# Patient Record
Sex: MALE | Race: BLACK OR AFRICAN AMERICAN | NOT HISPANIC OR LATINO | Employment: UNEMPLOYED | URBAN - METROPOLITAN AREA
[De-identification: names, ages, dates, MRNs, and addresses within clinical notes are randomized per-mention and may not be internally consistent; named-entity substitution may affect disease eponyms.]

---

## 2017-05-17 ENCOUNTER — HOSPITAL ENCOUNTER (EMERGENCY)
Facility: HOSPITAL | Age: 67
Discharge: HOME | End: 2017-05-17
Attending: FAMILY MEDICINE
Payer: MEDICAID

## 2017-05-17 VITALS
DIASTOLIC BLOOD PRESSURE: 88 MMHG | HEART RATE: 71 BPM | HEIGHT: 70 IN | RESPIRATION RATE: 16 BRPM | WEIGHT: 230 LBS | TEMPERATURE: 99 F | SYSTOLIC BLOOD PRESSURE: 169 MMHG | OXYGEN SATURATION: 98 % | BODY MASS INDEX: 32.93 KG/M2

## 2017-05-17 DIAGNOSIS — I10 ESSENTIAL HYPERTENSION: Primary | ICD-10-CM

## 2017-05-17 LAB
ALBUMIN SERPL BCP-MCNC: 4 G/DL
ALP SERPL-CCNC: 106 IU/L
ALT SERPL W/O P-5'-P-CCNC: 43 IU/L
ANION GAP SERPL CALC-SCNC: 9 MMOL/L
AST SERPL-CCNC: 36 IU/L
BASOPHILS # BLD AUTO: 0.01 K/UL
BASOPHILS NFR BLD: 0.2 %
BILIRUB SERPL-MCNC: 0.7 MG/DL
BUN SERPL-MCNC: 15 MG/DL
CALCIUM SERPL-MCNC: 9.4 MG/DL
CHLORIDE SERPL-SCNC: 105 MMOL/L
CO2 SERPL-SCNC: 28 MMOL/L
CREAT SERPL-MCNC: 1.11 MG/DL
DIFFERENTIAL METHOD: NORMAL
EOSINOPHIL # BLD AUTO: 0.1 K/UL
EOSINOPHIL NFR BLD: 1.3 %
ERYTHROCYTE [DISTWIDTH] IN BLOOD BY AUTOMATED COUNT: 12.4 %
EST. GFR  (AFRICAN AMERICAN): >60 ML/MIN/1.73 M^2
EST. GFR  (NON AFRICAN AMERICAN): >60 ML/MIN/1.73 M^2
GLUCOSE SERPL-MCNC: 116 MG/DL
HCT VFR BLD AUTO: 48.7 %
HGB BLD-MCNC: 16.2 G/DL
LYMPHOCYTES # BLD AUTO: 1.2 K/UL
LYMPHOCYTES NFR BLD: 21.9 %
MCH RBC QN AUTO: 30 PG
MCHC RBC AUTO-ENTMCNC: 33.3 %
MCV RBC AUTO: 90 FL
MONOCYTES # BLD AUTO: 0.3 K/UL
MONOCYTES NFR BLD: 5.9 %
NEUTROPHILS # BLD AUTO: 3.7 K/UL
NEUTROPHILS NFR BLD: 70.7 %
PLATELET # BLD AUTO: 251 K/UL
PMV BLD AUTO: 11.1 FL
POTASSIUM SERPL-SCNC: 4 MMOL/L
PROT SERPL-MCNC: 7.7 G/DL
RBC # BLD AUTO: 5.4 M/UL
SODIUM SERPL-SCNC: 142 MMOL/L
TROPONIN I SERPL DL<=0.01 NG/ML-MCNC: <0.012 NG/ML
WBC # BLD AUTO: 5.26 K/UL

## 2017-05-17 PROCEDURE — 80053 COMPREHEN METABOLIC PANEL: CPT

## 2017-05-17 PROCEDURE — 93005 ELECTROCARDIOGRAM TRACING: CPT

## 2017-05-17 PROCEDURE — 63600175 PHARM REV CODE 636 W HCPCS: Performed by: FAMILY MEDICINE

## 2017-05-17 PROCEDURE — 96376 TX/PRO/DX INJ SAME DRUG ADON: CPT

## 2017-05-17 PROCEDURE — 84484 ASSAY OF TROPONIN QUANT: CPT

## 2017-05-17 PROCEDURE — 93010 ELECTROCARDIOGRAM REPORT: CPT | Mod: ,,, | Performed by: INTERNAL MEDICINE

## 2017-05-17 PROCEDURE — 96375 TX/PRO/DX INJ NEW DRUG ADDON: CPT

## 2017-05-17 PROCEDURE — 96374 THER/PROPH/DIAG INJ IV PUSH: CPT

## 2017-05-17 PROCEDURE — 85025 COMPLETE CBC W/AUTO DIFF WBC: CPT

## 2017-05-17 PROCEDURE — 99284 EMERGENCY DEPT VISIT MOD MDM: CPT | Mod: 25

## 2017-05-17 PROCEDURE — 25000003 PHARM REV CODE 250: Performed by: FAMILY MEDICINE

## 2017-05-17 RX ORDER — HYDRALAZINE HYDROCHLORIDE 20 MG/ML
10 INJECTION INTRAMUSCULAR; INTRAVENOUS
Status: COMPLETED | OUTPATIENT
Start: 2017-05-17 | End: 2017-05-17

## 2017-05-17 RX ORDER — AMLODIPINE BESYLATE 10 MG/1
10 TABLET ORAL DAILY
Qty: 30 TABLET | Refills: 0 | Status: SHIPPED | OUTPATIENT
Start: 2017-05-17 | End: 2023-09-27

## 2017-05-17 RX ORDER — LISINOPRIL 40 MG/1
40 TABLET ORAL 2 TIMES DAILY
COMMUNITY
End: 2018-11-07 | Stop reason: ALTCHOICE

## 2017-05-17 RX ORDER — MORPHINE SULFATE 2 MG/ML
4 INJECTION, SOLUTION INTRAMUSCULAR; INTRAVENOUS
Status: COMPLETED | OUTPATIENT
Start: 2017-05-17 | End: 2017-05-17

## 2017-05-17 RX ORDER — METOPROLOL SUCCINATE 100 MG/1
100 TABLET, EXTENDED RELEASE ORAL 2 TIMES DAILY
COMMUNITY
End: 2018-11-07 | Stop reason: ALTCHOICE

## 2017-05-17 RX ORDER — CLONIDINE HYDROCHLORIDE 0.1 MG/1
0.1 TABLET ORAL
Status: COMPLETED | OUTPATIENT
Start: 2017-05-17 | End: 2017-05-17

## 2017-05-17 RX ORDER — CLONIDINE HYDROCHLORIDE 0.1 MG/1
0.1 TABLET ORAL DAILY
Qty: 30 TABLET | Refills: 0 | Status: SHIPPED | OUTPATIENT
Start: 2017-05-17 | End: 2018-11-07

## 2017-05-17 RX ORDER — ONDANSETRON 2 MG/ML
4 INJECTION INTRAMUSCULAR; INTRAVENOUS
Status: COMPLETED | OUTPATIENT
Start: 2017-05-17 | End: 2017-05-17

## 2017-05-17 RX ADMIN — HYDRALAZINE HYDROCHLORIDE 10 MG: 20 INJECTION INTRAMUSCULAR; INTRAVENOUS at 03:05

## 2017-05-17 RX ADMIN — HYDRALAZINE HYDROCHLORIDE 10 MG: 20 INJECTION INTRAMUSCULAR; INTRAVENOUS at 02:05

## 2017-05-17 RX ADMIN — ONDANSETRON 4 MG: 2 INJECTION INTRAMUSCULAR; INTRAVENOUS at 03:05

## 2017-05-17 RX ADMIN — CLONIDINE HYDROCHLORIDE 0.1 MG: 0.1 TABLET ORAL at 03:05

## 2017-05-17 RX ADMIN — MORPHINE SULFATE 4 MG: 2 INJECTION, SOLUTION INTRAMUSCULAR; INTRAVENOUS at 03:05

## 2017-05-17 NOTE — ED PROVIDER NOTES
Encounter Date: 5/17/2017       History     Chief Complaint   Patient presents with    Hypertension     I was at Urgent Care for a HA and they told me my pressure was high, 206/122.      Review of patient's allergies indicates:  No Known Allergies  HPI Comments: Patient complains of intermittent headache since last 4 days.  Patient says his been taking Tylenol which makes it better but this morning pain was more severe.  His pain is in the frontal area of the head.  No nausea, no vomiting, no blurring of vision, no neck stiffness.  Patient went to urgent care where his blood pressure was high and is sent to the ER for evaluation.  On arrival to the ER his blood pressure was still high and patient still continues to have pain.  No focal weakness.  Patient says he was supposed to be taking Caduet and that his insurance did not cover so he is not taking.  He did not go back to his doctor to change his medication.    The history is provided by the patient.     Past Medical History:   Diagnosis Date    Hypertension      Past Surgical History:   Procedure Laterality Date    APPENDECTOMY      PROSTATE SURGERY       History reviewed. No pertinent family history.  Social History   Substance Use Topics    Smoking status: Never Smoker    Smokeless tobacco: None    Alcohol use None     Review of Systems   Constitutional: Negative for activity change, appetite change, fatigue and fever.   HENT: Negative for congestion, ear discharge, ear pain, rhinorrhea and sore throat.    Eyes: Negative for pain, discharge, redness and itching.   Respiratory: Negative for cough, chest tightness, shortness of breath and wheezing.    Cardiovascular: Negative for chest pain and palpitations.   Gastrointestinal: Negative for abdominal distention, abdominal pain, diarrhea, nausea and vomiting.   Genitourinary: Negative for dysuria.   Musculoskeletal: Negative for back pain, gait problem and neck pain.   Skin: Negative for wound.    Neurological: Positive for headaches. Negative for dizziness, tremors, seizures, syncope, facial asymmetry, speech difficulty, weakness, light-headedness and numbness.   Psychiatric/Behavioral: Negative for confusion, decreased concentration and hallucinations. The patient is not nervous/anxious.    All other systems reviewed and are negative.      Physical Exam   Initial Vitals   BP Pulse Resp Temp SpO2   05/17/17 1328 05/17/17 1328 05/17/17 1328 05/17/17 1328 05/17/17 1328   210/107 70 15 99.1 °F (37.3 °C) 98 %     Physical Exam    Nursing note and vitals reviewed.  Constitutional: He appears well-developed and well-nourished.   HENT:   Head: Normocephalic and atraumatic.   Right Ear: External ear normal.   Left Ear: External ear normal.   Nose: Nose normal.   Mouth/Throat: Oropharynx is clear and moist.   Eyes: Conjunctivae and EOM are normal. Pupils are equal, round, and reactive to light.   Neck: Normal range of motion. Neck supple.   Cardiovascular: Normal rate, regular rhythm and normal heart sounds.   Pulmonary/Chest: Breath sounds normal. No respiratory distress. He has no wheezes. He has no rhonchi. He has no rales. He exhibits no tenderness.   Abdominal: Soft. Bowel sounds are normal. He exhibits no distension and no mass. There is no tenderness. There is no rebound and no guarding.   Musculoskeletal: Normal range of motion.   Neurological: He is alert and oriented to person, place, and time. He has normal strength and normal reflexes. He displays normal reflexes. No cranial nerve deficit or sensory deficit. GCS eye subscore is 4. GCS verbal subscore is 5. GCS motor subscore is 6.   Skin: Skin is warm. No rash noted. No erythema.         ED Course   Procedures  Labs Reviewed   CBC W/ AUTO DIFFERENTIAL   COMPREHENSIVE METABOLIC PANEL   APTT   TROPONIN I   PROTIME-INR     EKG Readings: (Independently Interpreted)   Rhythm: Normal Sinus Rhythm. Heart Rate: 75. Ectopy: No Ectopy. Conduction: Normal. ST  Segments: Normal ST Segments. T Waves: Normal. Q Waves: III. Clinical Impression: Normal Sinus Rhythm          Medical Decision Making:   ED Management:  Patient blood pressure and headache improved after medications..  Patient is restarted on his amlodipine and advised to follow-up with the primary care physician for reevaluation of his blood pressure.  Discharge in stable condition after his blood pressure and headache improved.  She is advised to follow-up ER if his blood pressure increases and headache worsens.                   ED Course     Clinical Impression:   The encounter diagnosis was Essential hypertension.    Disposition:   Disposition: Discharged  Condition: Fair  Advised to follow up primary care physician in 2 days.       Anthony Escobedo MD  05/17/17 5227

## 2017-05-17 NOTE — DISCHARGE INSTRUCTIONS
Taking Your Blood Pressure  Blood pressure is the force of blood against the artery wall as it moves from the heart through the blood vessels. You can take your own blood pressure reading using a digital monitor. Take readings as often as your healthcare provider instructs. Take each reading at the same time of day.  Step 1. Relax    · Take your blood pressure at the same time every day, such as in the morning or evening, or at the time your healthcare provider recommends.  · Wait at least a half-hour after smoking, eating, drinking caffeinated beverages, or exercising.  · Sit comfortably at a table with both feet on the floor. Do not cross your legs or feet. Place the monitor near you.  · Rest for a few minutes before you begin.  Step 2. Wrap the cuff    · Place your arm on the table, palm up. Your arm should be at the level of your heart. Wrap the cuff around your upper arm, just above your elbow. Its best done on bare skin, not over clothing. Most cuffs will indicate where the brachial artery (the blood vessel in the middle of the arm at the inner side of the elbow) should line up with the cuff. Look in your monitor's instruction booklet for an illustration. You can also bring your cuff to your healthcare provider and have them show you how to correctly place the cuff.  · Make sure your cuff fits. If it doesnt wrap around your upper arm, order a larger cuff.  Step 3. Inflate the cuff    · Pump the cuff until the scale reads 160. If you have a self-inflating cuff, push the button that starts the pump.  · The cuff will tighten, then loosen.  · The numbers will change. When they stop changing, your blood pressure reading will appear.  · Take 2 or 3 readings one minute apart.  Step 4. Write down the results of each reading    · Write down your blood pressure numbers for each reading. Note the date and time. Keep your results in one place, such as a notebook. Even if your monitor has a built-in memory, keep a hard  copy of the readings.  · Remove the cuff from your arm. Turn off the machine.  · Share your blood pressure records with your healthcare providers at each visit.  Date Last Reviewed: 4/27/2016  © 6886-5045 [employer]. [address] [place] [address], [place] [address]. All rights reserved. This information is not intended as a substitute for professional medical care. Always follow your healthcare professional's instructions.

## 2018-04-10 ENCOUNTER — OFFICE VISIT (OUTPATIENT)
Dept: GASTROENTEROLOGY | Facility: CLINIC | Age: 68
End: 2018-04-10
Payer: MEDICARE

## 2018-04-10 VITALS
BODY MASS INDEX: 34.79 KG/M2 | HEIGHT: 70 IN | WEIGHT: 243 LBS | HEART RATE: 67 BPM | SYSTOLIC BLOOD PRESSURE: 181 MMHG | DIASTOLIC BLOOD PRESSURE: 90 MMHG

## 2018-04-10 DIAGNOSIS — Z80.0 FAMILY HISTORY OF COLON CANCER: Primary | ICD-10-CM

## 2018-04-10 DIAGNOSIS — Z86.010 H/O ADENOMATOUS POLYP OF COLON: ICD-10-CM

## 2018-04-10 PROCEDURE — 99203 OFFICE O/P NEW LOW 30 MIN: CPT | Mod: S$GLB,,, | Performed by: INTERNAL MEDICINE

## 2018-04-10 RX ORDER — ATORVASTATIN CALCIUM 40 MG/1
40 TABLET, FILM COATED ORAL DAILY
Status: ON HOLD | COMMUNITY
End: 2023-09-15 | Stop reason: HOSPADM

## 2018-04-10 NOTE — PATIENT INSTRUCTIONS
Colonoscopy     A camera attached to a flexible tube with a viewing lens is used to take video pictures.     Colonoscopy is a test to view the inside of your lower digestive tract (colon and rectum). Sometimes it can show the last part of the small intestine (ileum). During the test, small pieces of tissue may be removed for testing. This is called a biopsy. Small growths, such as polyps, may also be removed.   Why is colonoscopy done?  The test is done to help look for colon cancer. And it can help find the source of abdominal pain, bleeding, and changes in bowel habits. It may be needed once a year, depending on factors such as your:  · Age  · Health history  · Family health history  · Symptoms  · Results from any prior colonoscopy  Risks and possible complications  These include:  · Bleeding               · A puncture or tear in the colon   · Risks of anesthesia  · A cancer lesion not being seen  Getting ready   To prepare for the test:  · Talk with your healthcare provider about the risks of the test (see below). Also ask your healthcare provider about alternatives to the test.  · Tell your healthcare provider about any medicines you take. Also tell him or her about any health conditions you may have.  · Make sure your rectum and colon are empty for the test. Follow the diet and bowel prep instructions exactly. If you dont, the test may need to be rescheduled.  · Plan for a friend or family member to drive you home after the test.     Colonoscopy provides an inside view of the entire colon.     You may discuss the results with your doctor right away or at a future visit.  During the test   The test is usually done in the hospital on an outpatient basis. This means you go home the same day. The procedure takes about 30 minutes. During that time:  · You are given relaxing (sedating) medicine through an IV line. You may be drowsy, or fully asleep.  · The healthcare provider will first give you a physical exam to  check for anal and rectal problems.  · Then the anus is lubricated and the scope inserted.  · If you are awake, you may have a feeling similar to needing to have a bowel movement. You may also feel pressure as air is pumped into the colon. Its OK to pass gas during the procedure.  · Biopsy, polyp removal, or other treatments may be done during the test.  After the test   You may have gas right after the test. It can help to try to pass it to help prevent later bloating. Your healthcare provider may discuss the results with you right away. Or you may need to schedule a follow-up visit to talk about the results. After the test, you can go back to your normal eating and other activities. You may be tired from the sedation and need to rest for a few hours.  Date Last Reviewed: 11/1/2016 © 2000-2017 [employer]. [address] [place] [address], [place] [address]. All rights reserved. This information is not intended as a substitute for professional medical care. Always follow your healthcare professional's instructions.

## 2018-04-10 NOTE — PROGRESS NOTES
History and Physical      Chief complaints: Requesting screening colonscopy    History of Presenting Illness     Patient is a family history of colon cancer in his sister.  Colonoscopy May 31, 2013 documented a small adenoma which was removed.  Patient is due for follow-up colonoscopy.   Patient denies any abdominal pain, weight loss or blood in the stool.    GI systems review is negative.  Past medical history includes hypertension.  Assisting systems review is generally negative.  Patient does have some joint pain.  Prior history of kidney stones and hypertension.  Nonsmoker.  Nondrinker.  Prior appendectomy       Past Medical History:   Diagnosis Date    Hypertension        Past Surgical History:   Procedure Laterality Date    APPENDECTOMY      PROSTATE SURGERY         Family History   Problem Relation Age of Onset    No Known Problems Mother     No Known Problems Father     No Known Problems Sister        Social History     Social History    Marital status:      Spouse name: N/A    Number of children: N/A    Years of education: N/A     Social History Main Topics    Smoking status: Never Smoker    Smokeless tobacco: None    Alcohol use None    Drug use: No    Sexual activity: Not Asked     Other Topics Concern    None     Social History Narrative    None       Current Outpatient Prescriptions   Medication Sig Dispense Refill    amlodipine (NORVASC) 10 MG tablet Take 1 tablet (10 mg total) by mouth once daily. 30 tablet 0    atorvastatin (LIPITOR) 40 MG tablet Take 40 mg by mouth once daily.      cloNIDine (CATAPRES) 0.1 MG tablet Take 1 tablet (0.1 mg total) by mouth once daily. 30 tablet 0    lisinopril (PRINIVIL,ZESTRIL) 40 MG tablet Take 40 mg by mouth 2 (two) times daily.      metoprolol succinate (TOPROL-XL) 100 MG 24 hr tablet Take 100 mg by mouth once daily.       No current facility-administered medications for this visit.        Review of patient's allergies indicates:  No  Known Allergies    Objective:      Vitals:    04/10/18 1005   BP: (!) 181/90   Pulse: 67     Physical Exam   Constitutional: Patient is oriented to person, place, and time. Appears well-nourished.   HENT: sclera non icteric  Mouth/Throat: Oropharynx is clear and moist.   Eyes: Pupils are equal, round, and reactive to light.   Neck: Neck supple.   Cardiovascular: Normal heart sounds.   Pulmonary/Chest: Effort normal and breath sounds normal.   Abdominal: Soft. Exhibits no mass. There is no tenderness. There is no guarding.    Neurological:Alert and oriented to person, place, and time.   Skin: Skin is warm. No rash noted.   Psychiatric: Has a normal mood and affect.     Assessment:  Colon cancer surveillance  Family history colon cancer  Personal history of small adenoma    Plan:  Colonoscopy       I have reviewed the patient's medical history in detail and updated the computerized patient record

## 2018-04-10 NOTE — LETTER
April 10, 2018      Karen Aragon MD  [address]  [address]  [facility]  [place] [address]           [facility]  [address], [address]   [place] [address]  Phone: [phone]  Fax: [phone]          Patient: Luis Mendez   MR Number: [id number]   YOB: 1950   Date of Visit: 4/10/2018       Dear Dr. Karen Aragon:    Thank you for referring Luis Mendez to me for evaluation. Attached you will find relevant portions of my assessment and plan of care.    If you have questions, please do not hesitate to call me. I look forward to following Luis Mendez along with you.    Sincerely,    Edison Amado Jr., MD    Enclosure  CC:  No Recipients    If you would like to receive this communication electronically, please contact [email or web address] or [phone] to request more information on [employer] [facility] access.    For providers and/or their staff who would like to refer a patient to [facility], please contact us through our one-stop-shop provider referral line, [facility], at [phone].    If you feel you have received this communication in error or would no longer like to receive these types of communications, please e-mail [email or web address]

## 2018-04-11 ENCOUNTER — TELEPHONE (OUTPATIENT)
Dept: GASTROENTEROLOGY | Facility: CLINIC | Age: 68
End: 2018-04-11

## 2018-04-11 DIAGNOSIS — Z86.010 HISTORY OF COLONIC POLYPS: Primary | ICD-10-CM

## 2018-04-11 NOTE — TELEPHONE ENCOUNTER
Patient is scheduled for Colonoscopy at [facility] on 5/15/18, prep instructions was explained and mailed out. Patient request for Miralax & Dulcolax instructions.

## 2018-05-15 PROBLEM — Z12.12 SCREENING FOR COLORECTAL CANCER: Status: ACTIVE | Noted: 2018-05-15

## 2018-05-15 PROBLEM — Z12.11 SCREENING FOR COLORECTAL CANCER: Status: ACTIVE | Noted: 2018-05-15

## 2018-05-29 ENCOUNTER — TELEPHONE (OUTPATIENT)
Dept: GASTROENTEROLOGY | Facility: CLINIC | Age: 68
End: 2018-05-29

## 2018-05-29 NOTE — TELEPHONE ENCOUNTER
----- Message from Monika Cutler sent at 5/29/2018  1:37 PM CDT -----  Contact: Self. [phone] ([place] office)  Patient is returning your call.  Please advise.

## 2018-05-29 NOTE — TELEPHONE ENCOUNTER
----- Message from Edison Amado Jr., MD sent at 5/28/2018  4:42 PM CDT -----  Polyp is an adenoma.  Follow-up colonoscopy in 4 years.

## 2018-05-29 NOTE — TELEPHONE ENCOUNTER
A message was left on patient's voice mail to give the office a call back in regards to test results. A recall was made for follow-up in 4 years.

## 2018-08-04 ENCOUNTER — HOSPITAL ENCOUNTER (EMERGENCY)
Facility: HOSPITAL | Age: 68
Discharge: HOME | End: 2018-08-04
Attending: SURGERY
Payer: MEDICARE

## 2018-08-04 VITALS
OXYGEN SATURATION: 99 % | RESPIRATION RATE: 16 BRPM | TEMPERATURE: 99 F | BODY MASS INDEX: 34.44 KG/M2 | SYSTOLIC BLOOD PRESSURE: 141 MMHG | DIASTOLIC BLOOD PRESSURE: 77 MMHG | HEART RATE: 81 BPM | WEIGHT: 240 LBS

## 2018-08-04 DIAGNOSIS — R42 VERTIGO: Primary | ICD-10-CM

## 2018-08-04 DIAGNOSIS — R06.02 SOB (SHORTNESS OF BREATH): ICD-10-CM

## 2018-08-04 DIAGNOSIS — I10 HYPERTENSION: ICD-10-CM

## 2018-08-04 LAB
ALBUMIN SERPL BCP-MCNC: 4.3 G/DL
ALP SERPL-CCNC: 93 U/L
ALT SERPL W/O P-5'-P-CCNC: 37 U/L
ANION GAP SERPL CALC-SCNC: 6 MMOL/L
AST SERPL-CCNC: 28 U/L
BASOPHILS # BLD AUTO: 0.01 K/UL
BASOPHILS NFR BLD: 0.2 %
BILIRUB SERPL-MCNC: 0.7 MG/DL
BUN SERPL-MCNC: 17 MG/DL
CALCIUM SERPL-MCNC: 9.2 MG/DL
CHLORIDE SERPL-SCNC: 104 MMOL/L
CO2 SERPL-SCNC: 31 MMOL/L
CREAT SERPL-MCNC: 1.1 MG/DL
DIFFERENTIAL METHOD: NORMAL
EOSINOPHIL # BLD AUTO: 0.1 K/UL
EOSINOPHIL NFR BLD: 2.4 %
ERYTHROCYTE [DISTWIDTH] IN BLOOD BY AUTOMATED COUNT: 12 %
EST. GFR  (AFRICAN AMERICAN): >60 ML/MIN/1.73 M^2
EST. GFR  (NON AFRICAN AMERICAN): >60 ML/MIN/1.73 M^2
GLUCOSE SERPL-MCNC: 109 MG/DL
HCT VFR BLD AUTO: 46.6 %
HGB BLD-MCNC: 15.5 G/DL
INR PPP: 1.1
LYMPHOCYTES # BLD AUTO: 1.5 K/UL
LYMPHOCYTES NFR BLD: 29.6 %
MCH RBC QN AUTO: 29.8 PG
MCHC RBC AUTO-ENTMCNC: 33.3 G/DL
MCV RBC AUTO: 89 FL
MONOCYTES # BLD AUTO: 0.5 K/UL
MONOCYTES NFR BLD: 10.5 %
NEUTROPHILS # BLD AUTO: 2.8 K/UL
NEUTROPHILS NFR BLD: 57.1 %
NT-PROBNP: 42 PG/ML
PLATELET # BLD AUTO: 258 K/UL
PMV BLD AUTO: 10.6 FL
POTASSIUM SERPL-SCNC: 4.1 MMOL/L
PROT SERPL-MCNC: 8.4 G/DL
PROTHROMBIN TIME: 11.6 SEC
RBC # BLD AUTO: 5.21 M/UL
SODIUM SERPL-SCNC: 141 MMOL/L
TROPONIN I SERPL DL<=0.01 NG/ML-MCNC: <0.012 NG/ML
WBC # BLD AUTO: 4.96 K/UL

## 2018-08-04 PROCEDURE — 93010 ELECTROCARDIOGRAM REPORT: CPT | Mod: ,,, | Performed by: INTERNAL MEDICINE

## 2018-08-04 PROCEDURE — 85610 PROTHROMBIN TIME: CPT

## 2018-08-04 PROCEDURE — 93005 ELECTROCARDIOGRAM TRACING: CPT

## 2018-08-04 PROCEDURE — 99284 EMERGENCY DEPT VISIT MOD MDM: CPT | Mod: 25

## 2018-08-04 PROCEDURE — 63600175 PHARM REV CODE 636 W HCPCS: Performed by: SURGERY

## 2018-08-04 PROCEDURE — 84484 ASSAY OF TROPONIN QUANT: CPT

## 2018-08-04 PROCEDURE — 96361 HYDRATE IV INFUSION ADD-ON: CPT

## 2018-08-04 PROCEDURE — 25000003 PHARM REV CODE 250: Performed by: SURGERY

## 2018-08-04 PROCEDURE — 96374 THER/PROPH/DIAG INJ IV PUSH: CPT

## 2018-08-04 PROCEDURE — 83880 ASSAY OF NATRIURETIC PEPTIDE: CPT

## 2018-08-04 PROCEDURE — 80053 COMPREHEN METABOLIC PANEL: CPT

## 2018-08-04 PROCEDURE — 85025 COMPLETE CBC W/AUTO DIFF WBC: CPT

## 2018-08-04 PROCEDURE — 96375 TX/PRO/DX INJ NEW DRUG ADDON: CPT

## 2018-08-04 RX ORDER — MECLIZINE HYDROCHLORIDE 25 MG/1
25 TABLET ORAL 3 TIMES DAILY PRN
Qty: 20 TABLET | Refills: 0 | Status: SHIPPED | OUTPATIENT
Start: 2018-08-04 | End: 2018-11-07

## 2018-08-04 RX ORDER — MECLIZINE HCL 12.5 MG 12.5 MG/1
25 TABLET ORAL
Status: COMPLETED | OUTPATIENT
Start: 2018-08-04 | End: 2018-08-04

## 2018-08-04 RX ORDER — SODIUM CHLORIDE 9 MG/ML
1000 INJECTION, SOLUTION INTRAVENOUS
Status: COMPLETED | OUTPATIENT
Start: 2018-08-04 | End: 2018-08-04

## 2018-08-04 RX ORDER — ASPIRIN 325 MG
325 TABLET ORAL DAILY
COMMUNITY
End: 2018-11-07 | Stop reason: DRUGHIGH

## 2018-08-04 RX ORDER — HYDRALAZINE HYDROCHLORIDE 20 MG/ML
20 INJECTION INTRAMUSCULAR; INTRAVENOUS
Status: COMPLETED | OUTPATIENT
Start: 2018-08-04 | End: 2018-08-04

## 2018-08-04 RX ADMIN — LORAZEPAM 0.5 MG: 2 INJECTION INTRAMUSCULAR; INTRAVENOUS at 11:08

## 2018-08-04 RX ADMIN — MECLIZINE 25 MG: 12.5 TABLET ORAL at 10:08

## 2018-08-04 RX ADMIN — SODIUM CHLORIDE 1000 ML: 0.9 INJECTION, SOLUTION INTRAVENOUS at 11:08

## 2018-08-04 RX ADMIN — HYDRALAZINE HYDROCHLORIDE 20 MG: 20 INJECTION INTRAMUSCULAR; INTRAVENOUS at 11:08

## 2018-08-04 NOTE — ED PROVIDER NOTES
Encounter Date: 8/4/2018       History     Chief Complaint   Patient presents with    Dizziness     states got dizzy that started today with mild SOB     Patient stated he got dizzy when he got up this morning and change positions and got low short winded.  He does not complain of any chest pain headache  or abdominal pain. His blood pressure on admit was 220/110      The history is provided by the patient.   Dizziness   This is a new problem. The current episode started 3 to 5 hours ago. The problem occurs hourly. The problem has been gradually improving. Associated symptoms include shortness of breath. Pertinent negatives include no chest pain, no abdominal pain and no headaches. The symptoms are aggravated by standing and exertion. Nothing relieves the symptoms. He has tried nothing for the symptoms. The treatment provided mild relief.     Review of patient's allergies indicates:  No Known Allergies  Past Medical History:   Diagnosis Date    Hypertension      Past Surgical History:   Procedure Laterality Date    APPENDECTOMY      COLONOSCOPY N/A 5/15/2018    Procedure: COLONOSCOPY;  Surgeon: Edison Amado Jr., MD;  [facility];  Service: Endoscopy;  Laterality: N/A;    PROSTATE SURGERY       Family History   Problem Relation Age of Onset    No Known Problems Mother     No Known Problems Father     No Known Problems Sister      Social History   Substance Use Topics    Smoking status: Former Smoker    Smokeless tobacco: Never Used    Alcohol use No     Review of Systems   Constitutional: Negative.    HENT: Negative.    Eyes: Negative.    Respiratory: Positive for shortness of breath.    Cardiovascular: Negative for chest pain.   Gastrointestinal: Negative for abdominal pain.   Endocrine: Negative.    Genitourinary: Negative.    Musculoskeletal: Negative.    Skin: Negative.    Allergic/Immunologic: Negative.    Neurological: Positive for dizziness. Negative for headaches.   Hematological:  Negative.    Psychiatric/Behavioral: Negative.        Physical Exam     Initial Vitals [08/04/18 1033]   BP Pulse Resp Temp SpO2   (!) 222/104 73 20 98.7 °F (37.1 °C) 100 %      MAP       --         Physical Exam    Nursing note and vitals reviewed.  Constitutional: He appears well-developed and well-nourished.   HENT:   Head: Normocephalic.   Eyes: Conjunctivae are normal.   Neck: Normal range of motion.   Cardiovascular: Normal rate, regular rhythm, normal heart sounds and intact distal pulses.   Abdominal: Soft.   Musculoskeletal: Normal range of motion.   Neurological: He is alert and oriented to person, place, and time. He has normal strength. No cranial nerve deficit or sensory deficit.   Skin: Skin is warm and dry.   Psychiatric: He has a normal mood and affect.         ED Course   Procedures  Labs Reviewed   COMPREHENSIVE METABOLIC PANEL - Abnormal; Notable for the following:        Result Value    CO2 31 (*)     Anion Gap 6 (*)     All other components within normal limits   CBC W/ AUTO DIFFERENTIAL   PROTIME-INR   TROPONIN I   NT-PRO NATRIURETIC PEPTIDE   URINALYSIS     EKG Readings: (Independently Interpreted)   Rhythm: Normal Sinus Rhythm. Ectopy: No Ectopy. Conduction: Normal. ST Segments: Normal ST Segments. T Waves: Normal. Clinical Impression: Normal Sinus Rhythm       Imaging Results          X-Ray Chest 1 View (Final result)  Result time 08/04/18 11:55:24    Final result by Ishaan Walton MD (08/04/18 11:55:24)                 Impression:      No acute abnormality.      Electronically signed by: Horacio Walton MD  Date:    08/04/2018  Time:    11:55             Narrative:    EXAMINATION:  XR CHEST 1 VIEW    CLINICAL HISTORY:  . Shortness of breath    TECHNIQUE:  Single frontal portable view of the chest was performed.    COMPARISON:  None    FINDINGS:  Support devices: None    The lungs are clear, with normal appearance of pulmonary vasculature and no pleural effusion or pneumothorax.    The  cardiac silhouette is normal in size. The hilar and mediastinal contours are unremarkable.    Bones are intact.                                 Medical Decision Making:   Initial Assessment:   Hypertension and dizziness  ED Management:  Patient had normal neurologic exam.  His blood pressure was lowered in the ED.  His EKG troponins x-rays were all normal He was asymptomatic at time of discharge                      Clinical Impression:   The primary encounter diagnosis was Vertigo. Diagnoses of Hypertension and SOB (shortness of breath) were also pertinent to this visit.      Disposition:   Disposition: Discharged  Condition: Stable                        CDawson Valdez III, MD  08/04/18 8959

## 2018-08-04 NOTE — ED NOTES
Warm blanket provided to pt for comfort measures, informed pt again that the wait is on the urine specimen

## 2018-08-06 ENCOUNTER — LAB VISIT (OUTPATIENT)
Dept: LAB | Facility: HOSPITAL | Age: 68
End: 2018-08-06
Attending: INTERNAL MEDICINE
Payer: MEDICARE

## 2018-08-06 DIAGNOSIS — R07.2 PRECORDIAL PAIN: Primary | ICD-10-CM

## 2018-08-06 DIAGNOSIS — E07.9 DISORDER OF THYROID, UNSPECIFIED: ICD-10-CM

## 2018-08-06 DIAGNOSIS — R07.9 CHEST PAIN, UNSPECIFIED: Primary | ICD-10-CM

## 2018-08-06 LAB
ALBUMIN SERPL BCP-MCNC: 4.4 G/DL
ALP SERPL-CCNC: 97 U/L
ALT SERPL W/O P-5'-P-CCNC: 38 U/L
ANION GAP SERPL CALC-SCNC: 7 MMOL/L
AST SERPL-CCNC: 28 U/L
BASOPHILS # BLD AUTO: 0.02 K/UL
BASOPHILS NFR BLD: 0.4 %
BILIRUB SERPL-MCNC: 0.8 MG/DL
BUN SERPL-MCNC: 20 MG/DL
CALCIUM SERPL-MCNC: 9.4 MG/DL
CHLORIDE SERPL-SCNC: 105 MMOL/L
CHOLEST SERPL-MCNC: 146 MG/DL
CHOLEST/HDLC SERPL: 3.2 {RATIO}
CO2 SERPL-SCNC: 30 MMOL/L
CREAT SERPL-MCNC: 1.46 MG/DL
DIFFERENTIAL METHOD: NORMAL
EOSINOPHIL # BLD AUTO: 0.1 K/UL
EOSINOPHIL NFR BLD: 1.7 %
ERYTHROCYTE [DISTWIDTH] IN BLOOD BY AUTOMATED COUNT: 12.7 %
EST. GFR  (AFRICAN AMERICAN): 57.5 ML/MIN/1.73 M^2
EST. GFR  (NON AFRICAN AMERICAN): 49.8 ML/MIN/1.73 M^2
GLUCOSE SERPL-MCNC: 116 MG/DL
HCT VFR BLD AUTO: 46.4 %
HDLC SERPL-MCNC: 46 MG/DL
HDLC SERPL: 31.5 %
HGB BLD-MCNC: 15.5 G/DL
LDLC SERPL CALC-MCNC: 87.4 MG/DL
LYMPHOCYTES # BLD AUTO: 1.7 K/UL
LYMPHOCYTES NFR BLD: 34.9 %
MCH RBC QN AUTO: 30.3 PG
MCHC RBC AUTO-ENTMCNC: 33.4 G/DL
MCV RBC AUTO: 91 FL
MONOCYTES # BLD AUTO: 0.5 K/UL
MONOCYTES NFR BLD: 10.3 %
NEUTROPHILS # BLD AUTO: 2.5 K/UL
NEUTROPHILS NFR BLD: 52.7 %
NONHDLC SERPL-MCNC: 100 MG/DL
PLATELET # BLD AUTO: 269 K/UL
PMV BLD AUTO: 10.6 FL
POTASSIUM SERPL-SCNC: 4.1 MMOL/L
PROT SERPL-MCNC: 8.3 G/DL
RBC # BLD AUTO: 5.12 M/UL
SODIUM SERPL-SCNC: 142 MMOL/L
TRIGL SERPL-MCNC: 63 MG/DL
TSH SERPL DL<=0.005 MIU/L-ACNC: 1.56 UIU/ML
WBC # BLD AUTO: 4.75 K/UL

## 2018-08-06 PROCEDURE — 85025 COMPLETE CBC W/AUTO DIFF WBC: CPT | Mod: PO

## 2018-08-06 PROCEDURE — 84443 ASSAY THYROID STIM HORMONE: CPT | Mod: PO

## 2018-08-06 PROCEDURE — 86803 HEPATITIS C AB TEST: CPT | Mod: PO

## 2018-08-06 PROCEDURE — 80061 LIPID PANEL: CPT

## 2018-08-06 PROCEDURE — 36415 COLL VENOUS BLD VENIPUNCTURE: CPT | Mod: PO

## 2018-08-06 PROCEDURE — 80053 COMPREHEN METABOLIC PANEL: CPT | Mod: PO

## 2018-08-07 LAB — HCV AB SERPL QL IA: NEGATIVE

## 2018-08-09 DIAGNOSIS — R07.9 CHEST PAIN: ICD-10-CM

## 2018-08-09 DIAGNOSIS — I10 HTN (HYPERTENSION): Primary | ICD-10-CM

## 2018-08-09 DIAGNOSIS — I10 HYPERTENSION: Primary | ICD-10-CM

## 2018-08-10 ENCOUNTER — HOSPITAL ENCOUNTER (OUTPATIENT)
Dept: CARDIOLOGY | Facility: HOSPITAL | Age: 68
Discharge: HOME | End: 2018-08-10
Attending: INTERNAL MEDICINE
Payer: MEDICARE

## 2018-08-10 ENCOUNTER — HOSPITAL ENCOUNTER (OUTPATIENT)
Dept: RADIOLOGY | Facility: HOSPITAL | Age: 68
Discharge: HOME | End: 2018-08-10
Attending: INTERNAL MEDICINE
Payer: MEDICARE

## 2018-08-10 DIAGNOSIS — I10 HYPERTENSION: ICD-10-CM

## 2018-08-10 DIAGNOSIS — R07.9 CHEST PAIN: ICD-10-CM

## 2018-08-10 DIAGNOSIS — I10 HTN (HYPERTENSION): ICD-10-CM

## 2018-08-10 DIAGNOSIS — I10 HYPERTENSION: Primary | ICD-10-CM

## 2018-08-10 DIAGNOSIS — R07.9 CHEST PAIN, UNSPECIFIED: ICD-10-CM

## 2018-08-10 DIAGNOSIS — I10 HTN (HYPERTENSION): Primary | ICD-10-CM

## 2018-08-10 LAB
DIASTOLIC DYSFUNCTION: NO
DIASTOLIC DYSFUNCTION: NO
MITRAL VALVE MOBILITY: NORMAL
RETIRED EF AND QEF - SEE NOTES: 55 (ref 55–65)
TRICUSPID VALVE REGURGITATION: NORMAL

## 2018-08-10 PROCEDURE — 93016 CV STRESS TEST SUPVJ ONLY: CPT | Mod: ,,, | Performed by: INTERNAL MEDICINE

## 2018-08-10 PROCEDURE — 93306 TTE W/DOPPLER COMPLETE: CPT | Mod: 26,,, | Performed by: INTERNAL MEDICINE

## 2018-08-10 PROCEDURE — 93306 TTE W/DOPPLER COMPLETE: CPT | Mod: PO

## 2018-08-10 PROCEDURE — 93018 CV STRESS TEST I&R ONLY: CPT | Mod: ,,, | Performed by: INTERNAL MEDICINE

## 2018-08-10 PROCEDURE — 78452 HT MUSCLE IMAGE SPECT MULT: CPT | Mod: TC,PO

## 2018-08-10 RX ORDER — REGADENOSON 0.08 MG/ML
INJECTION, SOLUTION INTRAVENOUS
Status: DISPENSED
Start: 2018-08-10 | End: 2018-08-10

## 2018-08-12 ENCOUNTER — HOSPITAL ENCOUNTER (EMERGENCY)
Facility: HOSPITAL | Age: 68
Discharge: HOME | End: 2018-08-12
Attending: EMERGENCY MEDICINE
Payer: MEDICARE

## 2018-08-12 VITALS
BODY MASS INDEX: 33.14 KG/M2 | WEIGHT: 231.5 LBS | RESPIRATION RATE: 18 BRPM | TEMPERATURE: 98 F | OXYGEN SATURATION: 98 % | HEIGHT: 70 IN | SYSTOLIC BLOOD PRESSURE: 187 MMHG | DIASTOLIC BLOOD PRESSURE: 84 MMHG | HEART RATE: 79 BPM

## 2018-08-12 DIAGNOSIS — R07.9 CHEST PAIN: ICD-10-CM

## 2018-08-12 DIAGNOSIS — I10 ESSENTIAL HYPERTENSION: Primary | ICD-10-CM

## 2018-08-12 LAB
ALBUMIN SERPL BCP-MCNC: 3.9 G/DL
ALP SERPL-CCNC: 95 U/L
ALT SERPL W/O P-5'-P-CCNC: 32 U/L
ANION GAP SERPL CALC-SCNC: 10 MMOL/L
AST SERPL-CCNC: 20 U/L
BASOPHILS # BLD AUTO: 0.02 K/UL
BASOPHILS NFR BLD: 0.5 %
BILIRUB SERPL-MCNC: 0.9 MG/DL
BNP SERPL-MCNC: <10 PG/ML
BUN SERPL-MCNC: 14 MG/DL
CALCIUM SERPL-MCNC: 9.6 MG/DL
CHLORIDE SERPL-SCNC: 105 MMOL/L
CO2 SERPL-SCNC: 25 MMOL/L
CREAT SERPL-MCNC: 1.3 MG/DL
DIFFERENTIAL METHOD: ABNORMAL
EOSINOPHIL # BLD AUTO: 0.1 K/UL
EOSINOPHIL NFR BLD: 3.5 %
ERYTHROCYTE [DISTWIDTH] IN BLOOD BY AUTOMATED COUNT: 12.3 %
EST. GFR  (AFRICAN AMERICAN): >60 ML/MIN/1.73 M^2
EST. GFR  (NON AFRICAN AMERICAN): 57 ML/MIN/1.73 M^2
GLUCOSE SERPL-MCNC: 109 MG/DL
HCT VFR BLD AUTO: 44.9 %
HGB BLD-MCNC: 15.8 G/DL
LYMPHOCYTES # BLD AUTO: 1.3 K/UL
LYMPHOCYTES NFR BLD: 32.3 %
MCH RBC QN AUTO: 31.3 PG
MCHC RBC AUTO-ENTMCNC: 35.2 G/DL
MCV RBC AUTO: 89 FL
MONOCYTES # BLD AUTO: 0.3 K/UL
MONOCYTES NFR BLD: 7.5 %
NEUTROPHILS # BLD AUTO: 2.3 K/UL
NEUTROPHILS NFR BLD: 56.2 %
PLATELET # BLD AUTO: 264 K/UL
PMV BLD AUTO: 10 FL
POTASSIUM SERPL-SCNC: 3.8 MMOL/L
PROT SERPL-MCNC: 7.8 G/DL
RBC # BLD AUTO: 5.04 M/UL
SODIUM SERPL-SCNC: 140 MMOL/L
TROPONIN I SERPL DL<=0.01 NG/ML-MCNC: <0.006 NG/ML
WBC # BLD AUTO: 4.02 K/UL

## 2018-08-12 PROCEDURE — 99284 EMERGENCY DEPT VISIT MOD MDM: CPT | Mod: 25

## 2018-08-12 PROCEDURE — 83880 ASSAY OF NATRIURETIC PEPTIDE: CPT

## 2018-08-12 PROCEDURE — 93010 ELECTROCARDIOGRAM REPORT: CPT | Mod: ,,, | Performed by: INTERNAL MEDICINE

## 2018-08-12 PROCEDURE — 85025 COMPLETE CBC W/AUTO DIFF WBC: CPT

## 2018-08-12 PROCEDURE — 80053 COMPREHEN METABOLIC PANEL: CPT

## 2018-08-12 PROCEDURE — 93005 ELECTROCARDIOGRAM TRACING: CPT

## 2018-08-12 PROCEDURE — 84484 ASSAY OF TROPONIN QUANT: CPT

## 2018-08-12 RX ORDER — ASPIRIN 325 MG
325 TABLET ORAL
Status: DISCONTINUED | OUTPATIENT
Start: 2018-08-12 | End: 2018-08-12

## 2018-08-12 NOTE — ED PROVIDER NOTES
SCRIBE #1 NOTE: IDelores, am scribing for, and in the presence of, Randolph Khan MD. I have scribed the entire note.      History      Chief Complaint   Patient presents with    Chest Pain     pt c/o chest pain on the Lt side for about a week        Review of patient's allergies indicates:  No Known Allergies     HPI   HPI    8/12/2018, 9:32 AM   History obtained from the patient      History of Present Illness: Luis Mendez is a 67 y.o. male patient who presents to the Emergency Department for non-radiating L sided CP x1 week. Sxs are sudden in onset, intermittent, and moderate in severity. Pt reports pain onset again this morning at 4 AM as he was walking from the restroom. The pain lasted for about 10-15 seconds and would resolve then return. Denies any pain currently, resolved after taking HTN medication. He rates the pain 3/10 in severity and describes it as pressure. No modifying factors. Associated sx include SOB. Patient denies recent travel, long car rides, fever, chills, diaphoresis, cough, leg pain/swelling, abd pain, N/V, extremity weakness/numbness, dizziness, HA, and all other sxs at this time. He took ASA 81 mg this AM which he takes daily. Pt had stress test and ECHO completed on 8/10. Denies hx of smoking. No further complaints or concerns at this time.     Arrival mode: Personal vehicle    PCP: Karen Aragon MD       Past Medical History:  Past Medical History:   Diagnosis Date    Hypertension        Past Surgical History:  Past Surgical History:   Procedure Laterality Date    APPENDECTOMY      PROSTATE SURGERY           Family History:  Family History   Problem Relation Age of Onset    No Known Problems Mother     No Known Problems Father     No Known Problems Sister        Social History:  Social History     Tobacco Use    Smoking status: Former Smoker    Smokeless tobacco: Never Used   Substance and Sexual Activity    Alcohol use: No    Drug use: No    Sexual activity:  Unknown       ROS   Review of Systems   Constitutional: Negative for chills, diaphoresis and fever.        (-) recent long travel, (-) recent car rides   HENT: Negative for sore throat.    Respiratory: Positive for shortness of breath. Negative for cough.    Cardiovascular: Positive for chest pain. Negative for palpitations and leg swelling.   Gastrointestinal: Negative for abdominal pain, nausea and vomiting.   Genitourinary: Negative for dysuria.   Musculoskeletal: Negative for back pain.   Skin: Negative for rash.   Neurological: Negative for dizziness, seizures, syncope, weakness, light-headedness and headaches.   Hematological: Does not bruise/bleed easily.   All other systems reviewed and are negative.      Physical Exam      Initial Vitals [08/12/18 0931]   BP Pulse Resp Temp SpO2   (!) 184/101 79 18 98.3 °F (36.8 °C) 98 %      MAP       --          Physical Exam  Nursing Notes and Vital Signs Reviewed.  Constitutional: Patient is in no acute distress. Awake and alert. Well-developed and well-nourished.  Head: Atraumatic. Normocephalic.  Eyes: PERRL. EOM intact. Conjunctivae are not pale. No scleral icterus.  ENT: Mucous membranes are moist. Oropharynx is clear and symmetric.    Neck: Supple. Full ROM. No lymphadenopathy.  Cardiovascular: Regular rate. Regular rhythm. No murmurs, rubs, or gallops. Distal pulses are 2+ and symmetric.  Pulmonary/Chest: No respiratory distress. Clear to auscultation bilaterally. No wheezing, rales, or rhonchi.  Abdominal: Soft and non-distended.  There is no tenderness.  No rebound, guarding, or rigidity.  Good bowel sounds.    Musculoskeletal: Moves all extremities. No obvious deformities. No edema. No calf tenderness.  Skin: Warm and dry.  Neurological:  Alert, awake, and appropriate.  Normal speech.  No acute focal neurological deficits are appreciated.  Psychiatric: Normal affect. Good eye contact. Appropriate in content.    ED Course    Procedures  ED Vital Signs:  Vitals:  "   08/12/18 0931 08/12/18 1114   BP: (!) 184/101 (!) 187/84   Pulse: 79    Resp: 18    Temp: 98.3 °F (36.8 °C)    TempSrc: Oral    SpO2: 98%    Weight: 105 kg (231 lb 7.7 oz)    Height: 5' 10" (1.778 m)        Abnormal Lab Results:  Labs Reviewed   CBC W/ AUTO DIFFERENTIAL - Abnormal; Notable for the following components:       Result Value    MCH 31.3 (*)     All other components within normal limits   COMPREHENSIVE METABOLIC PANEL - Abnormal; Notable for the following components:    eGFR if non  57 (*)     All other components within normal limits   TROPONIN I   B-TYPE NATRIURETIC PEPTIDE        All Lab Results:  Results for orders placed or performed during the hospital encounter of 08/12/18   CBC auto differential   Result Value Ref Range    WBC 4.02 3.90 - 12.70 K/uL    RBC 5.04 4.60 - 6.20 M/uL    Hemoglobin 15.8 14.0 - 18.0 g/dL    Hematocrit 44.9 40.0 - 54.0 %    MCV 89 82 - 98 fL    MCH 31.3 (H) 27.0 - 31.0 pg    MCHC 35.2 32.0 - 36.0 g/dL    RDW 12.3 11.5 - 14.5 %    Platelets 264 150 - 350 K/uL    MPV 10.0 9.2 - 12.9 fL    Gran # (ANC) 2.3 1.8 - 7.7 K/uL    Lymph # 1.3 1.0 - 4.8 K/uL    Mono # 0.3 0.3 - 1.0 K/uL    Eos # 0.1 0.0 - 0.5 K/uL    Baso # 0.02 0.00 - 0.20 K/uL    Gran% 56.2 38.0 - 73.0 %    Lymph% 32.3 18.0 - 48.0 %    Mono% 7.5 4.0 - 15.0 %    Eosinophil% 3.5 0.0 - 8.0 %    Basophil% 0.5 0.0 - 1.9 %    Differential Method Automated    Comprehensive metabolic panel   Result Value Ref Range    Sodium 140 136 - 145 mmol/L    Potassium 3.8 3.5 - 5.1 mmol/L    Chloride 105 95 - 110 mmol/L    CO2 25 23 - 29 mmol/L    Glucose 109 70 - 110 mg/dL    BUN, Bld 14 8 - 23 mg/dL    Creatinine 1.3 0.5 - 1.4 mg/dL    Calcium 9.6 8.7 - 10.5 mg/dL    Total Protein 7.8 6.0 - 8.4 g/dL    Albumin 3.9 3.5 - 5.2 g/dL    Total Bilirubin 0.9 0.1 - 1.0 mg/dL    Alkaline Phosphatase 95 55 - 135 U/L    AST 20 10 - 40 U/L    ALT 32 10 - 44 U/L    Anion Gap 10 8 - 16 mmol/L    eGFR if African American >60 " >60 mL/min/1.73 m^2    eGFR if non African American 57 (A) >60 mL/min/1.73 m^2   Troponin I #1   Result Value Ref Range    Troponin I <0.006 0.000 - 0.026 ng/mL   B-Type natriuretic peptide (BNP)   Result Value Ref Range    BNP <10 0 - 99 pg/mL     The EKG was ordered, reviewed, and independently interpreted by the ED provider.  Interpretation time: 9:40  Rate: 74 BPM  Rhythm: normal sinus rhythm  Interpretation: No acute ST changes. No STEMI.    The Emergency Provider reviewed the vital signs and test results, which are outlined above.    ED Discussion     9:59 AM: Patient had CXR done on 8/4 showing NAF.    11:00 AM: Reassessed pt at this time. Discussed with pt and family all pertinent ED information and results. Discussed pt dx and plan of tx. Gave pt all f/u and return to the ED instructions. All questions and concerns were addressed at this time. Pt expresses understanding of information and instructions, and is comfortable with plan to discharge. Pt is stable for discharge.    I discussed with patient and/or family/caretaker that evaluation in the ED does not suggest any emergent or life threatening medical conditions requiring immediate intervention beyond what was provided in the ED, and I believe patient is safe for discharge.  Regardless, an unremarkable evaluation in the ED does not preclude the development or presence of a serious of life threatening condition. As such, patient was instructed to return immediately for any worsening or change in current symptoms.    ED Medication(s):  Medications - No data to display    Discharge Medication List as of 8/12/2018 11:09 AM          Follow-up Information     Karen Aragon MD In 2 days.    Specialty:  Internal Medicine  Contact information:  [address]  [address]  [facility]  [place] [address]  [phone]             [facility] - .    Specialty:  Emergency Medicine  Why:  As needed, If symptoms worsen  Contact  information:  [address] [facility] [address] [phone]                  Medical Decision Making    Medical Decision Making:   History:   Old Medical Records: I decided to obtain old medical records.  Old Records Summarized: records from clinic visits.  Clinical Tests:   Lab Tests: Ordered and Reviewed  Medical Tests: Ordered and Reviewed           Scribe Attestation:   Scribe #1: I performed the above scribed service and the documentation accurately describes the services I performed. I attest to the accuracy of the note.    Attending:   Physician Attestation Statement for Scribe #1: I, Randolph Khan MD, personally performed the services described in this documentation, as scribed by Delores Che, in my presence, and it is both accurate and complete.          Clinical Impression       ICD-10-CM ICD-9-CM   1. Essential hypertension I10 401.9   2. Chest pain R07.9 786.50       Disposition:   Disposition: Discharged  Condition: Stable         Randolph Khan MD  08/12/18 7076

## 2018-09-18 ENCOUNTER — HOSPITAL ENCOUNTER (EMERGENCY)
Facility: HOSPITAL | Age: 68
Discharge: HOME | End: 2018-09-18
Attending: FAMILY MEDICINE
Payer: MEDICARE

## 2018-09-18 VITALS
HEIGHT: 70 IN | SYSTOLIC BLOOD PRESSURE: 154 MMHG | TEMPERATURE: 99 F | BODY MASS INDEX: 33.36 KG/M2 | HEART RATE: 62 BPM | WEIGHT: 233 LBS | DIASTOLIC BLOOD PRESSURE: 82 MMHG | RESPIRATION RATE: 14 BRPM | OXYGEN SATURATION: 96 %

## 2018-09-18 DIAGNOSIS — R07.9 CHEST PAIN: ICD-10-CM

## 2018-09-18 DIAGNOSIS — I10 HYPERTENSION, UNSPECIFIED TYPE: Primary | ICD-10-CM

## 2018-09-18 LAB
ALBUMIN SERPL BCP-MCNC: 4.3 G/DL
ALP SERPL-CCNC: 91 U/L
ALT SERPL W/O P-5'-P-CCNC: 34 U/L
ANION GAP SERPL CALC-SCNC: 9 MMOL/L
AST SERPL-CCNC: 28 U/L
BASOPHILS # BLD AUTO: 0.02 K/UL
BASOPHILS NFR BLD: 0.4 %
BILIRUB SERPL-MCNC: 0.6 MG/DL
BUN SERPL-MCNC: 18 MG/DL
CALCIUM SERPL-MCNC: 9.2 MG/DL
CHLORIDE SERPL-SCNC: 103 MMOL/L
CO2 SERPL-SCNC: 27 MMOL/L
CREAT SERPL-MCNC: 1.29 MG/DL
DIFFERENTIAL METHOD: NORMAL
EOSINOPHIL # BLD AUTO: 0.2 K/UL
EOSINOPHIL NFR BLD: 5.1 %
ERYTHROCYTE [DISTWIDTH] IN BLOOD BY AUTOMATED COUNT: 12.3 %
EST. GFR  (AFRICAN AMERICAN): >60 ML/MIN/1.73 M^2
EST. GFR  (NON AFRICAN AMERICAN): 57.4 ML/MIN/1.73 M^2
GLUCOSE SERPL-MCNC: 111 MG/DL
HCT VFR BLD AUTO: 45 %
HGB BLD-MCNC: 15.3 G/DL
LYMPHOCYTES # BLD AUTO: 1.8 K/UL
LYMPHOCYTES NFR BLD: 37.3 %
MCH RBC QN AUTO: 30.5 PG
MCHC RBC AUTO-ENTMCNC: 34 G/DL
MCV RBC AUTO: 90 FL
MONOCYTES # BLD AUTO: 0.6 K/UL
MONOCYTES NFR BLD: 13.5 %
NEUTROPHILS # BLD AUTO: 2.1 K/UL
NEUTROPHILS NFR BLD: 43.7 %
NT-PROBNP: 51 PG/ML
PLATELET # BLD AUTO: 243 K/UL
PMV BLD AUTO: 10.9 FL
POTASSIUM SERPL-SCNC: 3.7 MMOL/L
PROT SERPL-MCNC: 8.3 G/DL
RBC # BLD AUTO: 5.01 M/UL
SODIUM SERPL-SCNC: 139 MMOL/L
TROPONIN I SERPL DL<=0.01 NG/ML-MCNC: <0.012 NG/ML
WBC # BLD AUTO: 4.75 K/UL

## 2018-09-18 PROCEDURE — 99284 EMERGENCY DEPT VISIT MOD MDM: CPT | Mod: 25

## 2018-09-18 PROCEDURE — 93010 ELECTROCARDIOGRAM REPORT: CPT | Mod: ,,, | Performed by: INTERNAL MEDICINE

## 2018-09-18 PROCEDURE — 85025 COMPLETE CBC W/AUTO DIFF WBC: CPT

## 2018-09-18 PROCEDURE — 93005 ELECTROCARDIOGRAM TRACING: CPT

## 2018-09-18 PROCEDURE — 25000003 PHARM REV CODE 250: Performed by: FAMILY MEDICINE

## 2018-09-18 PROCEDURE — 84484 ASSAY OF TROPONIN QUANT: CPT

## 2018-09-18 PROCEDURE — 80053 COMPREHEN METABOLIC PANEL: CPT

## 2018-09-18 PROCEDURE — 83880 ASSAY OF NATRIURETIC PEPTIDE: CPT

## 2018-09-18 RX ORDER — ASPIRIN 325 MG
325 TABLET ORAL
Status: COMPLETED | OUTPATIENT
Start: 2018-09-18 | End: 2018-09-18

## 2018-09-18 RX ADMIN — ASPIRIN 325 MG ORAL TABLET 325 MG: 325 PILL ORAL at 09:09

## 2018-09-19 NOTE — ED PROVIDER NOTES
"Encounter Date: 9/18/2018       History     Chief Complaint   Patient presents with    Hypertension     "My pressure is elevated,"  HTN medication was increased by PCP yesterday, BP at home was in 200's/100's; pt denies any headache/blurry vision/weakness     68-year-old male patient recently had his blood pressure medicines adjusted comes in with hypertension test at home was 200/100 otherwise patient has no headache no chest pain or shortness of breath and orthopnea no dyspnea on exertion.          Review of patient's allergies indicates:  No Known Allergies  Past Medical History:   Diagnosis Date    Hypertension      Past Surgical History:   Procedure Laterality Date    APPENDECTOMY      COLONOSCOPY N/A 5/15/2018    Procedure: COLONOSCOPY;  Surgeon: Edison Amado Jr., MD;  Location: [facility];  Service: Endoscopy;  Laterality: N/A;    COLONOSCOPY N/A 5/15/2018    Performed by Edison Amado Jr., MD at [facility] ENDO    PROSTATE SURGERY       Family History   Problem Relation Age of Onset    No Known Problems Mother     No Known Problems Father     No Known Problems Sister      Social History     Tobacco Use    Smoking status: Former Smoker    Smokeless tobacco: Never Used   Substance Use Topics    Alcohol use: No    Drug use: No     Review of Systems   Constitutional: Negative for fever.   HENT: Negative for sore throat.    Respiratory: Negative for shortness of breath.    Cardiovascular: Negative for chest pain.   Gastrointestinal: Negative for nausea.   Genitourinary: Negative for dysuria.   Musculoskeletal: Negative for back pain.   Skin: Negative for rash.   Neurological: Negative for weakness.   Hematological: Does not bruise/bleed easily.   All other systems reviewed and are negative.      Physical Exam     Initial Vitals [09/18/18 1927]   BP Pulse Resp Temp SpO2   (!) 201/95 72 20 98.6 °F (37 °C) 98 %      MAP       --         Physical Exam    Nursing note and vitals " reviewed.  Constitutional: He appears well-developed and well-nourished.   HENT:   Head: Normocephalic and atraumatic.   Eyes: Conjunctivae and EOM are normal. Pupils are equal, round, and reactive to light.   Neck: Normal range of motion. Neck supple.   Cardiovascular: Normal rate, regular rhythm and normal heart sounds.   Pulmonary/Chest: Breath sounds normal.   Abdominal: Soft. Bowel sounds are normal.   Musculoskeletal: Normal range of motion.   Neurological: He is alert. He has normal reflexes.         ED Course   Procedures  Labs Reviewed   COMPREHENSIVE METABOLIC PANEL - Abnormal; Notable for the following components:       Result Value    Glucose 111 (*)     eGFR if non  57.4 (*)     All other components within normal limits   CBC W/ AUTO DIFFERENTIAL   TROPONIN I   NT-PRO NATRIURETIC PEPTIDE     EKG Readings: (Independently Interpreted)   Rhythm: Normal Sinus Rhythm. Heart Rate: 66. Ectopy: No Ectopy. Conduction: Normal. ST Segments: Normal ST Segments. T Waves: Normal. Clinical Impression: Normal Sinus Rhythm       Imaging Results          X-Ray Chest AP Portable (Final result)  Result time 09/18/18 21:15:58    Final result by Feroz Bender MD (09/18/18 21:15:58)                 Impression:      1.  Negative for acute process involving the chest.    2.  Stable findings as noted above.      Electronically signed by: Feroz Bender MD  Date:    09/18/2018  Time:    21:15             Narrative:    EXAMINATION:  XR CHEST AP PORTABLE    CLINICAL HISTORY:  Chest Pain;    COMPARISON:  August 4, 2018    FINDINGS:  EKG leads overlie the chest which is lordotic in position.  The lungs are clear. The cardiac silhouette size is normal. The trachea is midline and the mediastinal width is normal. Negative for focal infiltrate, effusion or pneumothorax. Pulmonary vasculature is normal. Negative for osseous abnormalities. Mild convex-left curvature of the upper thoracic spine.  Mildly ectatic and tortuous  aorta.  Marginal spondylosis.                              X-Rays:   Independently Interpreted Readings:   Other Readings:  Xray reviewed by myself and is negative. Awaiting radiology report.      Medical Decision Making:   Initial Assessment:   Patient sitting in no distress and pleasant. Patient has no other complaints other than documented.     Differential Diagnosis:   Angina, unstable angina, hypertension, hypertension urgency, hypertension emergency, myocardial infarction                        Clinical Impression:   The primary encounter diagnosis was Hypertension, unspecified type. A diagnosis of Chest pain was also pertinent to this visit.                             Perez Tafoya MD  09/18/18 [id number]

## 2018-11-07 ENCOUNTER — OFFICE VISIT (OUTPATIENT)
Dept: CARDIOLOGY | Facility: CLINIC | Age: 68
End: 2018-11-07
Payer: MEDICARE

## 2018-11-07 VITALS
HEART RATE: 64 BPM | SYSTOLIC BLOOD PRESSURE: 182 MMHG | OXYGEN SATURATION: 96 % | HEIGHT: 70 IN | DIASTOLIC BLOOD PRESSURE: 91 MMHG | WEIGHT: 224.5 LBS | BODY MASS INDEX: 32.14 KG/M2

## 2018-11-07 DIAGNOSIS — I10 ESSENTIAL HYPERTENSION: ICD-10-CM

## 2018-11-07 DIAGNOSIS — Z83.3 FAMILY HISTORY OF DIABETES MELLITUS (DM): ICD-10-CM

## 2018-11-07 DIAGNOSIS — E78.2 MIXED HYPERLIPIDEMIA: ICD-10-CM

## 2018-11-07 PROCEDURE — 99205 OFFICE O/P NEW HI 60 MIN: CPT | Mod: S$GLB,,, | Performed by: INTERNAL MEDICINE

## 2018-11-07 PROCEDURE — 99999 PR PBB SHADOW E&M-EST. PATIENT-LVL III: CPT | Mod: PBBFAC,,, | Performed by: INTERNAL MEDICINE

## 2018-11-07 PROCEDURE — 3077F SYST BP >= 140 MM HG: CPT | Mod: CPTII,S$GLB,, | Performed by: INTERNAL MEDICINE

## 2018-11-07 PROCEDURE — 1101F PT FALLS ASSESS-DOCD LE1/YR: CPT | Mod: CPTII,S$GLB,, | Performed by: INTERNAL MEDICINE

## 2018-11-07 PROCEDURE — 3080F DIAST BP >= 90 MM HG: CPT | Mod: CPTII,S$GLB,, | Performed by: INTERNAL MEDICINE

## 2018-11-07 RX ORDER — LOSARTAN POTASSIUM AND HYDROCHLOROTHIAZIDE 25; 100 MG/1; MG/1
1 TABLET ORAL DAILY
Qty: 90 TABLET | Refills: 3 | Status: SHIPPED | OUTPATIENT
Start: 2018-11-07 | End: 2019-10-28 | Stop reason: SDUPTHER

## 2018-11-07 RX ORDER — ASPIRIN 81 MG/1
81 TABLET ORAL DAILY
Qty: 30 TABLET | Refills: 12
Start: 2018-11-07

## 2018-11-07 RX ORDER — CLONIDINE HYDROCHLORIDE 0.1 MG/1
0.2 TABLET ORAL 2 TIMES DAILY
Qty: 180 TABLET | Refills: 3
Start: 2018-11-07 | End: 2018-11-07 | Stop reason: ALTCHOICE

## 2018-11-07 RX ORDER — CARVEDILOL 25 MG/1
25 TABLET ORAL 2 TIMES DAILY
Qty: 180 TABLET | Refills: 3 | Status: SHIPPED | OUTPATIENT
Start: 2018-11-07 | End: 2019-10-28 | Stop reason: SDUPTHER

## 2018-11-07 NOTE — PATIENT INSTRUCTIONS
Heart Disease Education    The heart beats 60 to 100 times per minute, 24 hours a day. This equals almost 1000,000 times a day. It pumps blood with oxygen and nutrients to the tissues and organs of the body. But the heart is a muscle and needs its own supply of blood. Blood flow to the heart is supplied by the coronary arteries. Coronary artery disease (atherosclerosis) is a result of cholesterol, saturated fat, and calcium deposits (plaques) that build up inside the walls. This causes inflammation within the coronary arteries. These plaques narrow the artery and reduce blood flow to the heart muscle. The reduction in blood flow to the heart muscle decreases oxygen supply to the heart. If the narrowing is significant enough, the oxygen supply to one or more regions of the heart can be temporarily or permanently shut down. This can cause chest pain, and possibly death of heart tissue (heart attack).  Types of chest pain  Angina is the name for pain in the heart muscle. Angina is a warning sign of serious heart disease. When untreated it can lead to a heart attack, also known as acute myocardial infarction, or AMI. Angina occurs when there is not enough blood and oxygen flowing to the heart for the amount of work it is doing. This most often happens during physical exertion, when the heart is working hardest. It is usually relieved by rest or nitroglycerin. Angina may also occur after a large meal when extra blood is sent to the digestive organs and less goes to the heart. In the case of advanced or unstable heart disease, angina can occur at rest or awaken you from sleep. Angina usually lasts from a few minutes up to 20 minutes or more. When treated early, the effects of angina can be reversed without permanent damage to the heart. Angina is a serious condition and needs to be evaluated by a medical professional immediately.  There are two types of angina -- stable and unstable:  · Stable angina usually occurs  with a predictable level of activity. Being stable, its character, severity, and occurrence do not change much over time. It usually starts with activity, and resolves with rest or taking your medicine as instructed by your doctor. The symptoms usually do not last long.  · Unstable angina changes or gets worse over time. It is different from whatever you are used to. It may feel different or worse, begin without cause, occur with exercise or exertion, wake you up from sleep, and last longer. It may not respond in the same way as it does when you take your usual medicines for an attack. This type of angina can be a warning sign of an impending heart attack.     A heart attack is usually the result of a blood clot that suddenly forms in a coronary artery that has been narrowed with plaque. When this occurs, blood flow may be cut off to a part of the heart muscle, causing the cells to die. This weakens the pumping action of the heart, which affects the delivery of blood to all the other organs in the body including the brain. This damage is not reversible. However, early treatment can limit the amount of damage.  The pain you feel with angina and a heart attack may have a similar quality. However, it is usually different in intensity and duration. Here are some typical descriptions of a heart attack:  · It is most often experienced as a squeezing, crushing, pressure-like sensation in the center of the chest.  · It is sometimes described as something heavy sitting on my chest.  · It may feel more like a bad case of indigestion.  · The pain may spread from the chest to the arm, shoulder, throat or jaw.  · Sometimes the pain is not felt in the chest at all, but only in the arm, shoulder, throat or jaw.  · There may also be nausea, vomiting, dizziness or light-headedness, sweating and trouble breathing.  · Palpitations, or your heart beating rapidly  · A new, irregular heart beat  · Unexplained weakness  You may not be  "able to tell the difference between "bad" angina and a heart attack at home. Seek help if your symptoms are different than usual. Do not be in denial or just try to "tough it out."  Call 911  This is the fastest and safest way to get to the emergency department. The paramedics can also start treatment on the way to the hospital, saving valuable time for your heart.  · If the angina gets worse, if it continues, or if it stops and returns, call 911 immediately. Do not delay. You may be having a heart attack.  · After you call 911, take a second tablet or spray unless instructed otherwise. When repeating doses, sit down if possible, because it can make you feel lightheaded or dizzy. Wait another 5 minutes. If the angina still does not go away, take a third tablet or spray. Do not take more than 3 tablets or sprays within 15 minutes. Stay on the phone with 911 for further instruction.  · Your healthcare provider may give you slightly different instructions than those above. If so, follow them carefully.  Do not wait until symptoms become severe to call 911.  Other reasons to call 911 include:  · Trouble breathing  · Feeling lightheaded, faint, or dizzy  · Rapid heart beat  · Slower than usual heart rate compared to your normal  · Angina with weakness, dizziness, fainting, heavy sweating, nausea, or vomiting  · Extreme drowsiness, confusion  · Weakness of an arm or leg or one side of the face  · Difficulty with speech or vision  When to seek medical care  Remember, the signs and symptoms of a heart attack are not always like they are on TV. Sometimes they are not so obvious. You may only feel weak, or just not right. If it is not clear or if you have any doubt, call for advice.  · Seek help if there is a change in the type of pain, if it feels different, or if your symptoms are mild.  · Do not drive yourself. Have someone else drive you. If no one can drive, call 911.  · Do not delay. Fast diagnosis and treatment can " "prevent or limit the amount of heart damage during a heart attack.  · Do not go to your doctor's office or a clinic as they may not be able to provide all the testing and treatment required for this condition.  · If your doctor has given you medicine to take when symptoms occur, take them but don't delay getting help trying to locate medicines.  What happens in the emergency department  The emergency department is connected to your local emergency medical system (EMS) through 911. That's why during a cardiac emergency, calling 911 is the fastest way to get help. The goal of the emergency department is to rapidly screen, evaluate, and treat people.  Once you are there, an electrocardiogram (ECG or heart tracing) will be done. Blood samples may be taken to look for the presence of heart enzymes that leak from damaged heart cells and show if a heart attack is occurring. You will often be evaluated by a heart specialist (cardiologist) who decides the best course of action. In the case of severe angina or early heart attack, and depending on the circumstances, powerful "clot busting" medicines can be used to dissolve blood clots in the coronary artery. In other cases, you may be taken to a cardiac catheterization lab. Here, a tiny balloon-tipped catheter is advanced through blood vessels to the heart. There the balloon is inflated pushing open the blood vessel restoring blood flow.  Risk factors for heart disease  Risk factors for heart disease are a combination of genetic and lifestyle. Many risk factors work by either directly or indirectly damaging the blood vessels of the heart, or by increasing the risk of forming blood or cholesterol clots, which then clog up and block the arteries.     Examples of physical lifestyle risk factors:  · Cigarette smoking  · High blood pressure  · High blood cholesterol  · Use of stimulant drugs such as cocaine, crack, and amphetamines  · Eating a high-fat, high-cholesterol " meal  · Diabetes   · Obesity which increases risk for diabetes and high blood pressure  · Lack of regular physical activity     Examples of emotional lifestyle factors:  · Chronic high stress levels release stress hormones. These raise blood pressure and cholesterol level and makes blood clot more easily.  · Held-in anger, hostile or cynical attitude  · Social and emotional isolation, lack of intimacy  · Loss of relationship  · Depression  Other factors that increase the risk of heart attack that you cannot control :  · Age. The older you get beyond 40, the greater is your risk of significant coronary artery disease.  · Gender. More men than women get heart disease; but once past menopause, women who are not taking estrogen replacement have the same risk as men for a heart attack.  · Family history. If your mother, father, brother or sister has coronary artery disease, your risk of having it is higher than a person your age without this family history.  What can you do to decrease your risk  To reduce your risk of heart disease:  · Get regular checkups with your doctor.  · Take your medicines for blood pressure, cholesterol or diabetes as directed.  · Watch your diet. Eat a heart healthy diet choosing fresh foods, less salt, cholesterol, and fat  · Stop smoking. Get help if needed.  · Get regular exercise.  · Manage stress.  · Carry a list of medicines and doses in your wallet.  Date Last Reviewed: 12/30/2015  © 1158-0211 [employer]. [address] [place] [address], [place] [address]. All rights reserved. This information is not intended as a substitute for professional medical care. Always follow your healthcare professional's instructions.

## 2018-11-07 NOTE — LETTER
November 7, 2018      Karen Aragon MD  [address] [place] [address]  [place] [address]           [facility] - Cardiology  [address] [place] [address], [address]  [facility] [address]  Phone: [phone]  Fax: [phone]          Patient: Luis Mendez   MR Number: [id number]   YOB: 1950   Date of Visit: 11/7/2018       Dear Dr. Karen Aragon:    Thank you for referring Luis Mendez to me for evaluation. Attached you will find relevant portions of my assessment and plan of care.    If you have questions, please do not hesitate to call me. I look forward to following Luis Mendez along with you.    Sincerely,    Bowen Rehman MD    Enclosure  CC:  No Recipients    If you would like to receive this communication electronically, please contact [email or web address] or [phone] to request more information on [facility] access.    For providers and/or their staff who would like to refer a patient to [facility], please contact us through our one-stop-shop provider referral line, [facility], at [phone].    If you feel you have received this communication in error or would no longer like to receive these types of communications, please e-mail [email or web address]

## 2018-11-07 NOTE — PROGRESS NOTES
Subjective:   Patient ID:  Luis Mendez is a 68 y.o. male who presents for evaluation and treatment of Hypertension; Hyperlipidemia; and medications adjustment      HPI:       He is here by recommendation of Dr. Aragon for management of malignant HTN. No end organ damage. No LVH. No CVA/TIA. No CHF.. Mildly elevated cr with gfr > 60. No DM. No tobacco. He recently changed his diet and started exercising. Currently taking 4 HTN agents without a diuretic.       ECG 2018: NSR      Echo 2018:   Normal EF   Normal RV function    No valve disease     Nuclear stress test 2018   Non ischemic        Bun/cr18/1.29 with gfr > 60          Patient Active Problem List    Diagnosis Date Noted    Essential hypertension 2018    Mixed hyperlipidemia 2018    Family history of diabetes mellitus (DM) 2018    Screening for colorectal cancer 05/15/2018           Right Arm BP - Sittin/95  Left Arm BP - Sittin/91        LABS    Lipid panel  Lab Results   Component Value Date    CHOL 146 2018     Lab Results   Component Value Date    HDL 46 2018     Lab Results   Component Value Date    LDLCALC 87.4 2018     Lab Results   Component Value Date    TRIG 63 2018     Lab Results   Component Value Date    CHOLHDL 31.5 2018            ROS    Objective:   Physical Exam    Assessment:     1. Essential hypertension    2. Mixed hyperlipidemia    3. Family history of diabetes mellitus (DM)        Plan:           Changes with medications     Wean off clonidine down to 0.2 mg daily x 1 week then 0.1 mg daily x 1 week then off   Stop lisinopril 40 mg po bid   Stop toprol xl      Start losartan/hctz  100/25 mg po daily   Start coreg 25 mg po bid                Enroll in digital HTN clinic      Follow up in a month  If there is no improvement he will a work up for secondary HTN  Consider adding aldactone 25 mg daily   Target BP < 130/80 mmHg        Low salt diet  Weight  loss  Exercise        Continue with current medical plan and lifestyle changes.  Return sooner for concerns or questions. If symptoms persist go to the ED  I have reviewed all pertinent data on this patient       I have reviewed the patient's medical history in detail and updated the computerized patient record.    Orders Placed This Encounter   Procedures    NURSING COMMUNICATION: [facility]    [employer] [facility] (HDMP) Enrollment Order     I. PURPOSE  To provide [facility] patients with innovative, specialized blood pressure monitoring and optimal dosing of antihypertensive therapy to improve health outcomes and decrease microvascular and macrovascular complications    II. GOALS  To maintain a systematic, coordinated and cost-effective process to monitor blood pressure in patients with hypertension  To attain and maintain optimal antihypertensive therapy while ensuring patient safety using the most recent evidence-based guidelines for the management of hypertension as reported by AHA/ACC in 2017.   Provide consultative services to providers, patients and caregivers regarding optimal antihypertensive therapy  To improve patient/caregiver understanding and compliance related to antihypertensive therapies by providing continuous patient and caregiver education about their prescribed medications and associated disease state  To provide education, guidance and reinforcement regarding lifestyle modifications including weight loss, adopting and maintaining the Dietary Approaches to Stop Hypertension or DASH diet, sodium restriction, physical activity, and moderation of alcohol consumption to patients and caregivers  Allow providers increased availability of clinic time for direct patient care   To provide patient care and education within an interdisciplinary framework and enhance partnering with other members of health care team  Improve continuity of care for high blood pressure  patients and improve patient engagement  Collect and utilize pharmacy related outcomes to improve quality of patient care    III. COLLABORATIVE PRACTICE AGREEMENT    A.  Under this collaborative practice agreement, an [facility] pharmacist according to and in compliance with [place] Board of Pharmacy Title 46, Part LIII, section 523 and [place] Board of Medical Examiners definition of the Collaborative Drug Therapy Management (CDTM) may initiate, implement, alter and monitor a therapeutic drug plan intended to manage antihypertensive therapy.  Services offered by the hypertension pharmacy specialist may include education on disease state and lifestyle modification, in addition to the drug therapy services listed above. Written and audio/visual educational materials and patient specific information may be provided to improve quality of care.    B. Primary Collaborating Physician:    Primary Physician: Mitchell Chen M.D., [facility]  , Cardiology  License number: [id number]  CDTM number:  [id number]  Telephone number: [phone]   E-mail address: [email or web address]  Emergency Contact Information: [phone]    Back-Up Physician:  Ac Laureano M.D.  Cardiology  License number:  [id number]  CDTM number:  [id number]  Telephone number:  [phone]  E-mail address: [email or web address]  Emergency Contact Information: [phone]    C.  Pharmacists:   Each of the following pharmacists will serve as the primary pharmacist on CDTM and any pharmacist may serve as the secondary pharmacist for another pharmacists agreement.  Each of the pharmacists listed below has a Pharm.D degree, with completion of an accredited pharmacy practice residency and as well as experience with managing patients along with a physician.  The outpatient monitoring service will be provided under the Cardiology Department at [facility] location in [place] at [address] [place] [address]  [id number]. The pharmacist will contact patients via telephone from a remote location.    Pharmacist: Bambi Mccormack, Meredith  This pharmacist has completed a pharmacy practice residency and has practiced clinical pharmacy for 7 years. She has experience in the areas of cardiology, acute care, and managed care. She started a pharmacist run hypertension clinic at [facility] during her residency and was published in The American Journal of Health-System Pharmacists.     [place] Pharmacist License Number: [id number]  CDTM number:  [id number]  Contact Number:  [phone]  Contact E-mail: [email or web address]  Emergency Contact Number:  [phone]    Pharmacist:  Jannie Duarte PharmD  This pharmacist has completed a pharmacy practice residency and has practiced clinical pharmacy for 17 years in the areas of cardiology, geriatric medicine, anticoagulation management, retail and ambulatory care.  She served as a pharmacy practice clinical preceptor and lead pharmacist in anticoagulation clinic for 10 years.  [place] Pharmacist License # [id number]  CDTM number:  [id number]  Contact Number:  [phone]  Contact E-mail:  [email or web address]   Emergency Contact Number:  [phone]    Pharmacist: Cassie Rico PharmD, BCACP, CDE  This pharmacist has completed a pharmacy practice residency with emphasis in ambulatory care and has practiced clinical pharmacy for 8 years in the areas of dyslipidemia, hypertension, diabetes, and anticoagulation. She is also a Certified Diabetes Educator.      [place] Pharmacist License # [id number]  CDTM number: [id number]  Contact number: [phone]  Contact E-mail:  [email or web address]  Emergency Contact Number: [phone]    Pharmacist: Sarita Ayala PharmD  This pharmacist started practicing at [facility] in 2011 following her one year residency at [facility]. She serves as an Adjunct Clinical  in the College of Pharmacy at  [employer]. She served as the lead pharmacist for the Coumadin Clinic team for 4 years.   [place] Pharmacist License: [id number]  CDTM number: [id number]  Contact number: [phone]  Contact E-mail: [email or web address]   Emergency Contact Number: [phone]    Pharmacist:  Katarzyna Wahl PharmD  This pharmacist has completed a pharmacy practice residency (PGY-1) and has practiced clinical pharmacy for 16  years in the areas of heart and abdominal transplant, retail and ambulatory care.  She was directly involved in the care of congestive heart failure, left ventricular assist device and heart transplant patients from 3035-7350.  She is currently practicing as a digital medicine clinical specialist in heart failure.    [place] Pharmacist License # [id number]  CDTM number:  [id number]  Contact Number:  [phone]  Contact E-mail:  [email or web address]   Emergency Contact Number:  [phone]    Pharmacist: Sarita Beltran PharmD  This pharmacist obtained her Pharm.D. from [employer], and has completed an Ambulatory Care Pharmacy Practice residency at [facility]. She has practiced clinical pharmacy for 9  years and has experience in the areas of Hypertension and Diabetes.      [place] Pharmacist License: [id number]  CDTM Number: [id number]  Telephone number: [phone]  E-mail: [email or web address]  Emergency Contact Number: [phone]      Pharmacist: Francia Saxena PharmD  This pharmacist has completed a pharmacy practice residency with an emphasis in ambulatory care and has practiced clinical pharmacy for one year. She has experience in the areas of diabetes, hypertension and dyslipidemia.   [place] Pharmacist License: [id number]  CDTM Number: [id number]  Contact Number: [phone]  Contact Email: [email or web address]   Emergency Contact: [phone]    Pharmacist: Helga Tamayo, PharmD, BCPS  This pharmacist has  completed a pharmacy practice residency with an emphasis in ambulatory care and is a Board Certified Pharmacotherapy Specialist (BCPS). She has practiced clinical pharmacy for  years in areas of ambulatory care, internal medicine, cardiology and specialty pharmacy.    [place] Pharmacist License Number: [id number]  CDTM number:  [id number]  Contact Number:  [phone]  Contact E-mail:  [email or web address]   Emergency Contact Number:  [phone] d.  Eligible Patients  Patients whose antihypertensive therapy is managed under this agreement must have a diagnosis of hypertension as documented in the patient record, and have established care with a provider within [facility]. All aspects of the patients hypertension medication management will be followed in collaboration with the physician treating the patients hypertension.  The patient will be seen by his or her physician per their discretion or by the recommendation by the hypertension pharmacist.  The patients case may be reviewed with clinic medical personnel as needed, but will be reported at least every 30 days to the collaborating physician regarding the patients drug therapy management. All decisions made by the pharmacist will be recorded in [facility] and are readily available for physician review. All issues outside of the scope of antihypertensive therapy shall be reported to the collaborating physician.     E.  Medications in CDTM  This collaborative practice proposal involves the management of patients who are receiving antihypertensive therapy, specifically, thiazide-type diuretics, angiotensin-converting enzyme inhibtors (ACE-I), angiotensin receptor blockers (ARB), calcium channel blockers (CCB), beta-blockers, loop diuretics, potassium-sparing diuretics, potassium supplementation, aldosterone antagonists, direct renin inhibitors, alpha-1 receptor blockers, central alpha-2 agonists, direct arterial vasodilators and  peripheral adrenergic antoagonists, or those patients in which hypertension is controlled by lifestyle modifcation alone.      F.  Clinical Procedure  All patients will be notified that a hypertension CDTM exists.  A signed physician order will be required for each patient to be enrolled into the hypertension CDTM.  Informed consent and an electronic signature will be obtained from each patient and documented in the patients record.  This patient signature will be valid for 1 year, requiring a new physician order and patient consent yearly.  The patient must also be enrolled in the [employer] communication program ([facility]) to be elegible for the monitoring program.  The following management plan will be utilized for CDTM:    Patients must submit blood pressures at a minimum of once weekly from the patient- purchased wireless blood pressure cuff. Patients who fail to comply with this requirement are subject to removal from [facility].   Evaluate the change in blood pressure, if any, from previous measurements performed on the same cuff and arm.  Determine and document contributing factors for blood pressure change.    Review initial drug therapy made by clinic physician upon program enrollment with patient to ensure compliance.    Identify target blood pressure based on age and comorbidities. Therapeutic changes will be made in collaboration with PharmD and collaborating physician based on the AHA/ACC 2017 guidelines for the treatment of hypertension.  Guide drug optimization based on patient response at 2-4 week intervals until control is achieved.  The PharmD will continue to monitor blood pressure and make adjustments to hypertension medications in order to achieve optimal blood pressure.   Order follow up labs when changing or adding ACE inhibitors and diuretics.    Refer to hypertension specialist if  blood pressure goals cannot be achieved using the noted algorithm.  Notify physician with specific problems or  request clinic visit if deemed necessary.  Document antihypertensive therapy changes, interventions, and outcomes in EMR.   Provide patient/caregiver continuous education or reinforcement regarding hypertension management,  medications and lifestyle modifications.    Laboratory Tests  Pharmacists will be authorized to order and evaluate laboratory tests directly related to the disease specific drug therapy being managed. Pharmacists will also be authorized to order additional specific labs based on patient clinical presentation or description. Pharmacists may also review other lab data available in the patient record which may be necessary for the evaluation and assessment of the impact on antihypertensive therapy (i.e. drug interaction, disease interaction, etc.).     b.  Documentation   Documentation of patient encounters and lab results will be permanently placed in the [facility] EMR.  Laboratory results will automatically populate prompting review and assessment of each patient.  Laboratory results obtained from outside laboratories will be entered into EMR manually.  This documentation will include lab values, medication changes, identification and assessment of adverse events related to therapy, antihypertensive medication dose adjustments, therapeutic management plan and follow up as well as any other information given to the patient during the telemedicine visit.    c.     1.   will be carried out through quarterly reports tracking following statistics:   a. Percent of patients requiring a change to antihypertensive medications   b. Number of emergency visits due to hypertensive urgency or emergency, hypotension or medication side effects for participating patients  Percent of patients who are controlled (BP <130/80 mmHg)  and uncontrolled (BP>130 mmHg)     2.  Patient specific quality indicators will be identified through quarterly review of the following data:   a.  Average  change in blood pressure after a dose adjument by the hypertension pharmacist    3.  A random sample of patient records shall be reviewed by the primary physician quarterly to ensure adherence by the hypertension clinical specialists to the collaborative practice agreement.     4.   measures will be reported to Pharmacy & Therapeutics Committee yearly.     References:    EMILIANO Jansen et al. 2017. 2017. Guidelines for the Prevention, Detection, Evaluation and Management of High Blood Pressure in Adults.      Order Specific Question:   BP Control Goal     Answer:   Current (2017) AHA Guidelines       Follow up as scheduled. Return sooner for concerns or questions            He expressed verbal understanding and agreed with the plan           Medication List           Accurate as of 11/7/18  1:45 PM. If you have any questions, ask your nurse or doctor.               START taking these medications    aspirin 81 MG EC tablet  Commonly known as:  ECOTRIN  Take 1 tablet (81 mg total) by mouth once daily.  Replaces:  aspirin 325 MG tablet  Started by:  Bowen Rehman MD     carvedilol 25 MG tablet  Commonly known as:  COREG  Take 1 tablet (25 mg total) by mouth 2 (two) times daily.  Started by:  Bowen Rehman MD     losartan-hydrochlorothiazide 100-25 mg 100-25 mg per tablet  Commonly known as:  HYZAAR  Take 1 tablet by mouth once daily.  Started by:  Bowen Rehman MD        CONTINUE taking these medications    amLODIPine 10 MG tablet  Commonly known as:  NORVASC  Take 1 tablet (10 mg total) by mouth once daily.     atorvastatin 40 MG tablet  Commonly known as:  LIPITOR        STOP taking these medications    aspirin 325 MG tablet  Replaced by:  aspirin 81 MG EC tablet  Stopped by:  Bowen Rehman MD     cloNIDine 0.1 MG tablet  Commonly known as:  CATAPRES  Stopped by:  Bowen Rehman MD     lisinopril 40 MG tablet  Commonly known as:  PRINIVIL,ZESTRIL  Stopped by:  Bowen Rehman MD     meclizine  25 mg tablet  Commonly known as:  ANTIVERT  Stopped by:  Bowen Rehman MD     metoprolol succinate 100 MG 24 hr tablet  Commonly known as:  TOPROL-XL  Stopped by:  Bowen Rehman MD           Where to Get Your Medications      These medications were sent to [facility] [address]  [address] [facility]  [address] [facility] [place] [address]    Phone:  [phone]   · carvedilol 25 MG tablet  · losartan-hydrochlorothiazide 100-25 mg 100-25 mg per tablet     Information about where to get these medications is not yet available    Ask your nurse or doctor about these medications  · aspirin 81 MG EC tablet

## 2018-12-19 ENCOUNTER — OFFICE VISIT (OUTPATIENT)
Dept: CARDIOLOGY | Facility: CLINIC | Age: 68
End: 2018-12-19
Payer: MEDICARE

## 2018-12-19 VITALS
DIASTOLIC BLOOD PRESSURE: 90 MMHG | SYSTOLIC BLOOD PRESSURE: 140 MMHG | BODY MASS INDEX: 30.61 KG/M2 | HEIGHT: 72 IN | HEART RATE: 75 BPM | WEIGHT: 226 LBS

## 2018-12-19 DIAGNOSIS — I10 ESSENTIAL HYPERTENSION: Primary | ICD-10-CM

## 2018-12-19 DIAGNOSIS — Z83.3 FAMILY HISTORY OF DIABETES MELLITUS (DM): ICD-10-CM

## 2018-12-19 DIAGNOSIS — E78.2 MIXED HYPERLIPIDEMIA: ICD-10-CM

## 2018-12-19 PROCEDURE — 3077F SYST BP >= 140 MM HG: CPT | Mod: CPTII,S$GLB,, | Performed by: INTERNAL MEDICINE

## 2018-12-19 PROCEDURE — 3080F DIAST BP >= 90 MM HG: CPT | Mod: CPTII,S$GLB,, | Performed by: INTERNAL MEDICINE

## 2018-12-19 PROCEDURE — 1101F PT FALLS ASSESS-DOCD LE1/YR: CPT | Mod: CPTII,S$GLB,, | Performed by: INTERNAL MEDICINE

## 2018-12-19 PROCEDURE — 99214 OFFICE O/P EST MOD 30 MIN: CPT | Mod: S$GLB,,, | Performed by: INTERNAL MEDICINE

## 2018-12-19 PROCEDURE — 99999 PR PBB SHADOW E&M-EST. PATIENT-LVL III: CPT | Mod: PBBFAC,,, | Performed by: INTERNAL MEDICINE

## 2018-12-19 RX ORDER — SPIRONOLACTONE 25 MG/1
25 TABLET ORAL DAILY
Qty: 90 TABLET | Refills: 3 | Status: ON HOLD | OUTPATIENT
Start: 2018-12-19 | End: 2023-09-15 | Stop reason: HOSPADM

## 2018-12-19 NOTE — PROGRESS NOTES
Subjective:   Patient ID:  Luis Mendez is a 68 y.o. male who presents for evaluation and treatment of Hypertension; Hyperlipidemia; and medication adjustment      HPI:       He is here by recommendation of Dr. Aragon for management of malignant HTN. No end organ damage. No LVH. No CVA/TIA. No CHF.. Mildly elevated cr with gfr > 60. No DM. No tobacco. He recently changed his diet and started exercising. Medications were adjusted after last visit. He is taking losartan/hctz 100/25, coreg 25 bid, and norvasc 10 mg daily. -150 mmHg. He is no longer taking clonidine. He is waiting for digital hypertension clinic phone call for enrollment.         ECG 2018: NSR      Echo 2018:   Normal EF   Normal RV function    No valve disease     Nuclear stress test 2018   Non ischemic        Bun/cr18/1.29 with gfr > 60          Patient Active Problem List    Diagnosis Date Noted    Essential hypertension 2018    Mixed hyperlipidemia 2018    Family history of diabetes mellitus (DM) 2018    Screening for colorectal cancer 05/15/2018           Right Arm BP - Sittin/90  Left Arm BP - Sittin/87        LABS    Lipid panel  Lab Results   Component Value Date    CHOL 146 2018     Lab Results   Component Value Date    HDL 46 2018     Lab Results   Component Value Date    LDLCALC 87.4 2018     Lab Results   Component Value Date    TRIG 63 2018     Lab Results   Component Value Date    CHOLHDL 31.5 2018            Review of Systems   Constitution: Negative for diaphoresis, weakness, night sweats, weight gain and weight loss.   HENT: Negative for congestion.    Eyes: Negative for blurred vision, discharge and double vision.   Cardiovascular: Negative for chest pain, claudication, cyanosis, dyspnea on exertion, irregular heartbeat, leg swelling, near-syncope, orthopnea, palpitations, paroxysmal nocturnal dyspnea and syncope.   Respiratory: Negative for cough,  shortness of breath and wheezing.    Endocrine: Negative for cold intolerance, heat intolerance and polyphagia.   Hematologic/Lymphatic: Negative for adenopathy and bleeding problem. Does not bruise/bleed easily.   Skin: Negative for dry skin and nail changes.   Musculoskeletal: Negative for arthritis, back pain, falls, joint pain, myalgias and neck pain.   Gastrointestinal: Negative for bloating, abdominal pain, change in bowel habit and constipation.   Genitourinary: Negative for bladder incontinence, dysuria, flank pain, genital sores and missed menses.   Neurological: Negative for aphonia, brief paralysis, difficulty with concentration and dizziness.   Psychiatric/Behavioral: Negative for altered mental status and memory loss. The patient does not have insomnia.    Allergic/Immunologic: Negative for environmental allergies.       Objective:   Physical Exam   Constitutional: He is oriented to person, place, and time. He appears well-developed and well-nourished. He is not intubated.   HENT:   Head: Normocephalic and atraumatic.   Right Ear: External ear normal.   Left Ear: External ear normal.   Mouth/Throat: Oropharynx is clear and moist.   Eyes: Conjunctivae and EOM are normal. Pupils are equal, round, and reactive to light. Right eye exhibits no discharge. Left eye exhibits no discharge. No scleral icterus.   Neck: Normal range of motion. Neck supple. Normal carotid pulses, no hepatojugular reflux and no JVD present. Carotid bruit is not present. No tracheal deviation present. No thyromegaly present.   Cardiovascular: Normal rate, regular rhythm, S1 normal and S2 normal.  No extrasystoles are present. PMI is not displaced. Exam reveals no gallop, no S3, no distant heart sounds, no friction rub and no midsystolic click.   No murmur heard.  Pulses:       Carotid pulses are 2+ on the right side, and 2+ on the left side.       Radial pulses are 2+ on the right side, and 2+ on the left side.        Femoral pulses  are 2+ on the right side, and 2+ on the left side.       Popliteal pulses are 2+ on the right side, and 2+ on the left side.        Dorsalis pedis pulses are 2+ on the right side, and 2+ on the left side.        Posterior tibial pulses are 2+ on the right side, and 2+ on the left side.   Pulmonary/Chest: Effort normal and breath sounds normal. No accessory muscle usage or stridor. No apnea, no tachypnea and no bradypnea. He is not intubated. No respiratory distress. He has no decreased breath sounds. He has no wheezes. He has no rales. He exhibits no tenderness and no bony tenderness.   Abdominal: He exhibits no distension, no pulsatile liver, no abdominal bruit, no ascites, no pulsatile midline mass and no mass. There is no tenderness. There is no rebound and no guarding.   Musculoskeletal: Normal range of motion. He exhibits no edema or tenderness.   Lymphadenopathy:     He has no cervical adenopathy.   Neurological: He is alert and oriented to person, place, and time. He has normal reflexes. No cranial nerve deficit. Coordination normal.   Skin: Skin is warm. No rash noted. No erythema. No pallor.   Psychiatric: He has a normal mood and affect. His behavior is normal. Judgment and thought content normal.       Assessment:     1. Essential hypertension    2. Mixed hyperlipidemia    3. Family history of diabetes mellitus (DM)        Plan:         Add aldactone 25 mg daily  Enroll in digital HTN clinic  Target BP < 130/80 mmHg        Follow up in 6 months  In the interim his BP will be monitored closely in collaboration with the digital HTN team         Low salt diet  Weight loss  Exercise        Continue with current medical plan and lifestyle changes.  Return sooner for concerns or questions. If symptoms persist go to the ED  I have reviewed all pertinent data on this patient           Follow up as scheduled. Return sooner for concerns or questions            He expressed verbal understanding and agreed with the  plan    Greater than 50% of the visit of 45 minutes was spent counseling, educating, and coordinating the care of the patient.             Medication List           Accurate as of 12/19/18  9:37 AM. If you have any questions, ask your nurse or doctor.               START taking these medications    spironolactone 25 MG tablet  Commonly known as:  ALDACTONE  Take 1 tablet (25 mg total) by mouth once daily.  Started by:  Bowen Rehman MD        CONTINUE taking these medications    amLODIPine 10 MG tablet  Commonly known as:  NORVASC  Take 1 tablet (10 mg total) by mouth once daily.     aspirin 81 MG EC tablet  Commonly known as:  ECOTRIN  Take 1 tablet (81 mg total) by mouth once daily.     atorvastatin 40 MG tablet  Commonly known as:  LIPITOR     carvedilol 25 MG tablet  Commonly known as:  COREG  Take 1 tablet (25 mg total) by mouth 2 (two) times daily.     losartan-hydrochlorothiazide 100-25 mg 100-25 mg per tablet  Commonly known as:  HYZAAR  Take 1 tablet by mouth once daily.           Where to Get Your Medications      These medications were sent to [facility] [place] - [id number] [facility]  [address] [place] [address]    Phone:  [phone]   · spironolactone 25 MG tablet

## 2018-12-19 NOTE — PATIENT INSTRUCTIONS
"  Discharge Instructions: Taking Blood Pressure Medications  You have been diagnosed with high blood pressure (hypertension). Diet and exercise help control high blood pressure. Many people also need the help of one or more medicines. Here are the main types of blood pressure medicines and how they work.  Diuretics  Diuretics are sometimes called "water pills" because they work in the kidney and flush excess water and sodium (salt) from the body. These are one of the most common and  important medicines for the management of blood pressure.  Beta-blockers  Beta-blockers reduce nerve impulses to the heart and blood vessels. This makes the heart beat slower and with less force. Your blood pressure drops, and your heart does not have to work as hard, which may improve pumping function.  ACE inhibitors  ACE inhibitors cause the vessels to relax. This helps the blood flow better and lowers blood pressure.  Angiotensin antagonists  Angiotensin antagonists shield blood vessels from a hormone that causes the blood vessels to get stiff and narrow. Your vessels become wider, and your blood pressure goes down.  Calcium channel blockers (CCBs)  CCBs keep calcium from entering the muscle cells of the heart and blood vessels. This causes blood vessels to relax, and your blood pressure goes down.  Alpha-blockers  Alpha-blockers reduce nerve impulses to blood vessels. This allows blood to pass easily, causing blood pressure to go down.  Alpha-beta blockers  Alpha-beta blockers work the same way as alpha-blockers but also slow your heartbeat. As a result, less blood is pumped through your blood vessels and your blood pressure goes down.  Vasodilators  Vasodilators directly open blood vessels by relaxing the muscle in the vessel walls, causing blood pressure to go down.  Date Last Reviewed: 4/27/2016  © 8907-1697 [employer]. [address] [place] [address], [place] [address]. All rights reserved. This information is not " intended as a substitute for professional medical care. Always follow your healthcare professional's instructions.

## 2019-03-06 ENCOUNTER — OFFICE VISIT (OUTPATIENT)
Dept: CARDIOLOGY | Facility: CLINIC | Age: 69
End: 2019-03-06
Payer: MEDICARE

## 2019-03-06 VITALS
HEIGHT: 72 IN | BODY MASS INDEX: 32.1 KG/M2 | SYSTOLIC BLOOD PRESSURE: 132 MMHG | HEART RATE: 72 BPM | DIASTOLIC BLOOD PRESSURE: 72 MMHG | WEIGHT: 237 LBS

## 2019-03-06 DIAGNOSIS — E78.2 MIXED HYPERLIPIDEMIA: ICD-10-CM

## 2019-03-06 DIAGNOSIS — Z83.3 FAMILY HISTORY OF DIABETES MELLITUS (DM): ICD-10-CM

## 2019-03-06 DIAGNOSIS — I10 ESSENTIAL HYPERTENSION: ICD-10-CM

## 2019-03-06 DIAGNOSIS — G47.33 OSA (OBSTRUCTIVE SLEEP APNEA): Primary | ICD-10-CM

## 2019-03-06 DIAGNOSIS — R06.09 DYSPNEA ON EXERTION: ICD-10-CM

## 2019-03-06 PROCEDURE — 1101F PR PT FALLS ASSESS DOC 0-1 FALLS W/OUT INJ PAST YR: ICD-10-PCS | Mod: CPTII,S$GLB,, | Performed by: INTERNAL MEDICINE

## 2019-03-06 PROCEDURE — 3078F DIAST BP <80 MM HG: CPT | Mod: CPTII,S$GLB,, | Performed by: INTERNAL MEDICINE

## 2019-03-06 PROCEDURE — 99215 PR OFFICE/OUTPT VISIT, EST, LEVL V, 40-54 MIN: ICD-10-PCS | Mod: S$GLB,,, | Performed by: INTERNAL MEDICINE

## 2019-03-06 PROCEDURE — 3075F SYST BP GE 130 - 139MM HG: CPT | Mod: CPTII,S$GLB,, | Performed by: INTERNAL MEDICINE

## 2019-03-06 PROCEDURE — 99999 PR PBB SHADOW E&M-EST. PATIENT-LVL III: ICD-10-PCS | Mod: PBBFAC,,, | Performed by: INTERNAL MEDICINE

## 2019-03-06 PROCEDURE — 1101F PT FALLS ASSESS-DOCD LE1/YR: CPT | Mod: CPTII,S$GLB,, | Performed by: INTERNAL MEDICINE

## 2019-03-06 PROCEDURE — 99215 OFFICE O/P EST HI 40 MIN: CPT | Mod: S$GLB,,, | Performed by: INTERNAL MEDICINE

## 2019-03-06 PROCEDURE — 99999 PR PBB SHADOW E&M-EST. PATIENT-LVL III: CPT | Mod: PBBFAC,,, | Performed by: INTERNAL MEDICINE

## 2019-03-06 PROCEDURE — 3078F PR MOST RECENT DIASTOLIC BLOOD PRESSURE < 80 MM HG: ICD-10-PCS | Mod: CPTII,S$GLB,, | Performed by: INTERNAL MEDICINE

## 2019-03-06 PROCEDURE — 3075F PR MOST RECENT SYSTOLIC BLOOD PRESS GE 130-139MM HG: ICD-10-PCS | Mod: CPTII,S$GLB,, | Performed by: INTERNAL MEDICINE

## 2019-03-06 NOTE — PROGRESS NOTES
Subjective:   Patient ID:  Luis Mendez is a 68 y.o. male who presents for evaluation and treatment of Hypertension; Hyperlipidemia; Obesity; and Snoring      HPI:       He is here by recommendation of Dr. Aragon for management of malignant HTN. No end organ damage. No LVH. No CVA/TIA. No CHF.. Mildly elevated cr with gfr > 60. No DM. No tobacco. He recently changed his diet and started exercising. Medications were adjusted after last visit. He is taking losartan/hctz 100/25, coreg 25 bid, aldactone 25 mg, and norvasc 10 mg daily.  mmHg. He is no longer taking clonidine. He is waiting for digital hypertension clinic phone call for enrollment.         ECG 2018: NSR      Echo 2018:   Normal EF   Normal RV function    No valve disease     Nuclear stress test 2018   Non ischemic        Bun/cr18/1.29 with gfr > 60          Patient Active Problem List    Diagnosis Date Noted    Dyspnea on exertion 2019    Essential hypertension 2018    Mixed hyperlipidemia 2018    Family history of diabetes mellitus (DM) 2018    Screening for colorectal cancer 05/15/2018           Right Arm BP - Sittin/72  Left Arm BP - Sittin/72        LABS    Lipid panel  Lab Results   Component Value Date    CHOL 146 2018     Lab Results   Component Value Date    HDL 46 2018     Lab Results   Component Value Date    LDLCALC 87.4 2018     Lab Results   Component Value Date    TRIG 63 2018     Lab Results   Component Value Date    CHOLHDL 31.5 2018            Review of Systems   Constitution: Negative for diaphoresis, weakness, night sweats, weight gain and weight loss.   HENT: Negative for congestion.    Eyes: Negative for blurred vision, discharge and double vision.   Cardiovascular: Negative for chest pain, claudication, cyanosis, dyspnea on exertion, irregular heartbeat, leg swelling, near-syncope, orthopnea, palpitations, paroxysmal nocturnal dyspnea and syncope.    Respiratory: Negative for cough, shortness of breath and wheezing.    Endocrine: Negative for cold intolerance, heat intolerance and polyphagia.   Hematologic/Lymphatic: Negative for adenopathy and bleeding problem. Does not bruise/bleed easily.   Skin: Negative for dry skin and nail changes.   Musculoskeletal: Negative for arthritis, back pain, falls, joint pain, myalgias and neck pain.   Gastrointestinal: Negative for bloating, abdominal pain, change in bowel habit and constipation.   Genitourinary: Negative for bladder incontinence, dysuria, flank pain, genital sores and missed menses.   Neurological: Negative for aphonia, brief paralysis, difficulty with concentration and dizziness.   Psychiatric/Behavioral: Negative for altered mental status and memory loss. The patient does not have insomnia.    Allergic/Immunologic: Negative for environmental allergies.       Objective:   Physical Exam   Constitutional: He is oriented to person, place, and time. He appears well-developed and well-nourished. He is not intubated.   HENT:   Head: Normocephalic and atraumatic.   Right Ear: External ear normal.   Left Ear: External ear normal.   Mouth/Throat: Oropharynx is clear and moist.   Eyes: Conjunctivae and EOM are normal. Pupils are equal, round, and reactive to light. Right eye exhibits no discharge. Left eye exhibits no discharge. No scleral icterus.   Neck: Normal range of motion. Neck supple. Normal carotid pulses, no hepatojugular reflux and no JVD present. Carotid bruit is not present. No tracheal deviation present. No thyromegaly present.   Cardiovascular: Normal rate, regular rhythm, S1 normal and S2 normal.  No extrasystoles are present. PMI is not displaced. Exam reveals no gallop, no S3, no distant heart sounds, no friction rub and no midsystolic click.   No murmur heard.  Pulses:       Carotid pulses are 2+ on the right side, and 2+ on the left side.       Radial pulses are 2+ on the right side, and 2+ on the  left side.        Femoral pulses are 2+ on the right side, and 2+ on the left side.       Popliteal pulses are 2+ on the right side, and 2+ on the left side.        Dorsalis pedis pulses are 2+ on the right side, and 2+ on the left side.        Posterior tibial pulses are 2+ on the right side, and 2+ on the left side.   Pulmonary/Chest: Effort normal and breath sounds normal. No accessory muscle usage or stridor. No apnea, no tachypnea and no bradypnea. He is not intubated. No respiratory distress. He has no decreased breath sounds. He has no wheezes. He has no rales. He exhibits no tenderness and no bony tenderness.   Abdominal: He exhibits no distension, no pulsatile liver, no abdominal bruit, no ascites, no pulsatile midline mass and no mass. There is no tenderness. There is no rebound and no guarding.   Musculoskeletal: Normal range of motion. He exhibits no edema or tenderness.   Lymphadenopathy:     He has no cervical adenopathy.   Neurological: He is alert and oriented to person, place, and time. He has normal reflexes. No cranial nerve deficit. Coordination normal.   Skin: Skin is warm. No rash noted. No erythema. No pallor.   Psychiatric: He has a normal mood and affect. His behavior is normal. Judgment and thought content normal.       Assessment:     1. GISELA (obstructive sleep apnea)    2. Essential hypertension    3. Mixed hyperlipidemia    4. Family history of diabetes mellitus (DM)    5. Dyspnea on exertion        Plan:         Enroll in digital HTN clinic  Target BP < 130/80 mmHg        Follow up in 6 months  In the interim his BP will be monitored closely in collaboration with the digital HTN team         Low salt diet  Weight loss  Exercise        Continue with current medical plan and lifestyle changes.  Return sooner for concerns or questions. If symptoms persist go to the ED  I have reviewed all pertinent data on this patient           Follow up as scheduled. Return sooner for concerns or  questions            He expressed verbal understanding and agreed with the plan    Greater than 50% of the visit of 45 minutes was spent counseling, educating, and coordinating the care of the patient.        I have reviewed the patient's medical history in detail and updated the computerized patient record.    Orders Placed This Encounter   Procedures    Polysomnogram (CPAP will be added if patient meets diagnostic criteria.)     Standing Status:   Future     Standing Expiration Date:   3/5/2020    Ambulatory consult to Sleep Disorders     Referral Priority:   Routine     Referral Type:   Consultation     Requested Specialty:   Sleep Medicine     Number of Visits Requested:   1    Ambulatory consult to Sleep Disorders     Referral Priority:   Routine     Referral Type:   Consultation     Requested Specialty:   Sleep Medicine     Number of Visits Requested:   1       Follow up as scheduled. Return sooner for concerns or questions              Patient's Medications   New Prescriptions    No medications on file   Previous Medications    AMLODIPINE (NORVASC) 10 MG TABLET    Take 1 tablet (10 mg total) by mouth once daily.    ASPIRIN (ECOTRIN) 81 MG EC TABLET    Take 1 tablet (81 mg total) by mouth once daily.    ATORVASTATIN (LIPITOR) 40 MG TABLET    Take 40 mg by mouth once daily.    CARVEDILOL (COREG) 25 MG TABLET    Take 1 tablet (25 mg total) by mouth 2 (two) times daily.    LOSARTAN-HYDROCHLOROTHIAZIDE 100-25 MG (HYZAAR) 100-25 MG PER TABLET    Take 1 tablet by mouth once daily.    SPIRONOLACTONE (ALDACTONE) 25 MG TABLET    Take 1 tablet (25 mg total) by mouth once daily.   Modified Medications    No medications on file   Discontinued Medications    No medications on file

## 2019-03-18 ENCOUNTER — HOSPITAL ENCOUNTER (OUTPATIENT)
Dept: RADIOLOGY | Facility: HOSPITAL | Age: 69
Discharge: HOME | End: 2019-03-18
Attending: INTERNAL MEDICINE
Payer: MEDICARE

## 2019-03-18 DIAGNOSIS — M25.551 PAIN IN RIGHT HIP: Primary | ICD-10-CM

## 2019-03-18 DIAGNOSIS — M25.551 PAIN IN RIGHT HIP: ICD-10-CM

## 2019-03-18 PROCEDURE — 73502 X-RAY EXAM HIP UNI 2-3 VIEWS: CPT | Mod: TC,FY,PO,RT

## 2019-03-25 ENCOUNTER — TELEPHONE (OUTPATIENT)
Dept: SLEEP MEDICINE | Facility: OTHER | Age: 69
End: 2019-03-25

## 2019-04-09 ENCOUNTER — TELEPHONE (OUTPATIENT)
Dept: SLEEP MEDICINE | Facility: OTHER | Age: 69
End: 2019-04-09

## 2019-05-08 ENCOUNTER — TELEPHONE (OUTPATIENT)
Dept: SLEEP MEDICINE | Facility: OTHER | Age: 69
End: 2019-05-08

## 2019-05-15 ENCOUNTER — TELEPHONE (OUTPATIENT)
Dept: SLEEP MEDICINE | Facility: OTHER | Age: 69
End: 2019-05-15

## 2019-05-22 ENCOUNTER — TELEPHONE (OUTPATIENT)
Dept: SLEEP MEDICINE | Facility: OTHER | Age: 69
End: 2019-05-22

## 2019-05-29 DIAGNOSIS — N63.0 LUMP OR MASS IN BREAST: ICD-10-CM

## 2019-05-29 DIAGNOSIS — N62 HYPERTROPHY OF BREAST: Primary | ICD-10-CM

## 2019-05-29 DIAGNOSIS — N63.0 LUMP, BREAST: ICD-10-CM

## 2019-06-10 ENCOUNTER — HOSPITAL ENCOUNTER (OUTPATIENT)
Dept: RADIOLOGY | Facility: HOSPITAL | Age: 69
Discharge: HOME | End: 2019-06-10
Attending: INTERNAL MEDICINE
Payer: MEDICARE

## 2019-06-10 DIAGNOSIS — N63.0 LUMP, BREAST: ICD-10-CM

## 2019-06-10 DIAGNOSIS — N62 HYPERTROPHY OF BREAST: ICD-10-CM

## 2019-06-10 DIAGNOSIS — C61 PROSTATE CANCER: ICD-10-CM

## 2019-06-10 PROCEDURE — 76642 ULTRASOUND BREAST LIMITED: CPT | Mod: TC,50,PO

## 2019-06-10 PROCEDURE — 77066 DX MAMMO INCL CAD BI: CPT | Mod: TC,PO

## 2019-10-29 RX ORDER — LOSARTAN POTASSIUM AND HYDROCHLOROTHIAZIDE 25; 100 MG/1; MG/1
1 TABLET ORAL DAILY
Qty: 90 TABLET | Refills: 3 | Status: SHIPPED | OUTPATIENT
Start: 2019-10-29 | End: 2023-02-08 | Stop reason: SDUPTHER

## 2019-10-29 RX ORDER — CARVEDILOL 25 MG/1
TABLET ORAL
Qty: 180 TABLET | Refills: 3 | Status: ON HOLD | OUTPATIENT
Start: 2019-10-29 | End: 2023-09-15 | Stop reason: SDUPTHER

## 2020-03-10 ENCOUNTER — HOSPITAL ENCOUNTER (OUTPATIENT)
Dept: RADIOLOGY | Facility: HOSPITAL | Age: 70
Discharge: HOME | End: 2020-03-10
Attending: INTERNAL MEDICINE
Payer: MEDICARE

## 2020-03-10 DIAGNOSIS — N62 HYPERTROPHY OF BREAST: ICD-10-CM

## 2020-03-10 PROCEDURE — 76642 ULTRASOUND BREAST LIMITED: CPT | Mod: TC,PO,LT

## 2020-03-10 PROCEDURE — 77066 DX MAMMO INCL CAD BI: CPT | Mod: TC,PO

## 2020-10-05 ENCOUNTER — HOSPITAL ENCOUNTER (OUTPATIENT)
Dept: RADIOLOGY | Facility: HOSPITAL | Age: 70
Discharge: HOME | End: 2020-10-05
Attending: INTERNAL MEDICINE
Payer: MEDICARE

## 2020-10-05 DIAGNOSIS — R92.8 ABNORMAL MAMMOGRAM: ICD-10-CM

## 2020-10-05 DIAGNOSIS — N63.0 UNSPECIFIED LUMP IN UNSPECIFIED BREAST: ICD-10-CM

## 2020-10-05 PROCEDURE — 76642 ULTRASOUND BREAST LIMITED: CPT | Mod: TC,PO,LT

## 2020-10-05 PROCEDURE — 77065 DX MAMMO INCL CAD UNI: CPT | Mod: TC,PO,LT

## 2020-10-05 PROCEDURE — 77061 BREAST TOMOSYNTHESIS UNI: CPT | Mod: TC,PO,LT

## 2020-12-08 DIAGNOSIS — M54.9 DORSALGIA, UNSPECIFIED: ICD-10-CM

## 2020-12-08 DIAGNOSIS — R10.9 ABDOMINAL PAIN: ICD-10-CM

## 2020-12-08 DIAGNOSIS — R10.9 UNSPECIFIED ABDOMINAL PAIN: Primary | ICD-10-CM

## 2020-12-14 ENCOUNTER — HOSPITAL ENCOUNTER (OUTPATIENT)
Dept: RADIOLOGY | Facility: HOSPITAL | Age: 70
Discharge: HOME | End: 2020-12-14
Attending: INTERNAL MEDICINE
Payer: MEDICARE

## 2020-12-14 DIAGNOSIS — R10.9 UNSPECIFIED ABDOMINAL PAIN: ICD-10-CM

## 2020-12-14 DIAGNOSIS — M54.9 DORSALGIA, UNSPECIFIED: ICD-10-CM

## 2020-12-14 PROCEDURE — 76700 US EXAM ABDOM COMPLETE: CPT | Mod: TC,PO

## 2020-12-14 PROCEDURE — 72100 X-RAY EXAM L-S SPINE 2/3 VWS: CPT | Mod: TC,FY,PO

## 2020-12-14 PROCEDURE — 76856 US EXAM PELVIC COMPLETE: CPT | Mod: TC,PO

## 2021-04-14 ENCOUNTER — HOSPITAL ENCOUNTER (OUTPATIENT)
Dept: RADIOLOGY | Facility: HOSPITAL | Age: 71
Discharge: HOME | End: 2021-04-14
Attending: INTERNAL MEDICINE
Payer: MEDICARE

## 2021-04-14 DIAGNOSIS — N63.0 UNSPECIFIED LUMP IN UNSPECIFIED BREAST: ICD-10-CM

## 2021-04-14 PROCEDURE — 76642 ULTRASOUND BREAST LIMITED: CPT | Mod: TC,PO,LT

## 2021-04-14 PROCEDURE — 77062 BREAST TOMOSYNTHESIS BI: CPT | Mod: GA,TC,PO

## 2021-04-28 ENCOUNTER — LAB VISIT (OUTPATIENT)
Dept: LAB | Facility: HOSPITAL | Age: 71
End: 2021-04-28
Attending: INTERNAL MEDICINE
Payer: MEDICARE

## 2021-04-28 DIAGNOSIS — N63.0 UNSPECIFIED LUMP IN UNSPECIFIED BREAST: Primary | ICD-10-CM

## 2021-04-28 DIAGNOSIS — I10 ESSENTIAL (PRIMARY) HYPERTENSION: ICD-10-CM

## 2021-04-28 LAB
ALBUMIN SERPL BCP-MCNC: 4.4 G/DL (ref 3.5–5.2)
ALP SERPL-CCNC: 94 U/L (ref 38–126)
ALT SERPL W/O P-5'-P-CCNC: 43 U/L (ref 10–44)
ANION GAP SERPL CALC-SCNC: 10 MMOL/L (ref 8–16)
AST SERPL-CCNC: 33 U/L (ref 15–46)
BASOPHILS # BLD AUTO: 0.03 K/UL (ref 0–0.2)
BASOPHILS NFR BLD: 0.6 % (ref 0–1.9)
BILIRUB SERPL-MCNC: 0.8 MG/DL (ref 0.1–1)
CALCIUM SERPL-MCNC: 9.5 MG/DL (ref 8.7–10.5)
CHLORIDE SERPL-SCNC: 101 MMOL/L (ref 95–110)
CHOLEST SERPL-MCNC: 131 MG/DL (ref 120–199)
CHOLEST/HDLC SERPL: 3 {RATIO} (ref 2–5)
CO2 SERPL-SCNC: 31 MMOL/L (ref 23–29)
CREAT SERPL-MCNC: 1.56 MG/DL (ref 0.5–1.4)
DIFFERENTIAL METHOD: ABNORMAL
EOSINOPHIL # BLD AUTO: 0.3 K/UL (ref 0–0.5)
EOSINOPHIL NFR BLD: 7.2 % (ref 0–8)
ERYTHROCYTE [DISTWIDTH] IN BLOOD BY AUTOMATED COUNT: 11.9 % (ref 11.5–14.5)
EST. GFR  (AFRICAN AMERICAN): 52 ML/MIN/1.73 M^2
EST. GFR  (NON AFRICAN AMERICAN): 45 ML/MIN/1.73 M^2
GLUCOSE SERPL-MCNC: 109 MG/DL (ref 70–110)
HCT VFR BLD AUTO: 43.9 % (ref 40–54)
HDLC SERPL-MCNC: 43 MG/DL (ref 40–75)
HDLC SERPL: 32.8 % (ref 20–50)
HGB BLD-MCNC: 14.9 G/DL (ref 14–18)
IMM GRANULOCYTES # BLD AUTO: 0 K/UL (ref 0–0.04)
IMM GRANULOCYTES NFR BLD AUTO: 0 % (ref 0–0.5)
LDLC SERPL CALC-MCNC: 74.8 MG/DL (ref 63–159)
LYMPHOCYTES # BLD AUTO: 2 K/UL (ref 1–4.8)
LYMPHOCYTES NFR BLD: 41.8 % (ref 18–48)
MCH RBC QN AUTO: 31 PG (ref 27–31)
MCHC RBC AUTO-ENTMCNC: 33.9 G/DL (ref 32–36)
MCV RBC AUTO: 92 FL (ref 82–98)
MONOCYTES # BLD AUTO: 0.7 K/UL (ref 0.3–1)
MONOCYTES NFR BLD: 13.7 % (ref 4–15)
NEUTROPHILS # BLD AUTO: 1.7 K/UL (ref 1.8–7.7)
NEUTROPHILS NFR BLD: 36.7 % (ref 38–73)
NONHDLC SERPL-MCNC: 88 MG/DL
NRBC BLD-RTO: 0 /100 WBC
PLATELET # BLD AUTO: 248 K/UL (ref 150–450)
PMV BLD AUTO: 10.7 FL (ref 9.2–12.9)
POTASSIUM SERPL-SCNC: 3.7 MMOL/L (ref 3.5–5.1)
PROT SERPL-MCNC: 8.2 G/DL (ref 6–8.4)
RBC # BLD AUTO: 4.8 M/UL (ref 4.6–6.2)
SODIUM SERPL-SCNC: 142 MMOL/L (ref 136–145)
TRIGL SERPL-MCNC: 66 MG/DL (ref 30–150)
TSH SERPL DL<=0.005 MIU/L-ACNC: 1.39 UIU/ML (ref 0.4–4)
UUN UR-MCNC: 25 MG/DL (ref 2–20)
WBC # BLD AUTO: 4.74 K/UL (ref 3.9–12.7)

## 2021-04-28 PROCEDURE — 84443 ASSAY THYROID STIM HORMONE: CPT | Mod: PO | Performed by: INTERNAL MEDICINE

## 2021-04-28 PROCEDURE — 36415 COLL VENOUS BLD VENIPUNCTURE: CPT | Mod: PO | Performed by: INTERNAL MEDICINE

## 2021-04-28 PROCEDURE — 80053 COMPREHEN METABOLIC PANEL: CPT | Mod: PO | Performed by: INTERNAL MEDICINE

## 2021-04-28 PROCEDURE — 85025 COMPLETE CBC W/AUTO DIFF WBC: CPT | Mod: PO | Performed by: INTERNAL MEDICINE

## 2021-04-28 PROCEDURE — 80061 LIPID PANEL: CPT | Performed by: INTERNAL MEDICINE

## 2021-05-07 ENCOUNTER — HOSPITAL ENCOUNTER (OUTPATIENT)
Dept: RADIOLOGY | Facility: HOSPITAL | Age: 71
Discharge: HOME | End: 2021-05-07
Attending: INTERNAL MEDICINE
Payer: MEDICARE

## 2021-05-07 DIAGNOSIS — R92.8 ABNORMAL MAMMOGRAM: ICD-10-CM

## 2021-05-07 PROCEDURE — 77065 DX MAMMO INCL CAD UNI: CPT | Mod: 26,LT,, | Performed by: RADIOLOGY

## 2021-05-07 PROCEDURE — 19083 US BREAST BIOPSY WITH IMAGING 1ST SITE LEFT: ICD-10-PCS | Mod: LT,,, | Performed by: RADIOLOGY

## 2021-05-07 PROCEDURE — A4550 SURGICAL TRAYS: HCPCS

## 2021-05-07 PROCEDURE — 77065 DX MAMMO INCL CAD UNI: CPT | Mod: TC,LT

## 2021-05-07 PROCEDURE — 88341 IMHCHEM/IMCYTCHM EA ADD ANTB: CPT | Performed by: PATHOLOGY

## 2021-05-07 PROCEDURE — 88342 CHG IMMUNOCYTOCHEMISTRY: ICD-10-PCS | Mod: 26,,, | Performed by: PATHOLOGY

## 2021-05-07 PROCEDURE — 88305 TISSUE EXAM BY PATHOLOGIST: CPT | Mod: 26,,, | Performed by: PATHOLOGY

## 2021-05-07 PROCEDURE — 88341 IMHCHEM/IMCYTCHM EA ADD ANTB: CPT | Mod: 26,,, | Performed by: PATHOLOGY

## 2021-05-07 PROCEDURE — 88341 PR IHC OR ICC EACH ADD'L SINGLE ANTIBODY  STAINPR: ICD-10-PCS | Mod: 26,,, | Performed by: PATHOLOGY

## 2021-05-07 PROCEDURE — 88305 TISSUE EXAM BY PATHOLOGIST: ICD-10-PCS | Mod: 26,,, | Performed by: PATHOLOGY

## 2021-05-07 PROCEDURE — 88342 IMHCHEM/IMCYTCHM 1ST ANTB: CPT | Mod: 26,,, | Performed by: PATHOLOGY

## 2021-05-07 PROCEDURE — 88305 TISSUE EXAM BY PATHOLOGIST: CPT | Performed by: PATHOLOGY

## 2021-05-07 PROCEDURE — 88342 IMHCHEM/IMCYTCHM 1ST ANTB: CPT | Performed by: PATHOLOGY

## 2021-05-07 PROCEDURE — 77065 MAMMO DIGITAL DIAGNOSTIC LEFT: ICD-10-PCS | Mod: 26,LT,, | Performed by: RADIOLOGY

## 2021-05-07 PROCEDURE — 19083 BX BREAST 1ST LESION US IMAG: CPT | Mod: LT,,, | Performed by: RADIOLOGY

## 2021-05-07 PROCEDURE — 27200934 US BREAST BIOPSY WITH IMAGING 1ST SITE LEFT

## 2021-05-12 LAB
FINAL PATHOLOGIC DIAGNOSIS: NORMAL
GROSS: NORMAL
Lab: NORMAL
MICROSCOPIC EXAM: NORMAL

## 2021-10-18 ENCOUNTER — HOSPITAL ENCOUNTER (OUTPATIENT)
Dept: RADIOLOGY | Facility: HOSPITAL | Age: 71
Discharge: HOME | End: 2021-10-18
Attending: INTERNAL MEDICINE
Payer: MEDICARE

## 2021-10-18 DIAGNOSIS — N18.2 CHRONIC KIDNEY DISEASE, STAGE II (MILD): ICD-10-CM

## 2021-10-18 PROCEDURE — 76770 US EXAM ABDO BACK WALL COMP: CPT | Mod: TC,PO

## 2021-12-17 ENCOUNTER — LAB VISIT (OUTPATIENT)
Dept: LAB | Facility: HOSPITAL | Age: 71
End: 2021-12-17
Attending: INTERNAL MEDICINE
Payer: MEDICARE

## 2021-12-17 DIAGNOSIS — N18.2 CHRONIC KIDNEY DISEASE, STAGE II (MILD): ICD-10-CM

## 2021-12-17 DIAGNOSIS — I10 ESSENTIAL (PRIMARY) HYPERTENSION: Primary | ICD-10-CM

## 2021-12-17 LAB
ALBUMIN SERPL BCP-MCNC: 4.2 G/DL (ref 3.5–5.2)
ALP SERPL-CCNC: 92 U/L (ref 38–126)
ALT SERPL W/O P-5'-P-CCNC: 35 U/L (ref 10–44)
ANION GAP SERPL CALC-SCNC: 9 MMOL/L (ref 8–16)
AST SERPL-CCNC: 33 U/L (ref 15–46)
BACTERIA #/AREA URNS AUTO: NORMAL /HPF
BASOPHILS # BLD AUTO: 0.02 K/UL (ref 0–0.2)
BASOPHILS NFR BLD: 0.5 % (ref 0–1.9)
BILIRUB SERPL-MCNC: 0.9 MG/DL (ref 0.1–1)
BILIRUB UR QL STRIP: NEGATIVE
CALCIUM SERPL-MCNC: 9.2 MG/DL (ref 8.7–10.5)
CHLORIDE SERPL-SCNC: 101 MMOL/L (ref 95–110)
CLARITY UR REFRACT.AUTO: CLEAR
CO2 SERPL-SCNC: 31 MMOL/L (ref 23–29)
COLOR UR AUTO: ABNORMAL
CREAT SERPL-MCNC: 1.36 MG/DL (ref 0.5–1.4)
DIFFERENTIAL METHOD: ABNORMAL
EOSINOPHIL # BLD AUTO: 0.3 K/UL (ref 0–0.5)
EOSINOPHIL NFR BLD: 7.4 % (ref 0–8)
ERYTHROCYTE [DISTWIDTH] IN BLOOD BY AUTOMATED COUNT: 11.6 % (ref 11.5–14.5)
EST. GFR  (AFRICAN AMERICAN): >60 ML/MIN/1.73 M^2
EST. GFR  (NON AFRICAN AMERICAN): 52.7 ML/MIN/1.73 M^2
GLUCOSE SERPL-MCNC: 101 MG/DL (ref 70–110)
GLUCOSE UR QL STRIP: NEGATIVE
HCT VFR BLD AUTO: 42 % (ref 40–54)
HGB BLD-MCNC: 14.1 G/DL (ref 14–18)
HGB UR QL STRIP: NEGATIVE
HYALINE CASTS UR QL AUTO: 0 /LPF
IMM GRANULOCYTES # BLD AUTO: 0.01 K/UL (ref 0–0.04)
IMM GRANULOCYTES NFR BLD AUTO: 0.2 % (ref 0–0.5)
KETONES UR QL STRIP: NEGATIVE
LEUKOCYTE ESTERASE UR QL STRIP: NEGATIVE
LYMPHOCYTES # BLD AUTO: 1.7 K/UL (ref 1–4.8)
LYMPHOCYTES NFR BLD: 41.6 % (ref 18–48)
MCH RBC QN AUTO: 30.9 PG (ref 27–31)
MCHC RBC AUTO-ENTMCNC: 33.6 G/DL (ref 32–36)
MCV RBC AUTO: 92 FL (ref 82–98)
MICROSCOPIC COMMENT: NORMAL
MONOCYTES # BLD AUTO: 0.5 K/UL (ref 0.3–1)
MONOCYTES NFR BLD: 13.1 % (ref 4–15)
NEUTROPHILS # BLD AUTO: 1.5 K/UL (ref 1.8–7.7)
NEUTROPHILS NFR BLD: 37.2 % (ref 38–73)
NITRITE UR QL STRIP: NEGATIVE
NRBC BLD-RTO: 0 /100 WBC
PH UR STRIP: 6 [PH] (ref 5–8)
PLATELET # BLD AUTO: 252 K/UL (ref 150–450)
PMV BLD AUTO: 10.5 FL (ref 9.2–12.9)
POTASSIUM SERPL-SCNC: 3.7 MMOL/L (ref 3.5–5.1)
PROT SERPL-MCNC: 7.8 G/DL (ref 6–8.4)
PROT UR QL STRIP: ABNORMAL
RBC # BLD AUTO: 4.56 M/UL (ref 4.6–6.2)
RBC #/AREA URNS AUTO: 2 /HPF (ref 0–4)
SODIUM SERPL-SCNC: 141 MMOL/L (ref 136–145)
SP GR UR STRIP: 1.01 (ref 1–1.03)
URN SPEC COLLECT METH UR: ABNORMAL
UROBILINOGEN UR STRIP-ACNC: NEGATIVE EU/DL
UUN UR-MCNC: 21 MG/DL (ref 2–20)
WBC # BLD AUTO: 4.04 K/UL (ref 3.9–12.7)
WBC #/AREA URNS AUTO: 3 /HPF (ref 0–5)

## 2021-12-17 PROCEDURE — 36415 COLL VENOUS BLD VENIPUNCTURE: CPT | Mod: PO | Performed by: INTERNAL MEDICINE

## 2021-12-17 PROCEDURE — 81000 URINALYSIS NONAUTO W/SCOPE: CPT | Mod: PO | Performed by: INTERNAL MEDICINE

## 2021-12-17 PROCEDURE — 85025 COMPLETE CBC W/AUTO DIFF WBC: CPT | Mod: PO | Performed by: INTERNAL MEDICINE

## 2021-12-17 PROCEDURE — 80053 COMPREHEN METABOLIC PANEL: CPT | Mod: PO | Performed by: INTERNAL MEDICINE

## 2022-03-30 ENCOUNTER — LAB VISIT (OUTPATIENT)
Dept: LAB | Facility: HOSPITAL | Age: 72
End: 2022-03-30
Attending: INTERNAL MEDICINE
Payer: MEDICARE

## 2022-03-30 DIAGNOSIS — N18.2 CHRONIC KIDNEY DISEASE, STAGE II (MILD): Primary | ICD-10-CM

## 2022-03-30 LAB
ALBUMIN SERPL BCP-MCNC: 4.4 G/DL (ref 3.5–5.2)
ALP SERPL-CCNC: 104 U/L (ref 38–126)
ALT SERPL W/O P-5'-P-CCNC: 40 U/L (ref 10–44)
ANION GAP SERPL CALC-SCNC: 8 MMOL/L (ref 8–16)
AST SERPL-CCNC: 34 U/L (ref 15–46)
BASOPHILS # BLD AUTO: 0.02 K/UL (ref 0–0.2)
BASOPHILS NFR BLD: 0.5 % (ref 0–1.9)
BILIRUB SERPL-MCNC: 0.7 MG/DL (ref 0.1–1)
CALCIUM SERPL-MCNC: 9.5 MG/DL (ref 8.7–10.5)
CHLORIDE SERPL-SCNC: 103 MMOL/L (ref 95–110)
CO2 SERPL-SCNC: 32 MMOL/L (ref 23–29)
CREAT SERPL-MCNC: 1.4 MG/DL (ref 0.5–1.4)
DIFFERENTIAL METHOD: ABNORMAL
EOSINOPHIL # BLD AUTO: 0.3 K/UL (ref 0–0.5)
EOSINOPHIL NFR BLD: 6.1 % (ref 0–8)
ERYTHROCYTE [DISTWIDTH] IN BLOOD BY AUTOMATED COUNT: 12.1 % (ref 11.5–14.5)
EST. GFR  (AFRICAN AMERICAN): 58.8 ML/MIN/1.73 M^2
EST. GFR  (NON AFRICAN AMERICAN): 50.9 ML/MIN/1.73 M^2
GLUCOSE SERPL-MCNC: 100 MG/DL (ref 70–110)
HCT VFR BLD AUTO: 44.2 % (ref 40–54)
HGB BLD-MCNC: 14.5 G/DL (ref 14–18)
IMM GRANULOCYTES # BLD AUTO: 0 K/UL (ref 0–0.04)
IMM GRANULOCYTES NFR BLD AUTO: 0 % (ref 0–0.5)
LYMPHOCYTES # BLD AUTO: 1.9 K/UL (ref 1–4.8)
LYMPHOCYTES NFR BLD: 42.1 % (ref 18–48)
MCH RBC QN AUTO: 30.4 PG (ref 27–31)
MCHC RBC AUTO-ENTMCNC: 32.8 G/DL (ref 32–36)
MCV RBC AUTO: 93 FL (ref 82–98)
MONOCYTES # BLD AUTO: 0.6 K/UL (ref 0.3–1)
MONOCYTES NFR BLD: 14 % (ref 4–15)
NEUTROPHILS # BLD AUTO: 1.7 K/UL (ref 1.8–7.7)
NEUTROPHILS NFR BLD: 37.3 % (ref 38–73)
NRBC BLD-RTO: 0 /100 WBC
PLATELET # BLD AUTO: 245 K/UL (ref 150–450)
PMV BLD AUTO: 10.5 FL (ref 9.2–12.9)
POTASSIUM SERPL-SCNC: 3.5 MMOL/L (ref 3.5–5.1)
PROT SERPL-MCNC: 8.3 G/DL (ref 6–8.4)
RBC # BLD AUTO: 4.77 M/UL (ref 4.6–6.2)
SODIUM SERPL-SCNC: 143 MMOL/L (ref 136–145)
UUN UR-MCNC: 24 MG/DL (ref 2–20)
WBC # BLD AUTO: 4.42 K/UL (ref 3.9–12.7)

## 2022-03-30 PROCEDURE — 36415 COLL VENOUS BLD VENIPUNCTURE: CPT | Mod: PO | Performed by: INTERNAL MEDICINE

## 2022-03-30 PROCEDURE — 85025 COMPLETE CBC W/AUTO DIFF WBC: CPT | Mod: PO | Performed by: INTERNAL MEDICINE

## 2022-03-30 PROCEDURE — 80053 COMPREHEN METABOLIC PANEL: CPT | Mod: PO | Performed by: INTERNAL MEDICINE

## 2022-08-04 ENCOUNTER — LAB VISIT (OUTPATIENT)
Dept: LAB | Facility: HOSPITAL | Age: 72
End: 2022-08-04
Attending: INTERNAL MEDICINE
Payer: MEDICARE

## 2022-08-04 DIAGNOSIS — I12.9 HYPERTENSIVE CHRONIC KIDNEY DISEASE WITH STAGE 1 THROUGH STAGE 4 CHRONIC KIDNEY DISEASE, OR UNSPECIFIED CHRONIC KIDNEY DISEASE: Primary | ICD-10-CM

## 2022-08-04 DIAGNOSIS — N18.2 CHRONIC KIDNEY DISEASE, STAGE 2 (MILD): ICD-10-CM

## 2022-08-04 LAB
ALBUMIN SERPL BCP-MCNC: 4.6 G/DL (ref 3.5–5.2)
ALP SERPL-CCNC: 97 U/L (ref 38–126)
ALT SERPL W/O P-5'-P-CCNC: 47 U/L (ref 10–44)
ANION GAP SERPL CALC-SCNC: 10 MMOL/L (ref 8–16)
AST SERPL-CCNC: 41 U/L (ref 15–46)
BASOPHILS # BLD AUTO: 0.01 K/UL (ref 0–0.2)
BASOPHILS NFR BLD: 0.2 % (ref 0–1.9)
BILIRUB SERPL-MCNC: 0.9 MG/DL (ref 0.1–1)
CALCIUM SERPL-MCNC: 9.4 MG/DL (ref 8.7–10.5)
CHLORIDE SERPL-SCNC: 104 MMOL/L (ref 95–110)
CO2 SERPL-SCNC: 29 MMOL/L (ref 23–29)
CREAT SERPL-MCNC: 1.64 MG/DL (ref 0.5–1.4)
DIFFERENTIAL METHOD: NORMAL
EOSINOPHIL # BLD AUTO: 0.3 K/UL (ref 0–0.5)
EOSINOPHIL NFR BLD: 5.2 % (ref 0–8)
ERYTHROCYTE [DISTWIDTH] IN BLOOD BY AUTOMATED COUNT: 11.9 % (ref 11.5–14.5)
EST. GFR  (NO RACE VARIABLE): 45 ML/MIN/1.73 M^2
GLUCOSE SERPL-MCNC: 109 MG/DL (ref 70–110)
HCT VFR BLD AUTO: 43.7 % (ref 40–54)
HGB BLD-MCNC: 14.8 G/DL (ref 14–18)
IMM GRANULOCYTES # BLD AUTO: 0.01 K/UL (ref 0–0.04)
IMM GRANULOCYTES NFR BLD AUTO: 0.2 % (ref 0–0.5)
LYMPHOCYTES # BLD AUTO: 2.1 K/UL (ref 1–4.8)
LYMPHOCYTES NFR BLD: 41.7 % (ref 18–48)
MCH RBC QN AUTO: 31 PG (ref 27–31)
MCHC RBC AUTO-ENTMCNC: 33.9 G/DL (ref 32–36)
MCV RBC AUTO: 92 FL (ref 82–98)
MONOCYTES # BLD AUTO: 0.7 K/UL (ref 0.3–1)
MONOCYTES NFR BLD: 14.3 % (ref 4–15)
NEUTROPHILS # BLD AUTO: 1.9 K/UL (ref 1.8–7.7)
NEUTROPHILS NFR BLD: 38.4 % (ref 38–73)
NRBC BLD-RTO: 0 /100 WBC
PLATELET # BLD AUTO: 272 K/UL (ref 150–450)
PMV BLD AUTO: 10.5 FL (ref 9.2–12.9)
POTASSIUM SERPL-SCNC: 3.3 MMOL/L (ref 3.5–5.1)
PROT SERPL-MCNC: 8.3 G/DL (ref 6–8.4)
RBC # BLD AUTO: 4.77 M/UL (ref 4.6–6.2)
SODIUM SERPL-SCNC: 143 MMOL/L (ref 136–145)
UUN UR-MCNC: 28 MG/DL (ref 2–20)
WBC # BLD AUTO: 4.96 K/UL (ref 3.9–12.7)

## 2022-08-04 PROCEDURE — 36415 COLL VENOUS BLD VENIPUNCTURE: CPT | Mod: PO | Performed by: INTERNAL MEDICINE

## 2022-08-04 PROCEDURE — 80053 COMPREHEN METABOLIC PANEL: CPT | Mod: PO | Performed by: INTERNAL MEDICINE

## 2022-08-04 PROCEDURE — 85025 COMPLETE CBC W/AUTO DIFF WBC: CPT | Mod: PO | Performed by: INTERNAL MEDICINE

## 2022-09-20 DIAGNOSIS — M54.40 LUMBAGO WITH SCIATICA: Primary | ICD-10-CM

## 2022-09-23 ENCOUNTER — HOSPITAL ENCOUNTER (OUTPATIENT)
Dept: RADIOLOGY | Facility: HOSPITAL | Age: 72
Discharge: HOME | End: 2022-09-23
Attending: INTERNAL MEDICINE
Payer: MEDICARE

## 2022-09-23 DIAGNOSIS — M54.40 LUMBAGO WITH SCIATICA: ICD-10-CM

## 2022-09-23 PROCEDURE — 72100 X-RAY EXAM L-S SPINE 2/3 VWS: CPT | Mod: TC,FY,PO

## 2022-12-31 ENCOUNTER — HOSPITAL ENCOUNTER (EMERGENCY)
Facility: HOSPITAL | Age: 72
Discharge: HOME | End: 2022-12-31
Attending: EMERGENCY MEDICINE
Payer: MEDICARE

## 2022-12-31 VITALS
TEMPERATURE: 98 F | RESPIRATION RATE: 18 BRPM | DIASTOLIC BLOOD PRESSURE: 94 MMHG | OXYGEN SATURATION: 99 % | BODY MASS INDEX: 33.47 KG/M2 | HEIGHT: 69 IN | WEIGHT: 226 LBS | HEART RATE: 67 BPM | SYSTOLIC BLOOD PRESSURE: 160 MMHG

## 2022-12-31 DIAGNOSIS — R07.9 CHEST PAIN: Primary | ICD-10-CM

## 2022-12-31 DIAGNOSIS — K21.9 GASTROESOPHAGEAL REFLUX DISEASE, UNSPECIFIED WHETHER ESOPHAGITIS PRESENT: ICD-10-CM

## 2022-12-31 LAB
ALBUMIN SERPL BCP-MCNC: 4.5 G/DL (ref 3.5–5.2)
ALP SERPL-CCNC: 111 U/L (ref 38–126)
ALT SERPL W/O P-5'-P-CCNC: 31 U/L (ref 10–44)
ANION GAP SERPL CALC-SCNC: 5 MMOL/L (ref 8–16)
AST SERPL-CCNC: 31 U/L (ref 15–46)
BASOPHILS # BLD AUTO: 0.03 K/UL (ref 0–0.2)
BASOPHILS NFR BLD: 0.7 % (ref 0–1.9)
BILIRUB SERPL-MCNC: 0.9 MG/DL (ref 0.1–1)
CALCIUM SERPL-MCNC: 9.3 MG/DL (ref 8.7–10.5)
CHLORIDE SERPL-SCNC: 105 MMOL/L (ref 95–110)
CO2 SERPL-SCNC: 29 MMOL/L (ref 23–29)
CREAT SERPL-MCNC: 1.17 MG/DL (ref 0.5–1.4)
DIFFERENTIAL METHOD: NORMAL
EOSINOPHIL # BLD AUTO: 0.2 K/UL (ref 0–0.5)
EOSINOPHIL NFR BLD: 4.1 % (ref 0–8)
ERYTHROCYTE [DISTWIDTH] IN BLOOD BY AUTOMATED COUNT: 11.6 % (ref 11.5–14.5)
EST. GFR  (NO RACE VARIABLE): >60 ML/MIN/1.73 M^2
GLUCOSE SERPL-MCNC: 111 MG/DL (ref 70–110)
HCT VFR BLD AUTO: 43.8 % (ref 40–54)
HGB BLD-MCNC: 14.8 G/DL (ref 14–18)
IMM GRANULOCYTES # BLD AUTO: 0.01 K/UL (ref 0–0.04)
IMM GRANULOCYTES NFR BLD AUTO: 0.2 % (ref 0–0.5)
LYMPHOCYTES # BLD AUTO: 2 K/UL (ref 1–4.8)
LYMPHOCYTES NFR BLD: 43 % (ref 18–48)
MCH RBC QN AUTO: 30.4 PG (ref 27–31)
MCHC RBC AUTO-ENTMCNC: 33.8 G/DL (ref 32–36)
MCV RBC AUTO: 90 FL (ref 82–98)
MONOCYTES # BLD AUTO: 0.5 K/UL (ref 0.3–1)
MONOCYTES NFR BLD: 10.4 % (ref 4–15)
NEUTROPHILS # BLD AUTO: 1.9 K/UL (ref 1.8–7.7)
NEUTROPHILS NFR BLD: 41.6 % (ref 38–73)
NRBC BLD-RTO: 0 /100 WBC
NT-PROBNP SERPL-MCNC: 37 PG/ML (ref 5–900)
PLATELET # BLD AUTO: 273 K/UL (ref 150–450)
PMV BLD AUTO: 10.3 FL (ref 9.2–12.9)
POTASSIUM SERPL-SCNC: 3.5 MMOL/L (ref 3.5–5.1)
PROT SERPL-MCNC: 7.9 G/DL (ref 6–8.4)
RBC # BLD AUTO: 4.87 M/UL (ref 4.6–6.2)
SARS-COV-2 RDRP RESP QL NAA+PROBE: NEGATIVE
SODIUM SERPL-SCNC: 139 MMOL/L (ref 136–145)
TROPONIN I SERPL-MCNC: <0.012 NG/ML (ref 0.01–0.03)
TROPONIN I SERPL-MCNC: <0.012 NG/ML (ref 0.01–0.03)
UUN UR-MCNC: 21 MG/DL (ref 2–20)
WBC # BLD AUTO: 4.6 K/UL (ref 3.9–12.7)

## 2022-12-31 PROCEDURE — 25000003 PHARM REV CODE 250: Mod: ER | Performed by: EMERGENCY MEDICINE

## 2022-12-31 PROCEDURE — 80053 COMPREHEN METABOLIC PANEL: CPT | Mod: ER | Performed by: EMERGENCY MEDICINE

## 2022-12-31 PROCEDURE — 99285 EMERGENCY DEPT VISIT HI MDM: CPT | Mod: 25,ER

## 2022-12-31 PROCEDURE — U0002 COVID-19 LAB TEST NON-CDC: HCPCS | Mod: ER | Performed by: EMERGENCY MEDICINE

## 2022-12-31 PROCEDURE — 93010 ELECTROCARDIOGRAM REPORT: CPT | Mod: ,,, | Performed by: INTERNAL MEDICINE

## 2022-12-31 PROCEDURE — 99900035 HC TECH TIME PER 15 MIN (STAT): Mod: ER

## 2022-12-31 PROCEDURE — 83880 ASSAY OF NATRIURETIC PEPTIDE: CPT | Mod: ER | Performed by: EMERGENCY MEDICINE

## 2022-12-31 PROCEDURE — 93005 ELECTROCARDIOGRAM TRACING: CPT | Mod: ER

## 2022-12-31 PROCEDURE — 85025 COMPLETE CBC W/AUTO DIFF WBC: CPT | Mod: ER | Performed by: EMERGENCY MEDICINE

## 2022-12-31 PROCEDURE — 93010 EKG 12-LEAD: ICD-10-PCS | Mod: ,,, | Performed by: INTERNAL MEDICINE

## 2022-12-31 PROCEDURE — 84484 ASSAY OF TROPONIN QUANT: CPT | Mod: ER | Performed by: EMERGENCY MEDICINE

## 2022-12-31 RX ORDER — MAG HYDROX/ALUMINUM HYD/SIMETH 200-200-20
30 SUSPENSION, ORAL (FINAL DOSE FORM) ORAL ONCE
Status: COMPLETED | OUTPATIENT
Start: 2022-12-31 | End: 2022-12-31

## 2022-12-31 RX ORDER — TRAMADOL HYDROCHLORIDE 50 MG/1
50 TABLET ORAL 2 TIMES DAILY PRN
Status: ON HOLD | COMMUNITY
Start: 2022-09-28 | End: 2023-09-15 | Stop reason: HOSPADM

## 2022-12-31 RX ORDER — LIDOCAINE HYDROCHLORIDE 20 MG/ML
15 SOLUTION OROPHARYNGEAL ONCE
Status: COMPLETED | OUTPATIENT
Start: 2022-12-31 | End: 2022-12-31

## 2022-12-31 RX ORDER — PANTOPRAZOLE SODIUM 40 MG/1
40 TABLET, DELAYED RELEASE ORAL 2 TIMES DAILY
COMMUNITY
Start: 2022-12-19 | End: 2023-09-27

## 2022-12-31 RX ORDER — LOSARTAN POTASSIUM 100 MG/1
100 TABLET ORAL DAILY
Status: ON HOLD | COMMUNITY
Start: 2022-11-26 | End: 2023-09-15 | Stop reason: SDUPTHER

## 2022-12-31 RX ORDER — HYDROCHLOROTHIAZIDE 25 MG/1
25 TABLET ORAL DAILY
Status: ON HOLD | COMMUNITY
Start: 2022-11-26 | End: 2023-09-15 | Stop reason: SDUPTHER

## 2022-12-31 RX ADMIN — ALUMINUM HYDROXIDE, MAGNESIUM HYDROXIDE, AND SIMETHICONE 30 ML: 200; 200; 20 SUSPENSION ORAL at 10:12

## 2022-12-31 RX ADMIN — LIDOCAINE HYDROCHLORIDE 15 ML: 20 SOLUTION ORAL; TOPICAL at 10:12

## 2022-12-31 NOTE — ED PROVIDER NOTES
Encounter Date: 12/31/2022       History     Chief Complaint   Patient presents with    Chest Pain    Back Pain     I am having a chest pressure since last night that goes into the left side my neck. I also have left lower back pain.      Patient is a 72-year-old male with a past history of hypertension, GERD who presents to the ED with complaint of chest pain.  Patient reports several days of intermittent chest pain described as pressure not relieved with his PPI.  He describes as pressure with occasional radiation to his shoulder.  He does report 1 episode of tingling to the thumb and forefinger yesterday but denies any overt diaphoresis, numbness or tingling to either upper extremity, shortness of breath.  He denies any radiation of pain to his back or so-called ripping or tearing back pain associated with his chest pain.  He denies any significant cardiac history when explicitly questioned.  The patient has not taken anything for his chest pain other than the aforementioned PPI.  He denies any nausea or vomiting, headache, vision change, palpitations, lightheadedness or dizziness.    Review of patient's allergies indicates:  No Known Allergies  Past Medical History:   Diagnosis Date    Hypertension     Prostate cancer      Past Surgical History:   Procedure Laterality Date    APPENDECTOMY      COLONOSCOPY N/A 5/15/2018    Procedure: COLONOSCOPY;  Surgeon: Edison Amado Jr., MD;  Location: [facility];  Service: Endoscopy;  Laterality: N/A;    PROSTATE SURGERY       Family History   Problem Relation Age of Onset    No Known Problems Mother     No Known Problems Father     No Known Problems Sister      Social History     Tobacco Use    Smoking status: Former    Smokeless tobacco: Never   Substance Use Topics    Alcohol use: No    Drug use: No     Review of Systems   Constitutional:  Negative for chills and fever.   Respiratory:  Positive for chest tightness. Negative for shortness of breath.    Cardiovascular:   Positive for chest pain. Negative for palpitations and leg swelling.   Gastrointestinal:  Negative for abdominal pain, nausea and vomiting.   Musculoskeletal:  Positive for back pain (Lower back pain; chronic).   Skin:  Negative for pallor and rash.   Neurological:  Negative for dizziness, light-headedness, numbness and headaches.   Psychiatric/Behavioral:  Negative for agitation and confusion.      Physical Exam     Initial Vitals [12/31/22 0841]   BP Pulse Resp Temp SpO2   (!) 172/92 74 19 98.1 °F (36.7 °C) 100 %      MAP       --         Physical Exam    Nursing note and vitals reviewed.  Constitutional: He appears well-developed and well-nourished.   HENT:   Head: Normocephalic and atraumatic.   Right Ear: External ear normal.   Left Ear: External ear normal.   Eyes: EOM are normal. Pupils are equal, round, and reactive to light.   Neck: Neck supple.   Normal range of motion.  Cardiovascular:  Normal rate, regular rhythm, normal heart sounds and intact distal pulses.           Carotid pulse 2 +bilaterally; radial pulse 2 +bilaterally   Pulmonary/Chest: Breath sounds normal.   Abdominal: Abdomen is soft. Bowel sounds are normal.   Musculoskeletal:         General: Normal range of motion.      Cervical back: Normal range of motion and neck supple.     Neurological: He is alert and oriented to person, place, and time. He has normal strength.   No focal neurological deficits; strength 5/5 in all 4 extremities; sensation grossly intact   Skin: Skin is warm. Capillary refill takes less than 2 seconds.   Psychiatric: He has a normal mood and affect.       ED Course   Procedures  Labs Reviewed   COMPREHENSIVE METABOLIC PANEL - Abnormal; Notable for the following components:       Result Value    Glucose 111 (*)     BUN 21 (*)     Anion Gap 5 (*)     All other components within normal limits   CBC W/ AUTO DIFFERENTIAL   TROPONIN I   NT-PRO NATRIURETIC PEPTIDE   SARS-COV-2 RNA AMPLIFICATION, QUAL    Narrative:     Is the  patient symptomatic?->Yes   TROPONIN I     EKG Readings: (Independently Interpreted)   Initial Reading: No STEMI. Rhythm: Normal Sinus Rhythm. Heart Rate: 74. Ectopy: No Ectopy. Conduction: Normal.   NSR; no ischemic change; no STEMI      Imaging Results              X-Ray Chest PA And Lateral (Final result)  Result time 12/31/22 10:02:11      Final result by Chapito Trivedi MD (12/31/22 10:02:11)                   Impression:      Negative chest radiograph.      Electronically signed by: Chapito Trivedi MD  Date:    12/31/2022  Time:    10:02               Narrative:    EXAMINATION:  XR CHEST PA AND LATERAL    CLINICAL HISTORY:  Chest pain, unspecified    COMPARISON:  Chest x-ray, 09/18/2018    FINDINGS:  The lungs are clear. The cardiac silhouette is within normal limits. No pleural effusion or pneumothorax. The bones are intact.                                       Medications   aluminum-magnesium hydroxide-simethicone 200-200-20 mg/5 mL suspension 30 mL (30 mLs Oral Given 12/31/22 1004)     And   LIDOcaine HCl 2% oral solution 15 mL (15 mLs Oral Given 12/31/22 1004)     Medical Decision Making:   Initial Assessment:   72-year-old male presents with several days of chest pressure; he denies any diaphoresis, SOB; he denies any significant cardiac hx   Differential Diagnosis:   STEMI, NSTEMI, GERD, ACS, angina, aortic dissection, community-acquired pneumonia, musculoskeletal pain  Clinical Tests:   Lab Tests: Reviewed and Ordered  Radiological Study: Ordered and Reviewed  ED Management:  - patient's vital signs within normal limits; exam unremarkable; patient was administered a GI cocktail with patient reporting improvement of his symptoms; cardiac workup, including 2 troponins, ordered by self, were within normal limits; EKG without findings of ischemic change or ST elevation myocardial infarction; chest x-ray demonstrates no acute cardiopulmonary process per my interpretation; this was confirmed by the final  radiology read.  Remainder of labs including CBC, CMP, BNP within normal limits  - Patient presentation is low risk for ACS, there is no indication of any acute cardiac event on EKG, chest x-ray is non-concerning for an acute cardiopulmonary process, the patient has no risk factors for PE, and the pt's workup is benign.The pt's second troponin is negative. At this point, after taking into consideration the patient's presenting complaints, emergency department work-up and risk stratification score, I believe that there is no need for emergent hospitalization at this time. I discussed with the pt their risk stratification for chest pain and the need for follow up with cardiology for further testing and likely stress test within 72 hours. The pt understands. I will discharge with symptom control and have them follow up with primary care physician and cardiology for further workup of their pain. The patient is comfortable with this plan of going home this time. All questions answered to the patient's satisfaction prior to discharge.   - No further intervention is indicated at this time after having taken into account the patient's history, physical exam findings, and empirical and objective data obtained during the patient's emergency department workup.   - The patient is at low risk for an emergent medical condition at this time, and I am of the belief that that it is safe to discharge the patient from the emergency department.   - The patient is instructed to follow up as outpatient as indicated on the discharge paperwork.    - I have discussed the specifics of the workup with the patient and the patient has verbalized understanding of the details of the workup, the diagnosis, the treatment plan, and the need for outpatient follow-up.    - Although the patient has no emergent etiology today this does not preclude the development of an emergent condition so, in addition, I have advised the patient that they can return  to the ED and/or activate EMS at any time with worsening of their symptoms, change of their symptoms, or with any other medical complaint.    - The patient remained comfortable and stable during their visit in the ED.    - Discharge and follow-up instructions discussed with the patient who expressed understanding and willingness to comply with my recommendations.  - Results of all emergency department tests  discussed thoroughly with patient; all patient questions answered; pt in agreement with plan  - Pt instructed to follow up with PCP in 2-3 days for recheck of today's complaints  - Pt given strict emergency department return precautions for any new or worsening of symptoms  - Pt discharged from the emergency department in stable condition, in no acute distress     Additional MDM:   Heart Score:    History:          Slightly suspicious.  ECG:             Normal  Age:               >65 years  Risk factors: 1-2 risk factors  Troponin:       Less than or equal to normal limit  Final Score: 3          ED Course as of 12/31/22 1754   Sat Dec 31, 2022   1227 Patient reports improvement of his chest pressure/pain with administration of GI cocktail. [LC]   1753 Troponin I: <0.012 [LC]   1753 SARS-CoV-2 RNA, Amplification, Qual: Negative [LC]   1753 Sodium: 139 [LC]   1753 BUN(!): 21 [LC]   1753 Creatinine: 1.17 [LC]   1753 Hemoglobin: 14.8 [LC]   1753 Hematocrit: 43.8 [LC]   1753 NT-proBNP: 37 [LC]   1753 X-Ray Chest PA And Lateral  CXR reviewed by self - no acute cardiopulmonary process per my interpretation; this was confirmed by the final radiology read.  [LC]      ED Course User Index  [LC] Jhony Larson MD                 Clinical Impression:   Final diagnoses:  [R07.9] Chest pain (Primary)  [K21.9] Gastroesophageal reflux disease, unspecified whether esophagitis present        ED Disposition Condition    Discharge Stable          ED Prescriptions    None       Follow-up Information       Follow up With  Specialties Details Why Contact Info    Karen Aragon MD Internal Medicine Schedule an appointment as soon as possible for a visit   [address]  [address]  [place] [address]  [phone]               Jhony Larson MD  01/01/23 0717

## 2022-12-31 NOTE — ED NOTES
Pt updated on plan of care for second troponin draw at 1150. Pt and wife verbalize understanding and have no questions at this time.

## 2022-12-31 NOTE — ED NOTES
Troponin redrawn and sent to lab per order. Pt with no complaints at this time and denies any needs

## 2023-01-11 DIAGNOSIS — I10 ESSENTIAL HYPERTENSION: ICD-10-CM

## 2023-01-11 DIAGNOSIS — E78.2 MIXED HYPERLIPIDEMIA: ICD-10-CM

## 2023-01-11 DIAGNOSIS — R06.09 DYSPNEA ON EXERTION: Primary | ICD-10-CM

## 2023-01-18 ENCOUNTER — HOSPITAL ENCOUNTER (OUTPATIENT)
Dept: CARDIOLOGY | Facility: HOSPITAL | Age: 73
Discharge: HOME | End: 2023-01-18
Attending: INTERNAL MEDICINE
Payer: MEDICARE

## 2023-01-18 VITALS — WEIGHT: 226 LBS | HEIGHT: 69 IN | BODY MASS INDEX: 33.47 KG/M2

## 2023-01-18 DIAGNOSIS — I10 ESSENTIAL HYPERTENSION: ICD-10-CM

## 2023-01-18 DIAGNOSIS — E78.2 MIXED HYPERLIPIDEMIA: ICD-10-CM

## 2023-01-18 DIAGNOSIS — R06.09 DYSPNEA ON EXERTION: ICD-10-CM

## 2023-01-18 LAB
AV INDEX (PROSTH): 0.76
AV MEAN GRADIENT: 2 MMHG
AV PEAK GRADIENT: 4 MMHG
AV VALVE AREA: 3.28 CM2
AV VELOCITY RATIO: 0.74
BSA FOR ECHO PROCEDURE: 2.23 M2
CV ECHO LV RWT: 0.16 CM
DOP CALC AO PEAK VEL: 1.05 M/S
DOP CALC AO VTI: 20.3 CM
DOP CALC LVOT AREA: 4.3 CM2
DOP CALC LVOT DIAMETER: 2.34 CM
DOP CALC LVOT PEAK VEL: 0.78 M/S
DOP CALC LVOT STROKE VOLUME: 66.62 CM3
DOP CALC MV VTI: 19.6 CM
DOP CALCLVOT PEAK VEL VTI: 15.5 CM
E WAVE DECELERATION TIME: 195.55 MSEC
E/A RATIO: 0.72
E/E' RATIO: 6.71 M/S
ECHO LV POSTERIOR WALL: 0.43 CM (ref 0.6–1.1)
EJECTION FRACTION: 50 %
FRACTIONAL SHORTENING: 35 % (ref 28–44)
INTERVENTRICULAR SEPTUM: 1.04 CM (ref 0.6–1.1)
IVC DIAMETER: 1.95 CM
IVRT: 115.34 MSEC
LA MAJOR: 4.2 CM
LA MINOR: 4.43 CM
LA WIDTH: 3.4 CM
LEFT ATRIUM SIZE: 3.58 CM
LEFT ATRIUM VOLUME INDEX: 20.5 ML/M2
LEFT ATRIUM VOLUME: 44.61 CM3
LEFT INTERNAL DIMENSION IN SYSTOLE: 3.52 CM (ref 2.1–4)
LEFT VENTRICLE DIASTOLIC VOLUME INDEX: 65.27 ML/M2
LEFT VENTRICLE DIASTOLIC VOLUME: 142.28 ML
LEFT VENTRICLE MASS INDEX: 64 G/M2
LEFT VENTRICLE SYSTOLIC VOLUME INDEX: 23.7 ML/M2
LEFT VENTRICLE SYSTOLIC VOLUME: 51.69 ML
LEFT VENTRICULAR INTERNAL DIMENSION IN DIASTOLE: 5.42 CM (ref 3.5–6)
LEFT VENTRICULAR MASS: 140.26 G
LV LATERAL E/E' RATIO: 5.18 M/S
LV SEPTAL E/E' RATIO: 9.5 M/S
LVOT MG: 1.29 MMHG
LVOT MV: 0.54 CM/S
MV A" WAVE DURATION": 96.89 MSEC
MV MEAN GRADIENT: 1 MMHG
MV PEAK A VEL: 0.79 M/S
MV PEAK E VEL: 0.57 M/S
MV PEAK GRADIENT: 2 MMHG
MV STENOSIS PRESSURE HALF TIME: 56.71 MS
MV VALVE AREA BY CONTINUITY EQUATION: 3.4 CM2
MV VALVE AREA P 1/2 METHOD: 3.88 CM2
PISA TR MAX VEL: 1.95 M/S
PULM VEIN S/D RATIO: 1.2
PV PEAK D VEL: 0.44 M/S
PV PEAK S VEL: 0.53 M/S
PV PEAK VELOCITY: 0.75 CM/S
RA MAJOR: 4.48 CM
RA PRESSURE: 3 MMHG
RA WIDTH: 2.4 CM
TDI LATERAL: 0.11 M/S
TDI SEPTAL: 0.06 M/S
TDI: 0.09 M/S
TR MAX PG: 15 MMHG
TV REST PULMONARY ARTERY PRESSURE: 18 MMHG

## 2023-01-18 PROCEDURE — 93306 TTE W/DOPPLER COMPLETE: CPT | Mod: PO

## 2023-01-18 PROCEDURE — 93306 TTE W/DOPPLER COMPLETE: CPT | Mod: 26,,, | Performed by: INTERNAL MEDICINE

## 2023-01-18 PROCEDURE — 93306 ECHO (CUPID ONLY): ICD-10-PCS | Mod: 26,,, | Performed by: INTERNAL MEDICINE

## 2023-02-08 ENCOUNTER — OFFICE VISIT (OUTPATIENT)
Dept: CARDIOLOGY | Facility: CLINIC | Age: 73
End: 2023-02-08
Payer: MEDICARE

## 2023-02-08 VITALS
DIASTOLIC BLOOD PRESSURE: 82 MMHG | HEIGHT: 70 IN | BODY MASS INDEX: 33.64 KG/M2 | SYSTOLIC BLOOD PRESSURE: 142 MMHG | HEART RATE: 70 BPM | WEIGHT: 235 LBS

## 2023-02-08 DIAGNOSIS — I10 ESSENTIAL HYPERTENSION: Primary | ICD-10-CM

## 2023-02-08 DIAGNOSIS — E66.09 CLASS 1 OBESITY DUE TO EXCESS CALORIES WITHOUT SERIOUS COMORBIDITY WITH BODY MASS INDEX (BMI) OF 33.0 TO 33.9 IN ADULT: ICD-10-CM

## 2023-02-08 DIAGNOSIS — Z83.3 FAMILY HISTORY OF DIABETES MELLITUS (DM): ICD-10-CM

## 2023-02-08 DIAGNOSIS — E78.2 MIXED HYPERLIPIDEMIA: ICD-10-CM

## 2023-02-08 PROBLEM — E66.811 CLASS 1 OBESITY DUE TO EXCESS CALORIES WITHOUT SERIOUS COMORBIDITY IN ADULT: Status: ACTIVE | Noted: 2023-02-08

## 2023-02-08 PROCEDURE — 3079F PR MOST RECENT DIASTOLIC BLOOD PRESSURE 80-89 MM HG: ICD-10-PCS | Mod: CPTII,S$GLB,, | Performed by: INTERNAL MEDICINE

## 2023-02-08 PROCEDURE — 3077F PR MOST RECENT SYSTOLIC BLOOD PRESSURE >= 140 MM HG: ICD-10-PCS | Mod: CPTII,S$GLB,, | Performed by: INTERNAL MEDICINE

## 2023-02-08 PROCEDURE — 3288F PR FALLS RISK ASSESSMENT DOCUMENTED: ICD-10-PCS | Mod: CPTII,S$GLB,, | Performed by: INTERNAL MEDICINE

## 2023-02-08 PROCEDURE — 99205 OFFICE O/P NEW HI 60 MIN: CPT | Mod: S$GLB,,, | Performed by: INTERNAL MEDICINE

## 2023-02-08 PROCEDURE — 1126F PR PAIN SEVERITY QUANTIFIED, NO PAIN PRESENT: ICD-10-PCS | Mod: CPTII,S$GLB,, | Performed by: INTERNAL MEDICINE

## 2023-02-08 PROCEDURE — 1159F PR MEDICATION LIST DOCUMENTED IN MEDICAL RECORD: ICD-10-PCS | Mod: CPTII,S$GLB,, | Performed by: INTERNAL MEDICINE

## 2023-02-08 PROCEDURE — 3008F PR BODY MASS INDEX (BMI) DOCUMENTED: ICD-10-PCS | Mod: CPTII,S$GLB,, | Performed by: INTERNAL MEDICINE

## 2023-02-08 PROCEDURE — 3288F FALL RISK ASSESSMENT DOCD: CPT | Mod: CPTII,S$GLB,, | Performed by: INTERNAL MEDICINE

## 2023-02-08 PROCEDURE — 3008F BODY MASS INDEX DOCD: CPT | Mod: CPTII,S$GLB,, | Performed by: INTERNAL MEDICINE

## 2023-02-08 PROCEDURE — 1101F PR PT FALLS ASSESS DOC 0-1 FALLS W/OUT INJ PAST YR: ICD-10-PCS | Mod: CPTII,S$GLB,, | Performed by: INTERNAL MEDICINE

## 2023-02-08 PROCEDURE — 99205 PR OFFICE/OUTPT VISIT, NEW, LEVL V, 60-74 MIN: ICD-10-PCS | Mod: S$GLB,,, | Performed by: INTERNAL MEDICINE

## 2023-02-08 PROCEDURE — 1101F PT FALLS ASSESS-DOCD LE1/YR: CPT | Mod: CPTII,S$GLB,, | Performed by: INTERNAL MEDICINE

## 2023-02-08 PROCEDURE — 1159F MED LIST DOCD IN RCRD: CPT | Mod: CPTII,S$GLB,, | Performed by: INTERNAL MEDICINE

## 2023-02-08 PROCEDURE — 3077F SYST BP >= 140 MM HG: CPT | Mod: CPTII,S$GLB,, | Performed by: INTERNAL MEDICINE

## 2023-02-08 PROCEDURE — 99999 PR PBB SHADOW E&M-EST. PATIENT-LVL III: ICD-10-PCS | Mod: PBBFAC,,, | Performed by: INTERNAL MEDICINE

## 2023-02-08 PROCEDURE — 3079F DIAST BP 80-89 MM HG: CPT | Mod: CPTII,S$GLB,, | Performed by: INTERNAL MEDICINE

## 2023-02-08 PROCEDURE — 1126F AMNT PAIN NOTED NONE PRSNT: CPT | Mod: CPTII,S$GLB,, | Performed by: INTERNAL MEDICINE

## 2023-02-08 PROCEDURE — 99999 PR PBB SHADOW E&M-EST. PATIENT-LVL III: CPT | Mod: PBBFAC,,, | Performed by: INTERNAL MEDICINE

## 2023-02-08 NOTE — PROGRESS NOTES
Subjective:   Patient ID:  Luis Mendez is a 72 y.o. male who presents for evaluation and treatment of Hypertension, Hyperlipidemia, and Obesity      HPI:     Last visit 3/2019      He is here by recommendation of Dr. Aragon for management of malignant HTN. No end organ damage. No LVH. No CVA/TIA. No CHF.. Mildly elevated cr with gfr > 60. No DM. No tobacco. He recently changed his diet and started exercising. Medications were adjusted after last visit. He is taking losartan 100, hctz 25, coreg 25 bid, aldactone 25 mg, and norvasc 10 mg daily.  mmHg. He is no longer taking clonidine.  He went to the emergency room December 31, 2022 for atypical chest discomfort.  His workup was overall unremarkable with normal cardiac biomarkers and EKG.  He is very active without any limitations.      ECG 9/2018: NSR      Echo 8/2018:   Normal EF   Normal RV function    No valve disease     Nuclear stress test 8/2018   Non ischemic      Echo 1/2023    Summary    The left ventricle is normal in size with low normal systolic function.  The estimated ejection fraction is 50-55%.  Normal right ventricular size with normal right ventricular systolic function.  Normal left ventricular diastolic function.  The estimated PA systolic pressure is 18 mmHg.  Normal central venous pressure (3 mmHg).  Trivial pericardial effusion.         The 10-year ASCVD risk score (Kellee DK, et al., 2019) is: 21.3%    Values used to calculate the score:      Age: 72 years      Sex: Male      Is Non- : Yes      Diabetic: No      Tobacco smoker: No      Systolic Blood Pressure: 142 mmHg      Is BP treated: Yes      HDL Cholesterol: 42 mg/dL      Total Cholesterol: 132 mg/dL      Patient Active Problem List    Diagnosis Date Noted    Class 1 obesity due to excess calories without serious comorbidity in adult 02/08/2023    Prostate cancer     Dyspnea on exertion 03/06/2019    Essential hypertension 11/07/2018    Mixed  hyperlipidemia 2018    Family history of diabetes mellitus (DM) 2018    Screening for colorectal cancer 05/15/2018           Right Arm BP - Sittin/82  Left Arm BP - Sittin/82        LABS    Lipid panel  Lab Results   Component Value Date    CHOL 132 2023    CHOL 120 10/18/2021    CHOL 131 2021     Lab Results   Component Value Date    HDL 42 2023    HDL 40 10/18/2021    HDL 43 2021     Lab Results   Component Value Date    LDLCALC 79.4 2023    LDLCALC 67.8 10/18/2021    LDLCALC 74.8 2021     Lab Results   Component Value Date    TRIG 53 2023    TRIG 61 10/18/2021    TRIG 66 2021     Lab Results   Component Value Date    CHOLHDL 31.8 2023    CHOLHDL 33.3 10/18/2021    CHOLHDL 32.8 2021            Review of Systems   Constitutional: Negative for diaphoresis, night sweats, weight gain and weight loss.   HENT:  Negative for congestion.    Eyes:  Negative for blurred vision, discharge and double vision.   Cardiovascular:  Negative for chest pain, claudication, cyanosis, dyspnea on exertion, irregular heartbeat, leg swelling, near-syncope, orthopnea, palpitations, paroxysmal nocturnal dyspnea and syncope.   Respiratory:  Negative for cough, shortness of breath and wheezing.    Endocrine: Negative for cold intolerance, heat intolerance and polyphagia.   Hematologic/Lymphatic: Negative for adenopathy and bleeding problem. Does not bruise/bleed easily.   Skin:  Negative for dry skin and nail changes.   Musculoskeletal:  Negative for arthritis, back pain, falls, joint pain, myalgias and neck pain.   Gastrointestinal:  Negative for bloating, abdominal pain, change in bowel habit and constipation.   Genitourinary:  Negative for bladder incontinence, dysuria, flank pain, genital sores and missed menses.   Neurological:  Negative for aphonia, brief paralysis, difficulty with concentration, dizziness and weakness.   Psychiatric/Behavioral:   Negative for altered mental status and memory loss. The patient does not have insomnia.    Allergic/Immunologic: Negative for environmental allergies.     Objective:   Physical Exam  Constitutional:       Appearance: He is well-developed.      Interventions: He is not intubated.  HENT:      Head: Normocephalic and atraumatic.      Right Ear: External ear normal.      Left Ear: External ear normal.   Eyes:      General: No scleral icterus.        Right eye: No discharge.         Left eye: No discharge.      Conjunctiva/sclera: Conjunctivae normal.      Pupils: Pupils are equal, round, and reactive to light.   Neck:      Thyroid: No thyromegaly.      Vascular: Normal carotid pulses. No carotid bruit, hepatojugular reflux or JVD.      Trachea: No tracheal deviation.   Cardiovascular:      Rate and Rhythm: Normal rate and regular rhythm. No extrasystoles are present.     Chest Wall: PMI is not displaced.      Pulses:           Carotid pulses are 2+ on the right side and 2+ on the left side.       Radial pulses are 2+ on the right side and 2+ on the left side.        Femoral pulses are 2+ on the right side and 2+ on the left side.       Popliteal pulses are 2+ on the right side and 2+ on the left side.        Dorsalis pedis pulses are 2+ on the right side and 2+ on the left side.        Posterior tibial pulses are 2+ on the right side and 2+ on the left side.      Heart sounds: S1 normal and S2 normal. Heart sounds not distant. No midsystolic click. No murmur heard.    No friction rub. No gallop. No S3 sounds.   Pulmonary:      Effort: Pulmonary effort is normal. No tachypnea, bradypnea, accessory muscle usage or respiratory distress. He is not intubated.      Breath sounds: Normal breath sounds. No stridor. No decreased breath sounds, wheezing or rales.   Chest:      Chest wall: No tenderness.   Abdominal:      General: There is no distension or abdominal bruit.      Palpations: There is no mass or pulsatile mass.       Tenderness: There is no abdominal tenderness. There is no guarding or rebound.   Musculoskeletal:         General: No tenderness. Normal range of motion.      Cervical back: Normal range of motion and neck supple.   Lymphadenopathy:      Cervical: No cervical adenopathy.   Skin:     General: Skin is warm.      Coloration: Skin is not pale.      Findings: No erythema or rash.   Neurological:      Mental Status: He is alert and oriented to person, place, and time.      Cranial Nerves: No cranial nerve deficit.      Coordination: Coordination normal.      Deep Tendon Reflexes: Reflexes are normal and symmetric.   Psychiatric:         Behavior: Behavior normal.         Thought Content: Thought content normal.         Judgment: Judgment normal.       Assessment:     1. Essential hypertension    2. Mixed hyperlipidemia    3. Family history of diabetes mellitus (DM)    4. Class 1 obesity due to excess calories without serious comorbidity with body mass index (BMI) of 33.0 to 33.9 in adult        Plan:         Enroll in digital HTN clinic  Target BP < 130/80 mmHg  Low salt diet  Weight loss  Exercise  Aspirin, statin, and arb.        Continue with current medical plan and lifestyle changes.  Return sooner for concerns or questions. If symptoms persist go to the ED  I have reviewed all pertinent data on this patient           Follow up as scheduled. Return sooner for concerns or questions            He expressed verbal understanding and agreed with the plan    Greater than 50% of the visit of 45 minutes was spent counseling, educating, and coordinating the care of the patient.        I have reviewed the patient's medical history in detail and updated the computerized patient record.    Orders Placed This Encounter   Procedures    CBC Auto Differential     Standing Status:   Future     Standing Expiration Date:   8/8/2024    Comprehensive Metabolic Panel     Standing Status:   Future     Standing Expiration Date:   8/8/2024     Lipid Panel     Standing Status:   Future     Standing Expiration Date:   8/8/2024       Follow up as scheduled. Return sooner for concerns or questions              Patient's Medications   New Prescriptions    No medications on file   Previous Medications    AMLODIPINE (NORVASC) 10 MG TABLET    Take 1 tablet (10 mg total) by mouth once daily.    ASPIRIN (ECOTRIN) 81 MG EC TABLET    Take 1 tablet (81 mg total) by mouth once daily.    ATORVASTATIN (LIPITOR) 40 MG TABLET    Take 40 mg by mouth once daily.    CARVEDILOL (COREG) 25 MG TABLET    TAKE 1 TABLET BY MOUTH TWICE DAILY    HYDROCHLOROTHIAZIDE (HYDRODIURIL) 25 MG TABLET    Take 25 mg by mouth once daily.    LOSARTAN (COZAAR) 100 MG TABLET    Take 100 mg by mouth once daily.    PANTOPRAZOLE (PROTONIX) 40 MG TABLET    Take 40 mg by mouth 2 (two) times daily.    SPIRONOLACTONE (ALDACTONE) 25 MG TABLET    Take 1 tablet (25 mg total) by mouth once daily.    TRAMADOL (ULTRAM) 50 MG TABLET    Take 50 mg by mouth 2 (two) times daily as needed.   Modified Medications    No medications on file   Discontinued Medications

## 2023-09-13 ENCOUNTER — HOSPITAL ENCOUNTER (INPATIENT)
Facility: HOSPITAL | Age: 73
LOS: 1 days | Discharge: HOME | DRG: 247 | End: 2023-09-15
Attending: STUDENT IN AN ORGANIZED HEALTH CARE EDUCATION/TRAINING PROGRAM | Admitting: INTERNAL MEDICINE
Payer: MEDICARE

## 2023-09-13 DIAGNOSIS — R06.02 SHORTNESS OF BREATH: ICD-10-CM

## 2023-09-13 DIAGNOSIS — I21.11 ST ELEVATION MYOCARDIAL INFARCTION INVOLVING RIGHT CORONARY ARTERY: Primary | ICD-10-CM

## 2023-09-13 DIAGNOSIS — I21.11 STEMI INVOLVING RIGHT CORONARY ARTERY: ICD-10-CM

## 2023-09-13 DIAGNOSIS — I10 PRIMARY HYPERTENSION: ICD-10-CM

## 2023-09-13 DIAGNOSIS — I21.19 ACUTE ST ELEVATION MYOCARDIAL INFARCTION (STEMI) OF INFERIOR WALL: ICD-10-CM

## 2023-09-13 DIAGNOSIS — R07.9 CHEST PAIN: ICD-10-CM

## 2023-09-13 DIAGNOSIS — E87.6 HYPOKALEMIA: ICD-10-CM

## 2023-09-13 DIAGNOSIS — I21.3 STEMI (ST ELEVATION MYOCARDIAL INFARCTION): ICD-10-CM

## 2023-09-13 LAB
ALBUMIN SERPL BCP-MCNC: 4.5 G/DL (ref 3.5–5.2)
ALP SERPL-CCNC: 111 U/L (ref 38–126)
ALT SERPL W/O P-5'-P-CCNC: 49 U/L (ref 10–44)
ANION GAP SERPL CALC-SCNC: 9 MMOL/L (ref 8–16)
AST SERPL-CCNC: 40 U/L (ref 15–46)
BASOPHILS # BLD AUTO: 0.03 K/UL (ref 0–0.2)
BASOPHILS NFR BLD: 0.6 % (ref 0–1.9)
BILIRUB SERPL-MCNC: 0.7 MG/DL (ref 0.1–1)
CALCIUM SERPL-MCNC: 9.5 MG/DL (ref 8.7–10.5)
CHLORIDE SERPL-SCNC: 103 MMOL/L (ref 95–110)
CO2 SERPL-SCNC: 27 MMOL/L (ref 23–29)
CREAT SERPL-MCNC: 1.3 MG/DL (ref 0.5–1.4)
DIFFERENTIAL METHOD: ABNORMAL
EOSINOPHIL # BLD AUTO: 0.2 K/UL (ref 0–0.5)
EOSINOPHIL NFR BLD: 4.5 % (ref 0–8)
ERYTHROCYTE [DISTWIDTH] IN BLOOD BY AUTOMATED COUNT: 12.1 % (ref 11.5–14.5)
EST. GFR  (NO RACE VARIABLE): 58.7 ML/MIN/1.73 M^2
GLUCOSE SERPL-MCNC: 144 MG/DL (ref 70–110)
HCT VFR BLD AUTO: 44.6 % (ref 40–54)
HGB BLD-MCNC: 14.7 G/DL (ref 14–18)
IMM GRANULOCYTES # BLD AUTO: 0.01 K/UL (ref 0–0.04)
IMM GRANULOCYTES NFR BLD AUTO: 0.2 % (ref 0–0.5)
LYMPHOCYTES # BLD AUTO: 2.4 K/UL (ref 1–4.8)
LYMPHOCYTES NFR BLD: 45.3 % (ref 18–48)
MCH RBC QN AUTO: 30.1 PG (ref 27–31)
MCHC RBC AUTO-ENTMCNC: 33 G/DL (ref 32–36)
MCV RBC AUTO: 91 FL (ref 82–98)
MONOCYTES # BLD AUTO: 0.7 K/UL (ref 0.3–1)
MONOCYTES NFR BLD: 12.8 % (ref 4–15)
NEUTROPHILS # BLD AUTO: 2 K/UL (ref 1.8–7.7)
NEUTROPHILS NFR BLD: 36.6 % (ref 38–73)
NRBC BLD-RTO: 0 /100 WBC
PLATELET # BLD AUTO: 251 K/UL (ref 150–450)
PMV BLD AUTO: 9.9 FL (ref 9.2–12.9)
POTASSIUM SERPL-SCNC: 3.2 MMOL/L (ref 3.5–5.1)
PROT SERPL-MCNC: 8.8 G/DL (ref 6–8.4)
RBC # BLD AUTO: 4.88 M/UL (ref 4.6–6.2)
SODIUM SERPL-SCNC: 139 MMOL/L (ref 136–145)
TROPONIN I SERPL-MCNC: 0.04 NG/ML (ref 0.01–0.03)
UUN UR-MCNC: 18 MG/DL (ref 2–20)
WBC # BLD AUTO: 5.32 K/UL (ref 3.9–12.7)

## 2023-09-13 PROCEDURE — 93010 ELECTROCARDIOGRAM REPORT: CPT | Mod: ,,, | Performed by: INTERNAL MEDICINE

## 2023-09-13 PROCEDURE — 80053 COMPREHEN METABOLIC PANEL: CPT | Mod: ER | Performed by: STUDENT IN AN ORGANIZED HEALTH CARE EDUCATION/TRAINING PROGRAM

## 2023-09-13 PROCEDURE — 85025 COMPLETE CBC W/AUTO DIFF WBC: CPT | Mod: ER | Performed by: STUDENT IN AN ORGANIZED HEALTH CARE EDUCATION/TRAINING PROGRAM

## 2023-09-13 PROCEDURE — 93010 EKG 12-LEAD: ICD-10-PCS | Mod: ,,, | Performed by: INTERNAL MEDICINE

## 2023-09-13 PROCEDURE — 99291 CRITICAL CARE FIRST HOUR: CPT | Mod: ER

## 2023-09-13 PROCEDURE — 99900035 HC TECH TIME PER 15 MIN (STAT): Mod: ER

## 2023-09-13 PROCEDURE — 93005 ELECTROCARDIOGRAM TRACING: CPT | Mod: ER

## 2023-09-13 PROCEDURE — 25000003 PHARM REV CODE 250: Mod: ER | Performed by: STUDENT IN AN ORGANIZED HEALTH CARE EDUCATION/TRAINING PROGRAM

## 2023-09-13 PROCEDURE — 94760 N-INVAS EAR/PLS OXIMETRY 1: CPT | Mod: ER

## 2023-09-13 PROCEDURE — 84484 ASSAY OF TROPONIN QUANT: CPT | Mod: ER | Performed by: STUDENT IN AN ORGANIZED HEALTH CARE EDUCATION/TRAINING PROGRAM

## 2023-09-13 RX ORDER — MAG HYDROX/ALUMINUM HYD/SIMETH 200-200-20
30 SUSPENSION, ORAL (FINAL DOSE FORM) ORAL
Status: COMPLETED | OUTPATIENT
Start: 2023-09-13 | End: 2023-09-13

## 2023-09-13 RX ORDER — ASPIRIN 325 MG
325 TABLET, DELAYED RELEASE (ENTERIC COATED) ORAL
Status: COMPLETED | OUTPATIENT
Start: 2023-09-13 | End: 2023-09-13

## 2023-09-13 RX ADMIN — ASPIRIN 325 MG: 325 TABLET, COATED ORAL at 11:09

## 2023-09-13 RX ADMIN — ALUMINUM HYDROXIDE, MAGNESIUM HYDROXIDE, AND SIMETHICONE 30 ML: 200; 200; 20 SUSPENSION ORAL at 10:09

## 2023-09-13 NOTE — Clinical Note
The catheter was repositioned into the ostium   right coronary artery. An angiography was performed of the right coronary arteries. Multiple views were taken. The angiography was performed via hand injection with 25 mL of contrast.

## 2023-09-13 NOTE — Clinical Note
Diagnosis: Chest pain [332284]   Future Attending Provider: JEB MORRIS [[id number]]   Admitting Provider:: HANNAH AVELAR [[id number]]

## 2023-09-14 PROBLEM — K21.9 GERD (GASTROESOPHAGEAL REFLUX DISEASE): Status: ACTIVE | Noted: 2023-09-14

## 2023-09-14 PROBLEM — I21.19 ACUTE ST ELEVATION MYOCARDIAL INFARCTION (STEMI) OF INFERIOR WALL: Status: ACTIVE | Noted: 2023-09-14

## 2023-09-14 PROBLEM — E87.6 HYPOKALEMIA: Status: ACTIVE | Noted: 2023-09-14

## 2023-09-14 PROBLEM — I21.11 ST ELEVATION MYOCARDIAL INFARCTION INVOLVING RIGHT CORONARY ARTERY: Status: ACTIVE | Noted: 2023-09-14

## 2023-09-14 PROBLEM — I10 PRIMARY HYPERTENSION: Status: ACTIVE | Noted: 2023-09-14

## 2023-09-14 LAB
ABO + RH BLD: NORMAL
ALBUMIN SERPL BCP-MCNC: 4.1 G/DL (ref 3.5–5.2)
ALBUMIN SERPL BCP-MCNC: 4.1 G/DL (ref 3.5–5.2)
ALP SERPL-CCNC: 110 U/L (ref 55–135)
ALP SERPL-CCNC: 112 U/L (ref 55–135)
ALT SERPL W/O P-5'-P-CCNC: 37 U/L (ref 10–44)
ALT SERPL W/O P-5'-P-CCNC: 38 U/L (ref 10–44)
ANION GAP SERPL CALC-SCNC: 10 MMOL/L (ref 8–16)
ANION GAP SERPL CALC-SCNC: 12 MMOL/L (ref 8–16)
ANION GAP SERPL CALC-SCNC: 8 MMOL/L (ref 8–16)
APTT PPP: 24.3 SEC (ref 21–32)
ASCENDING AORTA: 3.99 CM
AST SERPL-CCNC: 23 U/L (ref 10–40)
AST SERPL-CCNC: 24 U/L (ref 10–40)
AV INDEX (PROSTH): 0.76
AV MEAN GRADIENT: 3 MMHG
AV PEAK GRADIENT: 5 MMHG
AV VALVE AREA BY VELOCITY RATIO: 3.67 CM²
AV VALVE AREA: 3.59 CM²
AV VELOCITY RATIO: 0.77
BASOPHILS # BLD AUTO: 0.02 K/UL (ref 0–0.2)
BASOPHILS NFR BLD: 0.3 % (ref 0–1.9)
BILIRUB SERPL-MCNC: 0.8 MG/DL (ref 0.1–1)
BILIRUB SERPL-MCNC: 0.8 MG/DL (ref 0.1–1)
BLD GP AB SCN CELLS X3 SERPL QL: NORMAL
BSA FOR ECHO PROCEDURE: 2.29 M2
BUN SERPL-MCNC: 14 MG/DL (ref 8–23)
BUN SERPL-MCNC: 15 MG/DL (ref 8–23)
BUN SERPL-MCNC: 16 MG/DL (ref 8–23)
CALCIUM SERPL-MCNC: 9.2 MG/DL (ref 8.7–10.5)
CALCIUM SERPL-MCNC: 9.2 MG/DL (ref 8.7–10.5)
CALCIUM SERPL-MCNC: 9.4 MG/DL (ref 8.7–10.5)
CHLORIDE SERPL-SCNC: 103 MMOL/L (ref 95–110)
CHLORIDE SERPL-SCNC: 104 MMOL/L (ref 95–110)
CHLORIDE SERPL-SCNC: 105 MMOL/L (ref 95–110)
CHOLEST SERPL-MCNC: 136 MG/DL (ref 120–199)
CHOLEST/HDLC SERPL: 3.4 {RATIO} (ref 2–5)
CO2 SERPL-SCNC: 23 MMOL/L (ref 23–29)
CO2 SERPL-SCNC: 25 MMOL/L (ref 23–29)
CO2 SERPL-SCNC: 26 MMOL/L (ref 23–29)
CREAT SERPL-MCNC: 1.3 MG/DL (ref 0.5–1.4)
CV ECHO LV RWT: 0.47 CM
DIFFERENTIAL METHOD: ABNORMAL
DOP CALC AO PEAK VEL: 1.1 M/S
DOP CALC AO VTI: 21.7 CM
DOP CALC LVOT AREA: 4.8 CM2
DOP CALC LVOT DIAMETER: 2.46 CM
DOP CALC LVOT PEAK VEL: 0.85 M/S
DOP CALC LVOT STROKE VOLUME: 77.91 CM3
DOP CALC MV VTI: 16.4 CM
DOP CALCLVOT PEAK VEL VTI: 16.4 CM
E WAVE DECELERATION TIME: 272.48 MSEC
E/A RATIO: 0.63
E/E' RATIO: 8.55 M/S
ECHO LV POSTERIOR WALL: 1.22 CM (ref 0.6–1.1)
EOSINOPHIL # BLD AUTO: 0.2 K/UL (ref 0–0.5)
EOSINOPHIL NFR BLD: 2.6 % (ref 0–8)
ERYTHROCYTE [DISTWIDTH] IN BLOOD BY AUTOMATED COUNT: 12.1 % (ref 11.5–14.5)
ERYTHROCYTE [DISTWIDTH] IN BLOOD BY AUTOMATED COUNT: 12.2 % (ref 11.5–14.5)
EST. GFR  (NO RACE VARIABLE): 59 ML/MIN/1.73 M^2
ESTIMATED AVG GLUCOSE: 117 MG/DL (ref 68–131)
FRACTIONAL SHORTENING: 31 % (ref 28–44)
GLUCOSE SERPL-MCNC: 120 MG/DL (ref 70–110)
GLUCOSE SERPL-MCNC: 131 MG/DL (ref 70–110)
GLUCOSE SERPL-MCNC: 133 MG/DL (ref 70–110)
HBA1C MFR BLD: 5.7 % (ref 4–5.6)
HCT VFR BLD AUTO: 43.2 % (ref 40–54)
HCT VFR BLD AUTO: 43.7 % (ref 40–54)
HDLC SERPL-MCNC: 40 MG/DL (ref 40–75)
HDLC SERPL: 29.4 % (ref 20–50)
HGB BLD-MCNC: 14.8 G/DL (ref 14–18)
HGB BLD-MCNC: 14.8 G/DL (ref 14–18)
IMM GRANULOCYTES # BLD AUTO: 0.01 K/UL (ref 0–0.04)
IMM GRANULOCYTES NFR BLD AUTO: 0.1 % (ref 0–0.5)
INR PPP: 1 (ref 0.8–1.2)
INTERVENTRICULAR SEPTUM: 1.2 CM (ref 0.6–1.1)
IVC DIAMETER: 1.18 CM
LA MAJOR: 4.08 CM
LA MINOR: 3.27 CM
LA WIDTH: 3.4 CM
LDLC SERPL CALC-MCNC: 88.8 MG/DL (ref 63–159)
LEFT ATRIUM SIZE: 3.35 CM
LEFT ATRIUM VOLUME INDEX MOD: 10.1 ML/M2
LEFT ATRIUM VOLUME INDEX: 15.7 ML/M2
LEFT ATRIUM VOLUME MOD: 22.64 CM3
LEFT ATRIUM VOLUME: 35.15 CM3
LEFT INTERNAL DIMENSION IN SYSTOLE: 3.55 CM (ref 2.1–4)
LEFT VENTRICLE DIASTOLIC VOLUME INDEX: 56.67 ML/M2
LEFT VENTRICLE DIASTOLIC VOLUME: 126.93 ML
LEFT VENTRICLE MASS INDEX: 111 G/M2
LEFT VENTRICLE SYSTOLIC VOLUME INDEX: 23.5 ML/M2
LEFT VENTRICLE SYSTOLIC VOLUME: 52.54 ML
LEFT VENTRICULAR INTERNAL DIMENSION IN DIASTOLE: 5.16 CM (ref 3.5–6)
LEFT VENTRICULAR MASS: 248.65 G
LV LATERAL E/E' RATIO: 6.71 M/S
LV SEPTAL E/E' RATIO: 11.75 M/S
LVOT MG: 1.39 MMHG
LVOT MV: 0.55 CM/S
LYMPHOCYTES # BLD AUTO: 1.7 K/UL (ref 1–4.8)
LYMPHOCYTES NFR BLD: 24.1 % (ref 18–48)
MAGNESIUM SERPL-MCNC: 1.9 MG/DL (ref 1.6–2.6)
MCH RBC QN AUTO: 30.8 PG (ref 27–31)
MCH RBC QN AUTO: 31.2 PG (ref 27–31)
MCHC RBC AUTO-ENTMCNC: 33.9 G/DL (ref 32–36)
MCHC RBC AUTO-ENTMCNC: 34.3 G/DL (ref 32–36)
MCV RBC AUTO: 91 FL (ref 82–98)
MCV RBC AUTO: 91 FL (ref 82–98)
MONOCYTES # BLD AUTO: 0.6 K/UL (ref 0.3–1)
MONOCYTES NFR BLD: 7.9 % (ref 4–15)
MV A" WAVE DURATION": 128.45 MSEC
MV MEAN GRADIENT: 1 MMHG
MV PEAK A VEL: 0.75 M/S
MV PEAK E VEL: 0.47 M/S
MV PEAK GRADIENT: 2 MMHG
MV STENOSIS PRESSURE HALF TIME: 79.02 MS
MV VALVE AREA BY CONTINUITY EQUATION: 4.75 CM2
MV VALVE AREA P 1/2 METHOD: 2.78 CM2
NEUTROPHILS # BLD AUTO: 4.7 K/UL (ref 1.8–7.7)
NEUTROPHILS NFR BLD: 65 % (ref 38–73)
NONHDLC SERPL-MCNC: 96 MG/DL
NRBC BLD-RTO: 0 /100 WBC
OHS LV EJECTION FRACTION SIMPSONS BIPLANE MOD: 65 %
PHOSPHATE SERPL-MCNC: 2.1 MG/DL (ref 2.7–4.5)
PHOSPHATE SERPL-MCNC: 2.2 MG/DL (ref 2.7–4.5)
PISA TR MAX VEL: 1.77 M/S
PLATELET # BLD AUTO: 223 K/UL (ref 150–450)
PLATELET # BLD AUTO: 237 K/UL (ref 150–450)
PMV BLD AUTO: 10 FL (ref 9.2–12.9)
PMV BLD AUTO: 10.2 FL (ref 9.2–12.9)
POC ACTIVATED CLOTTING TIME K: 305 SEC (ref 74–137)
POTASSIUM SERPL-SCNC: 3.2 MMOL/L (ref 3.5–5.1)
POTASSIUM SERPL-SCNC: 3.2 MMOL/L (ref 3.5–5.1)
POTASSIUM SERPL-SCNC: 3.7 MMOL/L (ref 3.5–5.1)
PROT SERPL-MCNC: 7.6 G/DL (ref 6–8.4)
PROT SERPL-MCNC: 7.7 G/DL (ref 6–8.4)
PROTHROMBIN TIME: 10.9 SEC (ref 9–12.5)
PULM VEIN S/D RATIO: 1.82
PV MV: 0.58 M/S
PV PEAK D VEL: 0.34 M/S
PV PEAK GRADIENT: 2 MMHG
PV PEAK S VEL: 0.62 M/S
PV PEAK VELOCITY: 0.77 M/S
RA MAJOR: 4.3 CM
RA PRESSURE ESTIMATED: 3 MMHG
RA WIDTH: 2.71 CM
RBC # BLD AUTO: 4.75 M/UL (ref 4.6–6.2)
RBC # BLD AUTO: 4.81 M/UL (ref 4.6–6.2)
RIGHT VENTRICULAR END-DIASTOLIC DIMENSION: 3.41 CM
RV TB RVSP: 5 MMHG
RV TISSUE DOPPLER FREE WALL SYSTOLIC VELOCITY 1 (APICAL 4 CHAMBER VIEW): 9.84 CM/S
SAMPLE: ABNORMAL
SINUS: 4.07 CM
SODIUM SERPL-SCNC: 138 MMOL/L (ref 136–145)
SODIUM SERPL-SCNC: 139 MMOL/L (ref 136–145)
SODIUM SERPL-SCNC: 139 MMOL/L (ref 136–145)
SPECIMEN OUTDATE: NORMAL
STJ: 3.8 CM
TDI LATERAL: 0.07 M/S
TDI SEPTAL: 0.04 M/S
TDI: 0.06 M/S
TR MAX PG: 13 MMHG
TRIGL SERPL-MCNC: 36 MG/DL (ref 30–150)
TROPONIN I SERPL-MCNC: 0.04 NG/ML (ref 0.01–0.03)
TSH SERPL DL<=0.005 MIU/L-ACNC: 1.19 UIU/ML (ref 0.4–4)
TV REST PULMONARY ARTERY PRESSURE: 16 MMHG
WBC # BLD AUTO: 7.18 K/UL (ref 3.9–12.7)
WBC # BLD AUTO: 7.18 K/UL (ref 3.9–12.7)
Z-SCORE OF LEFT VENTRICULAR DIMENSION IN END DIASTOLE: -4.4
Z-SCORE OF LEFT VENTRICULAR DIMENSION IN END SYSTOLE: -2.48

## 2023-09-14 PROCEDURE — 93458 L HRT ARTERY/VENTRICLE ANGIO: CPT | Mod: 26,59,51, | Performed by: INTERNAL MEDICINE

## 2023-09-14 PROCEDURE — C1894 INTRO/SHEATH, NON-LASER: HCPCS | Performed by: INTERNAL MEDICINE

## 2023-09-14 PROCEDURE — 86900 BLOOD TYPING SEROLOGIC ABO: CPT | Performed by: STUDENT IN AN ORGANIZED HEALTH CARE EDUCATION/TRAINING PROGRAM

## 2023-09-14 PROCEDURE — 92941 PRQ TRLML REVSC TOT OCCL AMI: CPT | Mod: RC,,, | Performed by: INTERNAL MEDICINE

## 2023-09-14 PROCEDURE — 83735 ASSAY OF MAGNESIUM: CPT

## 2023-09-14 PROCEDURE — 84100 ASSAY OF PHOSPHORUS: CPT | Mod: 91 | Performed by: INTERNAL MEDICINE

## 2023-09-14 PROCEDURE — 25000003 PHARM REV CODE 250: Mod: ER | Performed by: STUDENT IN AN ORGANIZED HEALTH CARE EDUCATION/TRAINING PROGRAM

## 2023-09-14 PROCEDURE — 93010 EKG 12-LEAD: ICD-10-PCS | Mod: ,,, | Performed by: INTERNAL MEDICINE

## 2023-09-14 PROCEDURE — 25000003 PHARM REV CODE 250: Performed by: INTERNAL MEDICINE

## 2023-09-14 PROCEDURE — 25000003 PHARM REV CODE 250

## 2023-09-14 PROCEDURE — C1887 CATHETER, GUIDING: HCPCS | Performed by: INTERNAL MEDICINE

## 2023-09-14 PROCEDURE — 84100 ASSAY OF PHOSPHORUS: CPT

## 2023-09-14 PROCEDURE — 85730 THROMBOPLASTIN TIME PARTIAL: CPT | Mod: ER | Performed by: STUDENT IN AN ORGANIZED HEALTH CARE EDUCATION/TRAINING PROGRAM

## 2023-09-14 PROCEDURE — C1753 CATH, INTRAVAS ULTRASOUND: HCPCS | Performed by: INTERNAL MEDICINE

## 2023-09-14 PROCEDURE — 80061 LIPID PANEL: CPT

## 2023-09-14 PROCEDURE — 93005 ELECTROCARDIOGRAM TRACING: CPT

## 2023-09-14 PROCEDURE — C1725 CATH, TRANSLUMIN NON-LASER: HCPCS | Performed by: INTERNAL MEDICINE

## 2023-09-14 PROCEDURE — 96365 THER/PROPH/DIAG IV INF INIT: CPT | Mod: ER

## 2023-09-14 PROCEDURE — 99152 PR MOD CONSCIOUS SEDATION, SAME PHYS, 5+ YRS, FIRST 15 MIN: ICD-10-PCS | Mod: ,,, | Performed by: INTERNAL MEDICINE

## 2023-09-14 PROCEDURE — 96361 HYDRATE IV INFUSION ADD-ON: CPT

## 2023-09-14 PROCEDURE — C9601 PERC DRUG-EL COR STENT BRAN: HCPCS | Mod: RC | Performed by: INTERNAL MEDICINE

## 2023-09-14 PROCEDURE — 85347 COAGULATION TIME ACTIVATED: CPT | Performed by: INTERNAL MEDICINE

## 2023-09-14 PROCEDURE — 92929 PR STENT, ADD'L VESSEL: CPT | Mod: RC,,, | Performed by: INTERNAL MEDICINE

## 2023-09-14 PROCEDURE — 92929 PR STENT, ADD'L VESSEL: ICD-10-PCS | Mod: RC,,, | Performed by: INTERNAL MEDICINE

## 2023-09-14 PROCEDURE — 27201423 OPTIME MED/SURG SUP & DEVICES STERILE SUPPLY: Performed by: INTERNAL MEDICINE

## 2023-09-14 PROCEDURE — 80053 COMPREHEN METABOLIC PANEL: CPT | Mod: 91

## 2023-09-14 PROCEDURE — 25500020 PHARM REV CODE 255: Performed by: INTERNAL MEDICINE

## 2023-09-14 PROCEDURE — 93010 ELECTROCARDIOGRAM REPORT: CPT | Mod: ,,, | Performed by: INTERNAL MEDICINE

## 2023-09-14 PROCEDURE — 99152 MOD SED SAME PHYS/QHP 5/>YRS: CPT | Mod: ,,, | Performed by: INTERNAL MEDICINE

## 2023-09-14 PROCEDURE — 92941 PR AMI ANY METHOD: ICD-10-PCS | Mod: RC,,, | Performed by: INTERNAL MEDICINE

## 2023-09-14 PROCEDURE — 93458 PR CATH PLACE/CORON ANGIO, IMG SUPER/INTERP,W LEFT HEART VENTRICULOGRAPHY: ICD-10-PCS | Mod: 26,59,51, | Performed by: INTERNAL MEDICINE

## 2023-09-14 PROCEDURE — 99214 PR OFFICE/OUTPT VISIT, EST, LEVL IV, 30-39 MIN: ICD-10-PCS | Mod: 25,,, | Performed by: INTERNAL MEDICINE

## 2023-09-14 PROCEDURE — 36415 COLL VENOUS BLD VENIPUNCTURE: CPT

## 2023-09-14 PROCEDURE — 84484 ASSAY OF TROPONIN QUANT: CPT | Mod: ER | Performed by: STUDENT IN AN ORGANIZED HEALTH CARE EDUCATION/TRAINING PROGRAM

## 2023-09-14 PROCEDURE — 80048 BASIC METABOLIC PNL TOTAL CA: CPT | Mod: XB

## 2023-09-14 PROCEDURE — C9606 PERC D-E COR REVASC W AMI S: HCPCS | Mod: RC | Performed by: INTERNAL MEDICINE

## 2023-09-14 PROCEDURE — 92978 ENDOLUMINL IVUS OCT C 1ST: CPT | Mod: RC | Performed by: INTERNAL MEDICINE

## 2023-09-14 PROCEDURE — C1874 STENT, COATED/COV W/DEL SYS: HCPCS | Performed by: INTERNAL MEDICINE

## 2023-09-14 PROCEDURE — 92978 PR IVUS, CORONARY, 1ST VESSEL: ICD-10-PCS | Mod: 26,RC,, | Performed by: INTERNAL MEDICINE

## 2023-09-14 PROCEDURE — 85027 COMPLETE CBC AUTOMATED: CPT

## 2023-09-14 PROCEDURE — 83036 HEMOGLOBIN GLYCOSYLATED A1C: CPT

## 2023-09-14 PROCEDURE — 92978 ENDOLUMINL IVUS OCT C 1ST: CPT | Mod: 26,RC,, | Performed by: INTERNAL MEDICINE

## 2023-09-14 PROCEDURE — 21400001 HC TELEMETRY ROOM

## 2023-09-14 PROCEDURE — 85025 COMPLETE CBC W/AUTO DIFF WBC: CPT | Performed by: STUDENT IN AN ORGANIZED HEALTH CARE EDUCATION/TRAINING PROGRAM

## 2023-09-14 PROCEDURE — 85610 PROTHROMBIN TIME: CPT | Mod: ER | Performed by: STUDENT IN AN ORGANIZED HEALTH CARE EDUCATION/TRAINING PROGRAM

## 2023-09-14 PROCEDURE — 80053 COMPREHEN METABOLIC PANEL: CPT | Performed by: STUDENT IN AN ORGANIZED HEALTH CARE EDUCATION/TRAINING PROGRAM

## 2023-09-14 PROCEDURE — C1769 GUIDE WIRE: HCPCS | Performed by: INTERNAL MEDICINE

## 2023-09-14 PROCEDURE — 63600175 PHARM REV CODE 636 W HCPCS: Mod: ER | Performed by: STUDENT IN AN ORGANIZED HEALTH CARE EDUCATION/TRAINING PROGRAM

## 2023-09-14 PROCEDURE — 99214 OFFICE O/P EST MOD 30 MIN: CPT | Mod: 25,,, | Performed by: INTERNAL MEDICINE

## 2023-09-14 PROCEDURE — 84443 ASSAY THYROID STIM HORMONE: CPT

## 2023-09-14 PROCEDURE — 63600175 PHARM REV CODE 636 W HCPCS: Performed by: INTERNAL MEDICINE

## 2023-09-14 PROCEDURE — C1876 STENT, NON-COA/NON-COV W/DEL: HCPCS | Performed by: INTERNAL MEDICINE

## 2023-09-14 PROCEDURE — 93458 L HRT ARTERY/VENTRICLE ANGIO: CPT | Performed by: INTERNAL MEDICINE

## 2023-09-14 DEVICE — EVEROLIMUS-ELUTING PLATINUM CHROMIUM CORONARY STENT SYSTEM
Type: IMPLANTABLE DEVICE | Site: CORONARY | Status: FUNCTIONAL
Brand: SYNERGY™ XD

## 2023-09-14 DEVICE — EVEROLIMUS-ELUTING PLATINUM CHROMIUM CORONARY STENT SYSTEM
Type: IMPLANTABLE DEVICE | Site: CORONARY | Status: FUNCTIONAL
Brand: SYNERGY MEGATRON™

## 2023-09-14 RX ORDER — IODIXANOL 320 MG/ML
INJECTION, SOLUTION INTRAVASCULAR
Status: DISCONTINUED | OUTPATIENT
Start: 2023-09-14 | End: 2023-09-14 | Stop reason: HOSPADM

## 2023-09-14 RX ORDER — FENTANYL CITRATE 50 UG/ML
INJECTION, SOLUTION INTRAMUSCULAR; INTRAVENOUS
Status: DISCONTINUED | OUTPATIENT
Start: 2023-09-14 | End: 2023-09-14 | Stop reason: HOSPADM

## 2023-09-14 RX ORDER — SODIUM,POTASSIUM PHOSPHATES 280-250MG
1 POWDER IN PACKET (EA) ORAL
Status: DISCONTINUED | OUTPATIENT
Start: 2023-09-14 | End: 2023-09-15 | Stop reason: HOSPADM

## 2023-09-14 RX ORDER — HYDROCHLOROTHIAZIDE 25 MG/1
25 TABLET ORAL DAILY
Status: DISCONTINUED | OUTPATIENT
Start: 2023-09-14 | End: 2023-09-15 | Stop reason: HOSPADM

## 2023-09-14 RX ORDER — HEPARIN SODIUM 200 [USP'U]/100ML
INJECTION, SOLUTION INTRAVENOUS
Status: DISCONTINUED | OUTPATIENT
Start: 2023-09-14 | End: 2023-09-14

## 2023-09-14 RX ORDER — MIDAZOLAM HYDROCHLORIDE 1 MG/ML
INJECTION, SOLUTION INTRAMUSCULAR; INTRAVENOUS
Status: DISCONTINUED | OUTPATIENT
Start: 2023-09-14 | End: 2023-09-14 | Stop reason: HOSPADM

## 2023-09-14 RX ORDER — HEPARIN SODIUM,PORCINE/D5W 25000/250
0-40 INTRAVENOUS SOLUTION INTRAVENOUS CONTINUOUS
Status: DISCONTINUED | OUTPATIENT
Start: 2023-09-14 | End: 2023-09-14

## 2023-09-14 RX ORDER — PANTOPRAZOLE SODIUM 40 MG/1
40 TABLET, DELAYED RELEASE ORAL 2 TIMES DAILY
Status: DISCONTINUED | OUTPATIENT
Start: 2023-09-14 | End: 2023-09-15 | Stop reason: HOSPADM

## 2023-09-14 RX ORDER — POTASSIUM CHLORIDE 7.45 MG/ML
10 INJECTION INTRAVENOUS
Status: DISCONTINUED | OUTPATIENT
Start: 2023-09-14 | End: 2023-09-14

## 2023-09-14 RX ORDER — HEPARIN SODIUM 5000 [USP'U]/ML
INJECTION, SOLUTION INTRAVENOUS; SUBCUTANEOUS
Status: DISCONTINUED
Start: 2023-09-14 | End: 2023-09-14 | Stop reason: WASHOUT

## 2023-09-14 RX ORDER — SODIUM,POTASSIUM PHOSPHATES 280-250MG
2 POWDER IN PACKET (EA) ORAL ONCE
Status: COMPLETED | OUTPATIENT
Start: 2023-09-14 | End: 2023-09-14

## 2023-09-14 RX ORDER — NAPROXEN SODIUM 220 MG/1
81 TABLET, FILM COATED ORAL DAILY
Status: DISCONTINUED | OUTPATIENT
Start: 2023-09-14 | End: 2023-09-15 | Stop reason: HOSPADM

## 2023-09-14 RX ORDER — LOSARTAN POTASSIUM 50 MG/1
100 TABLET ORAL DAILY
Status: DISCONTINUED | OUTPATIENT
Start: 2023-09-14 | End: 2023-09-15 | Stop reason: HOSPADM

## 2023-09-14 RX ORDER — LIDOCAINE HYDROCHLORIDE 10 MG/ML
INJECTION, SOLUTION EPIDURAL; INFILTRATION; INTRACAUDAL; PERINEURAL
Status: DISCONTINUED | OUTPATIENT
Start: 2023-09-14 | End: 2023-09-14 | Stop reason: HOSPADM

## 2023-09-14 RX ORDER — VERAPAMIL HYDROCHLORIDE 2.5 MG/ML
INJECTION, SOLUTION INTRAVENOUS
Status: DISCONTINUED | OUTPATIENT
Start: 2023-09-14 | End: 2023-09-14 | Stop reason: HOSPADM

## 2023-09-14 RX ORDER — HEPARIN SODIUM 1000 [USP'U]/ML
INJECTION, SOLUTION INTRAVENOUS; SUBCUTANEOUS
Status: DISCONTINUED | OUTPATIENT
Start: 2023-09-14 | End: 2023-09-14 | Stop reason: HOSPADM

## 2023-09-14 RX ORDER — MUPIROCIN 20 MG/G
OINTMENT TOPICAL 2 TIMES DAILY
Status: CANCELLED | OUTPATIENT
Start: 2023-09-14 | End: 2023-09-19

## 2023-09-14 RX ORDER — ACETAMINOPHEN 325 MG/1
650 TABLET ORAL EVERY 4 HOURS PRN
Status: DISCONTINUED | OUTPATIENT
Start: 2023-09-14 | End: 2023-09-15 | Stop reason: HOSPADM

## 2023-09-14 RX ORDER — POTASSIUM CHLORIDE 20 MEQ/1
60 TABLET, EXTENDED RELEASE ORAL ONCE
Status: COMPLETED | OUTPATIENT
Start: 2023-09-14 | End: 2023-09-14

## 2023-09-14 RX ORDER — SODIUM CHLORIDE 9 MG/ML
INJECTION, SOLUTION INTRAVENOUS CONTINUOUS
Status: ACTIVE | OUTPATIENT
Start: 2023-09-14 | End: 2023-09-14

## 2023-09-14 RX ORDER — SODIUM,POTASSIUM PHOSPHATES 280-250MG
1 POWDER IN PACKET (EA) ORAL
Status: DISCONTINUED | OUTPATIENT
Start: 2023-09-14 | End: 2023-09-14

## 2023-09-14 RX ORDER — ATORVASTATIN CALCIUM 40 MG/1
80 TABLET, FILM COATED ORAL DAILY
Status: DISCONTINUED | OUTPATIENT
Start: 2023-09-14 | End: 2023-09-15 | Stop reason: HOSPADM

## 2023-09-14 RX ORDER — MUPIROCIN 20 MG/G
OINTMENT TOPICAL 2 TIMES DAILY
Status: DISCONTINUED | OUTPATIENT
Start: 2023-09-14 | End: 2023-09-15 | Stop reason: HOSPADM

## 2023-09-14 RX ORDER — CARVEDILOL 25 MG/1
25 TABLET ORAL 2 TIMES DAILY
Status: DISCONTINUED | OUTPATIENT
Start: 2023-09-14 | End: 2023-09-15 | Stop reason: HOSPADM

## 2023-09-14 RX ADMIN — HEPARIN SODIUM 12 UNITS/KG/HR: 10000 INJECTION, SOLUTION INTRAVENOUS at 12:09

## 2023-09-14 RX ADMIN — TICAGRELOR 90 MG: 90 TABLET ORAL at 08:09

## 2023-09-14 RX ADMIN — PANTOPRAZOLE SODIUM 40 MG: 40 TABLET, DELAYED RELEASE ORAL at 08:09

## 2023-09-14 RX ADMIN — ATORVASTATIN CALCIUM 80 MG: 40 TABLET, FILM COATED ORAL at 04:09

## 2023-09-14 RX ADMIN — POTASSIUM CHLORIDE 60 MEQ: 1500 TABLET, EXTENDED RELEASE ORAL at 04:09

## 2023-09-14 RX ADMIN — CARVEDILOL 25 MG: 25 TABLET, FILM COATED ORAL at 08:09

## 2023-09-14 RX ADMIN — MUPIROCIN: 20 OINTMENT TOPICAL at 08:09

## 2023-09-14 RX ADMIN — SODIUM CHLORIDE: 9 INJECTION, SOLUTION INTRAVENOUS at 03:09

## 2023-09-14 RX ADMIN — TICAGRELOR 180 MG: 90 TABLET ORAL at 12:09

## 2023-09-14 RX ADMIN — HYDROCHLOROTHIAZIDE 25 MG: 25 TABLET ORAL at 08:09

## 2023-09-14 RX ADMIN — ASPIRIN 81 MG: 81 TABLET, CHEWABLE ORAL at 08:09

## 2023-09-14 RX ADMIN — POTASSIUM & SODIUM PHOSPHATES POWDER PACK 280-160-250 MG 1 PACKET: 280-160-250 PACK at 08:09

## 2023-09-14 RX ADMIN — LOSARTAN POTASSIUM 100 MG: 50 TABLET, FILM COATED ORAL at 08:09

## 2023-09-14 RX ADMIN — ACETAMINOPHEN 650 MG: 325 TABLET ORAL at 08:09

## 2023-09-14 RX ADMIN — POTASSIUM & SODIUM PHOSPHATES POWDER PACK 280-160-250 MG 2 PACKET: 280-160-250 PACK at 06:09

## 2023-09-14 RX ADMIN — POTASSIUM & SODIUM PHOSPHATES POWDER PACK 280-160-250 MG 1 PACKET: 280-160-250 PACK at 05:09

## 2023-09-14 NOTE — SUBJECTIVE & OBJECTIVE
Past Medical History:   Diagnosis Date    Hypertension     Prostate cancer        Past Surgical History:   Procedure Laterality Date    APPENDECTOMY      COLONOSCOPY N/A 5/15/2018    Procedure: COLONOSCOPY;  Surgeon: Edison Amado Jr., MD;  Location: [facility];  Service: Endoscopy;  Laterality: N/A;    PROSTATE SURGERY         Review of patient's allergies indicates:  No Known Allergies    No current facility-administered medications on file prior to encounter.     Current Outpatient Medications on File Prior to Encounter   Medication Sig    amlodipine (NORVASC) 10 MG tablet Take 1 tablet (10 mg total) by mouth once daily.    aspirin (ECOTRIN) 81 MG EC tablet Take 1 tablet (81 mg total) by mouth once daily.    atorvastatin (LIPITOR) 40 MG tablet Take 40 mg by mouth once daily.    carvedilol (COREG) 25 MG tablet TAKE 1 TABLET BY MOUTH TWICE DAILY    hydroCHLOROthiazide (HYDRODIURIL) 25 MG tablet Take 25 mg by mouth once daily.    losartan (COZAAR) 100 MG tablet Take 100 mg by mouth once daily.    pantoprazole (PROTONIX) 40 MG tablet Take 40 mg by mouth 2 (two) times daily.    spironolactone (ALDACTONE) 25 MG tablet Take 1 tablet (25 mg total) by mouth once daily.    traMADoL (ULTRAM) 50 mg tablet Take 50 mg by mouth 2 (two) times daily as needed.     Family History       Problem Relation (Age of Onset)    No Known Problems Mother, Father, Sister          Tobacco Use    Smoking status: Former    Smokeless tobacco: Never   Substance and Sexual Activity    Alcohol use: No    Drug use: No    Sexual activity: Not on file     Review of Systems   Constitutional: Negative for diaphoresis and fever.   HENT:  Negative for congestion and hearing loss.    Eyes:  Negative for blurred vision and pain.   Cardiovascular:  Positive for chest pain and dyspnea on exertion. Negative for claudication, leg swelling, near-syncope, palpitations and syncope.   Respiratory:  Negative for shortness of breath and sleep disturbances due to  breathing.    Hematologic/Lymphatic: Negative for bleeding problem. Does not bruise/bleed easily.   Skin:  Negative for color change and poor wound healing.   Gastrointestinal:  Negative for abdominal pain and nausea.   Genitourinary:  Negative for bladder incontinence and flank pain.   Neurological:  Negative for focal weakness and light-headedness.     Objective:     Vital Signs (Most Recent):  Temp: 98.5 °F (36.9 °C) (09/13/23 2119)  Pulse: 66 (09/14/23 0003)  Resp: 19 (09/14/23 0003)  BP: (!) 184/91 (09/14/23 0014)  SpO2: 99 % (09/14/23 0003) Vital Signs (24h Range):  Temp:  [98.5 °F (36.9 °C)] 98.5 °F (36.9 °C)  Pulse:  [61-71] 66  Resp:  [15-20] 19  SpO2:  [98 %-99 %] 99 %  BP: (181-193)/(91-97) 184/91     Weight: 106.6 kg (235 lb)  Body mass index is 33.72 kg/m².    SpO2: 99 %       No intake or output data in the 24 hours ending 09/14/23 0045    Lines/Drains/Airways       Peripheral Intravenous Line  Duration                  Peripheral IV - Single Lumen 09/13/23 2349 20 G Anterior;Right Wrist <1 day         Peripheral IV - Single Lumen 09/14/23 0001 20 G Left Antecubital <1 day                     Physical Exam  Constitutional:       Appearance: He is well-developed. He is not diaphoretic.   HENT:      Head: Normocephalic and atraumatic.   Eyes:      General: No scleral icterus.     Pupils: Pupils are equal, round, and reactive to light.   Neck:      Vascular: No JVD.   Cardiovascular:      Rate and Rhythm: Normal rate and regular rhythm.      Pulses: Intact distal pulses.      Heart sounds: S1 normal and S2 normal. No murmur heard.     No friction rub. No gallop.   Pulmonary:      Effort: Pulmonary effort is normal. No respiratory distress.      Breath sounds: Normal breath sounds. No wheezing or rales.   Chest:      Chest wall: No tenderness.   Abdominal:      General: Bowel sounds are normal. There is no distension.      Palpations: Abdomen is soft. There is no mass.      Tenderness: There is no  "abdominal tenderness. There is no rebound.   Musculoskeletal:         General: No tenderness. Normal range of motion.      Cervical back: Normal range of motion and neck supple.      Right lower leg: No edema.      Left lower leg: No edema.   Skin:     General: Skin is warm and dry.      Coloration: Skin is not pale.   Neurological:      Mental Status: He is alert and oriented to person, place, and time.      Coordination: Coordination normal.      Deep Tendon Reflexes: Reflexes normal.   Psychiatric:         Behavior: Behavior normal.         Judgment: Judgment normal.          Significant Labs: BMP:   Recent Labs   Lab 09/13/23 2157   *      K 3.2*      CO2 27   BUN 18   CREATININE 1.30   CALCIUM 9.5   , CMP   Recent Labs   Lab 09/13/23 2157      K 3.2*      CO2 27   *   BUN 18   CREATININE 1.30   CALCIUM 9.5   PROT 8.8*   ALBUMIN 4.5   BILITOT 0.7   ALKPHOS 111   AST 40   ALT 49*   ANIONGAP 9   , CBC   Recent Labs   Lab 09/13/23 2157   WBC 5.32   HGB 14.7   HCT 44.6      , INR   Recent Labs   Lab 09/14/23  0015   INR 1.0   , Lipid Panel No results for input(s): "CHOL", "HDL", "LDLCALC", "TRIG", "CHOLHDL" in the last 48 hours., Troponin   Recent Labs   Lab 09/13/23 2157   TROPONINI 0.039*   , and All pertinent lab results from the last 24 hours have been reviewed.    Significant Imaging: Echocardiogram: 2D echo with color flow doppler:   Results for orders placed or performed during the hospital encounter of 08/10/18   Echo doppler color flow   Result Value Ref Range    EF + QEF 55 55 - 65    Diastolic Dysfunction No     Mitral Valve Mobility NORMAL     Tricuspid Valve Regurgitation TRIVIAL     Narrative    Date of Procedure: 08/10/2018        TEST DESCRIPTION   Technical Quality: This is a technically adequate study.     Aorta: The aortic root is normal in size. Aortic root measures 3.2 cm at Sinuses of Valsalva.     Left Atrium: The left atrial volume index is " normal, measuring 21.75 cc/m2.     Left Ventricle: The left ventricle is normal in size, with an end-diastolic diameter of 5.2 cm, and an end-systolic diameter of 4.0 cm. LV wall thickness is normal, with the septum measuring 1.1 cm and the posterior wall measuring 1.0 cm across. Relative   wall thickness was normal at 0.38, and the LV mass index was 108.3 g/m2 consistent with normal left ventricular mass. There are no regional wall motion abnormalities. Left ventricular systolic function appears normal. Visually estimated ejection   fraction is 55-60%. The LV Doppler derived stroke volume equals 93.0 ccs.     Diastolic indices: E wave velocity 0.6 m/s, E/A ratio 0.9,  msec., E/e' ratio(avg) 6. Diastolic function is normal.     Right Atrium: The right atrium is normal in size, measuring 4.2 cm in length and 3.0 cm in width in the apical view.     Right Ventricle: The right ventricle is normal in size measuring 3.0 cm at the base in the apical right ventricle-focused view. Global right ventricular systolic function appears normal. Tricuspid annular plane systolic excursion (TAPSE) is 2.7 cm.     Aortic Valve:  The aortic valve is normal in structure with normal leaflet mobility. The aortic valve is tri-leaflet in structure.     Mitral Valve:  The mitral valve is normal in structure with normal leaflet mobility. The pressure half time is 64 msec. The calculated mitral valve area is 3.44 cm2.     Tricuspid Valve:  The tricuspid valve is normal in structure with normal leaflet mobility. There is trivial tricuspid regurgitation.     Pulmonary Valve:  The pulmonic valve is not well seen.     IVC: IVC is normal in size and collapses > 50% with a sniff, suggesting normal right atrial pressure of 3 mmHg.     Atrial Septum: The atrial septum is intact.     Intracavitary: There is no evidence of pericardial effusion, intracavity mass, thrombi, or vegetation.         CONCLUSIONS     1 - Normal left ventricular systolic  "function (EF 55-60%).     2 - Normal left ventricular diastolic function.     3 - Normal right ventricular systolic function .             This document has been electronically    SIGNED BY: Eladio Domínguez MD On: 08/10/2018 12:12    and Transthoracic echo (TTE) complete (Cupid Only):   Results for orders placed or performed during the hospital encounter of 01/18/23   Echo   Result Value Ref Range    BSA 2.23 m2    TDI SEPTAL 0.06 m/s    LV LATERAL E/E' RATIO 5.18 m/s    LV SEPTAL E/E' RATIO 9.50 m/s    LA WIDTH 3.40 cm    IVC diameter 1.95 cm    Left Ventricular Outflow Tract Mean Velocity 0.54 cm/s    Left Ventricular Outflow Tract Mean Gradient 1.29 mmHg    TDI LATERAL 0.11 m/s    PV PEAK VELOCITY 0.75 cm/s    LVIDd 5.42 3.5 - 6.0 cm    IVS 1.04 0.6 - 1.1 cm    Posterior Wall 0.43 (A) 0.6 - 1.1 cm    LVIDs 3.52 2.1 - 4.0 cm    FS 35 28 - 44 %    LA volume 44.61 cm3    LV mass 140.26 g    LA size 3.58 cm    Left Ventricle Relative Wall Thickness 0.16 cm    AV mean gradient 2 mmHg    AV valve area 3.28 cm2    AV Velocity Ratio 0.74     AV index (prosthetic) 0.76     MV mean gradient 1 mmHg    MV valve area p 1/2 method 3.88 cm2    MV valve area by continuity eq 3.40 cm2    E/A ratio 0.72     Mean e' 0.09 m/s    E wave deceleration time 195.55 msec    IVRT 115.34 msec    MV "A" wave duration [id number] msec    Pulm vein S/D ratio 1.20     LVOT diameter 2.34 cm    LVOT area 4.3 cm2    LVOT peak luther 0.78 m/s    LVOT peak VTI 15.50 cm    Ao peak luther 1.05 m/s    Ao VTI 20.3 cm    LVOT stroke volume 66.62 cm3    AV peak gradient 4 mmHg    MV peak gradient 2 mmHg    E/E' ratio 6.71 m/s    MV Peak E Luther 0.57 m/s    TR Max Luther 1.95 m/s    MV VTI 19.6 cm    MV stenosis pressure 1/2 time 56.71 ms    MV Peak A Luther 0.79 m/s    PV Peak S Luther 0.53 m/s    PV Peak D Luther 0.44 m/s    LV Systolic Volume 51.69 mL    LV Systolic Volume Index 23.7 mL/m2    LV Diastolic Volume 142.28 mL    LV Diastolic Volume Index 65.27 mL/m2    LA " Volume Index 20.5 mL/m2    LV Mass Index 64 g/m2    RA Major Axis 4.48 cm    Left Atrium Minor Axis 4.43 cm    Left Atrium Major Axis 4.20 cm    Triscuspid Valve Regurgitation Peak Gradient 15 mmHg    RA Width 2.40 cm    Right Atrial Pressure (from IVC) 3 mmHg    EF 50 %    TV resting pulmonary artery pressure 18 mmHg    Narrative    · The left ventricle is normal in size with low normal systolic function.  · The estimated ejection fraction is 50-55%.  · Normal right ventricular size with normal right ventricular systolic   function.  · Normal left ventricular diastolic function.  · The estimated PA systolic pressure is 18 mmHg.  · Normal central venous pressure (3 mmHg).  · Trivial pericardial effusion.       and EKG: reviewed.

## 2023-09-14 NOTE — PLAN OF CARE
Pt transferred to [id number] via wheelchair, no co of chest pain or sob, wife had his belongings, shirt, pants, underwear, shoes, pj bottoms placed on pt, wife also had her own bags

## 2023-09-14 NOTE — CONSULTS
[facility]/Critical Care Consult Note      Patient:Luis Mendez  Age:73 y.o.  MRN:[id number]  Admit date:9/13/2023   LOS:0 day(s)    Primary Attending: Dr. Ibrhaim  Consult Attending: Dr. Pearson  Consult Fellow: Dr. Woody  Resident: Dr. Baca     History of Present Illness:       Luis Mendez is a 73 y.o. male with PMHx of HTN, HLD, CAD who presented to [facility] on 9/13/2023 from outside ED for STEMI with a primary complaint of chest pain. Patient reports he had been having intermittent midsternal chest paint for the last 2 days. Pain worsened with exertion and improved with rest. His sxs continued to worsen so he presented to outside ED. Initial EKG with new onset 1st degree AV block and repeat EKG showing some ST elevations in inferior leads. Patient was transferred to [facility] for Cath lab and cardiology services. Overnight patient had heart cath with PCI and 2 VENU placed in proximal RCA and RPLV. This morning patient is feeling better and denies any chest pain, SOB, palpitations, N/V/D, ABD pain, fever or chills overnight. No other concerns this morning.      MEDICAL HISTORY:      Past Medical History:  Past Medical History:   Diagnosis Date    Hypertension     Prostate cancer         Past Surgical History:  Past Surgical History:   Procedure Laterality Date    APPENDECTOMY      COLONOSCOPY N/A 5/15/2018    Procedure: COLONOSCOPY;  Surgeon: Edison Amado Jr., MD;  Location: [facility];  Service: Endoscopy;  Laterality: N/A;    PROSTATE SURGERY           Family History:   Family History   Problem Relation Age of Onset    No Known Problems Mother     No Known Problems Father     No Known Problems Sister          Social History:   Alcohol use: denies  Tobacco use: quit 40 years ago  Recreational drug use: denies     Allergies:  Review of patient's allergies indicates:  No Known Allergies    Home Medications:  Current Outpatient Medications   Medication Instructions     amLODIPine (NORVASC) 10 mg, Oral, Daily    aspirin (ECOTRIN) 81 mg, Oral, Daily    atorvastatin (LIPITOR) 40 mg, Oral, Daily    carvedilol (COREG) 25 MG tablet TAKE 1 TABLET BY MOUTH TWICE DAILY    hydroCHLOROthiazide (HYDRODIURIL) 25 mg, Oral, Daily    losartan (COZAAR) 100 mg, Oral, Daily    pantoprazole (PROTONIX) 40 mg, Oral, 2 times daily    spironolactone (ALDACTONE) 25 mg, Oral, Daily    traMADoL (ULTRAM) 50 mg, Oral, 2 times daily PRN        OBJECTIVE DATA:      Intake/Output:    Intake/Output Summary (Last 24 hours) at 9/14/2023 0841  Last data filed at 9/14/2023 0645  Gross per 24 hour   Intake 808.58 ml   Output 750 ml   Net 58.58 ml     Net IO Since Admission: 58.58 mL [09/14/23 0841]     Physical Exam:  Temp:  [98 °F (36.7 °C)-98.5 °F (36.9 °C)] 98 °F (36.7 °C)  Pulse:  [61-75] 74  Resp:  [9-20] 20  SpO2:  [94 %-99 %] 96 %  BP: (129-193)/(76-97) 130/76  General: No acute distress, resting comfortably   HEENT: Atraumatic, normocephalic, moist mucous membranes  Cardiovascular: Regular rate and rhythm, normal S1 and S2, no extra heart sounds or murmurs appreciated   Chest wall: Non-tender to palpation, no gross deformities noted   Back: Non-tender to palpation, no abnormal curvature noted, symmetric rise with respiration   Respiratory: Stable on room air, non-labored breathing, no accessory muscle use noted, clear to auscultation bilaterally, no wheezes or crackles   Abdominal: Non-distended, soft, normoactive bowel sounds, non-tender to palpation, no rebound tenderness, no guarding, no organomegaly noted   Extremities: Atraumatic, non-edematous, moving all four extremities   Pulses: 2+ and symmetric radial artery, dorsalis pedis artery, posterior tibial artery  Skin: Non-diaphoretic, non-jaundice, intact  Neurologic: No gross focal neurologic deficits noted      Laboratory/Imaging:  Recent Labs   Lab 09/13/23  2157 09/14/23  0325   WBC 5.32 7.18  7.18   HGB 14.7 14.8  14.8   HCT 44.6 43.7  43.2   PLT  251 223  237    138  139   K 3.2* 3.2*  3.2*    103  104   CREATININE 1.30 1.3  1.3   BUN 18 15  16   CO2 27 23  25   ALT 49* 37  38   AST 40 23  24       Microbiology: Reviewed   none     Imaging: Reviewed   CXR with mild pulmonary edema    Medications:   aspirin  81 mg Oral Daily    atorvastatin  80 mg Oral Daily    carvediloL  25 mg Oral BID    hydroCHLOROthiazide  25 mg Oral Daily    losartan  100 mg Oral Daily    pantoprazole  40 mg Oral BID    ticagrelor  90 mg Oral BID         heparin (porcine) 1,500 Units/hr (09/14/23 0134)        acetaminophen, heparin (porcine)     Problem List:  Patient Active Problem List   Diagnosis    Screening for colorectal cancer    Essential hypertension    Mixed hyperlipidemia    Family history of diabetes mellitus (DM)    Dyspnea on exertion    Prostate cancer    Class 1 obesity due to excess calories with serious comorbidity and body mass index (BMI) of 33.0 to 33.9 in adult    Acute ST elevation myocardial infarction (STEMI) of inferior wall    GERD (gastroesophageal reflux disease)    Hypokalemia       Assessment/Plan:     Luis Mendez is a 73 y.o. male with a past medical history of HTN, HLD, who presented with STEMI. Cath lab activated and PCI performed by cardiology. Patient stable and doing well post-procedure.      Neuro  No acute issues     Cardiovascular  STEMI  - EKG at [facility] ED showing inferior MI, Troponin 0.039  - heparin bolus given, [facility]  - [facility] performed showing > 90% stenosis in proximal RCA, RPLV, LAD  - VENU placed to RCA and RPLV with resolution of stenosis   - continue ASA, ticagrelor, heparin, lipitor  - Prior TTE normal, repeat TTE pending   - keep on telemetry     HTN  - home regimen: amlodipine 10, HCTZ 25 mg, losartan 100 mg, aldactone 25 daily, coreg 25  - restarted on coreg, HCTZ, losaratan  - no acute issues   - can add back other anti-HTN as needed    HLD  - lipid panel: Chol 136,  LDL 88, HDL 40, Tri 36  - continue lipitor 80 daily with LDL goal <70    Respiratory  No acute issues    GI/FEN  GERD  - continue pantoprazole 40 mg BID   - no acute issues     F: completed NS IVF  E: Hypokalemia, hypophosphatemia  N: cardiac diet     Renal  Cr 1.3, appears at baseline    Hypokalemia  K 3.  Hypophosphatemia  - phos 2.2  - given phos-Nak    Heme  No acute issues    Infectious Disease  No acute issues    Endocrine  TSH 1.19, no acute issues    Feeding: cardiac diet  Analgesia: tylenol PRN  Sedation: none  Thrombo PPX: heparin  Head of Bed: 30  Ulcer PPX: PPI   Glucose: no acute issues  SBT/SAT: N/A   Bowels: N/A   Indwelling Lines: PIV   Deescalation Abx: none    Dispo: Step down to floor today   Code Status: Full     Thank you for allowing us to participate in the care of this patient. We will continue to follow.     Jhonathan Baca MD  [facility]  [facility]

## 2023-09-14 NOTE — PLAN OF CARE
Was dozing on and off, awake now, needed to catch up on his sleep, no co of chest pain or sob, wanted to get him up in a chair, but he said not now

## 2023-09-14 NOTE — ED NOTES
Per Mireya- unit arrived on scene 4 minutes ago.   Asked if dispatcher would call the unit due to patient having an active stemi and needs to be transferred to cath lab as soon as possible

## 2023-09-14 NOTE — EICU
"eICU Note : New Admit :notified by the [facility]: New Admission from the Cath lab    Brief HPI:  72 y/o With past medical history significant for hypertension, hyperlipidemia, obesity with BMI 33.85 kg  In the ED he was noted to have minimally elevated Troponins of 0.039 and was admitted with worsening chest pain.  STEMI code activated aspirin, Ticagrelor, heparin bolus, transferred to outside hospital and STEMI activated for emergent cath.    PMHx: Hypertension and prostate cancer  Surgical history significant for appendectomy, colonoscopy    Vital Signs :  Blood pressure (!) 164/88, pulse 72, temperature 98 °F (36.7 °C), temperature source Oral, resp. rate 15, height 5' 10" (1.778 m), weight 107 kg (235 lb 14.3 oz), SpO2 99 %.       Camera Assessment : Pt lying in bed in no apparent distress      Data:  WBC 7.18, hemoglobin 14.8, hematocrit 43.2, platelets 237  Sodium 139, potassium 3.2, chloride 103, CO2 27, anion gap 9, BUN 18, creatinine 1.3, EGFR 58.7, glucose 144, calcium 9.5, alkaline phosphatase 111, total protein 8.8, albumin 4.5  Echo: Normal LV Function    Impression and recommendations:  1.Acute St elevation MI : Antiplatelets , ASA, Ticagrelor , Heparin Bolus   2. Obesity due to excess calories, BMI 33-39   3.Mixed Hyperlipidemia .If CAD, then goal LDL < 70.    - start statin therapy w/ high intensity statin  - risk factor and lifestyle modifications   4.Essential Hypertension  : Continue home medications .  5. PUD, DVT prophylaxis : SCD and PPI       Rani Martinez M.D  eICU Physician   "

## 2023-09-14 NOTE — ASSESSMENT & PLAN NOTE
If CAD, then goal LDL < 70.    - start statin therapy w/ high intensity statin  - risk factor and lifestyle modifications

## 2023-09-14 NOTE — PLAN OF CARE
Report to Fany bianchi MD notified of phos 2.2 orders obtained    Up to side of bed, no co of dizziness, weakness, no chest pain or sob, no drop in bp, or inc in pulse, ambulated in room without any problems, then up in chair

## 2023-09-14 NOTE — PLAN OF CARE
VIRTUAL NURSE:  Labs, notes, orders, and careplan reviewed. VN to be available as needed.    Problem: Adult Inpatient Plan of Care  Goal: Plan of Care Review  9/14/2023 1837 by Lenore Alcantar RN  Outcome: Ongoing, Progressing  9/14/2023 1609 by Lenore Alcantar RN  Outcome: Ongoing, Progressing  Goal: Patient-Specific Goal (Individualized)  9/14/2023 1837 by Lenore Alcantar RN  Outcome: Ongoing, Progressing  9/14/2023 1609 by Lenore Alcantar RN  Outcome: Ongoing, Progressing  Goal: Absence of Hospital-Acquired Illness or Injury  9/14/2023 1837 by Lenore Alcantar RN  Outcome: Ongoing, Progressing  9/14/2023 1609 by Lenore Alcantar RN  Outcome: Ongoing, Progressing  Goal: Optimal Comfort and Wellbeing  9/14/2023 1837 by Lenore Alcantar RN  Outcome: Ongoing, Progressing  9/14/2023 1609 by Lenore Alcantar RN  Outcome: Ongoing, Progressing  Goal: Readiness for Transition of Care  9/14/2023 1837 by Lenore Alcantar RN  Outcome: Ongoing, Progressing  9/14/2023 1609 by Lenore Alcantar RN  Outcome: Ongoing, Progressing

## 2023-09-14 NOTE — PLAN OF CARE
No acute distress. VSS site right wrist dressing cdi with tegaderm/soft no s/s of hematoma or bleed. States occasional twinge of discomfort in chest 1/10. Pt denies CP at present.

## 2023-09-14 NOTE — ED NOTES
Pt aware of POC to be transferred STAT to [facility].   All questions answered. Pt and spouse verbalized understanding

## 2023-09-14 NOTE — CONSULTS
[place] - Cath Lab ([facility])  Cardiology  Consult Note    Patient Name: Luis Mendez  MRN: [id number]  Admission Date: 9/13/2023  Hospital Length of Stay: 0 days  Code Status: No Order   Attending Provider: No att. providers found   Consulting Provider: Moustapha Mitchell III, MD  Primary Care Physician: Karen Aragon MD  Principal Problem:<principal problem not specified>    Patient information was obtained from patient, past medical records and ER records.     Consults  Subjective:     Chief Complaint:  Chest pain     HPI:   Pt is a 74 yo M w/ PMH of HTN, HLD, and Class 1 obesity who presented to the ED w/ c/o cp 1-2 days.  In the ED, he was noted to have an initial negative ECG with a minimally elevated trop of 0.039 and was admitted to IM for further eval however 2 hrs into his ED visit developed worsening cp and repeat ECG noted mild ST elevations of the inferior leads.  STEMI code activated and was given ASA, ticagrelor, and heparin bolus and transferred to [facility] >1 hr after code STEMI activated for emergent for cath.        Past Medical History:   Diagnosis Date    Hypertension     Prostate cancer        Past Surgical History:   Procedure Laterality Date    APPENDECTOMY      COLONOSCOPY N/A 5/15/2018    Procedure: COLONOSCOPY;  Surgeon: Edison Amado Jr., MD;  Location: [facility];  Service: Endoscopy;  Laterality: N/A;    PROSTATE SURGERY         Review of patient's allergies indicates:  No Known Allergies    No current facility-administered medications on file prior to encounter.     Current Outpatient Medications on File Prior to Encounter   Medication Sig    amlodipine (NORVASC) 10 MG tablet Take 1 tablet (10 mg total) by mouth once daily.    aspirin (ECOTRIN) 81 MG EC tablet Take 1 tablet (81 mg total) by mouth once daily.    atorvastatin (LIPITOR) 40 MG tablet Take 40 mg by mouth once daily.    carvedilol (COREG) 25 MG tablet TAKE 1 TABLET BY MOUTH TWICE DAILY    hydroCHLOROthiazide  (HYDRODIURIL) 25 MG tablet Take 25 mg by mouth once daily.    losartan (COZAAR) 100 MG tablet Take 100 mg by mouth once daily.    pantoprazole (PROTONIX) 40 MG tablet Take 40 mg by mouth 2 (two) times daily.    spironolactone (ALDACTONE) 25 MG tablet Take 1 tablet (25 mg total) by mouth once daily.    traMADoL (ULTRAM) 50 mg tablet Take 50 mg by mouth 2 (two) times daily as needed.     Family History       Problem Relation (Age of Onset)    No Known Problems Mother, Father, Sister          Tobacco Use    Smoking status: Former    Smokeless tobacco: Never   Substance and Sexual Activity    Alcohol use: No    Drug use: No    Sexual activity: Not on file     Review of Systems   Constitutional: Negative for diaphoresis and fever.   HENT:  Negative for congestion and hearing loss.    Eyes:  Negative for blurred vision and pain.   Cardiovascular:  Positive for chest pain and dyspnea on exertion. Negative for claudication, leg swelling, near-syncope, palpitations and syncope.   Respiratory:  Negative for shortness of breath and sleep disturbances due to breathing.    Hematologic/Lymphatic: Negative for bleeding problem. Does not bruise/bleed easily.   Skin:  Negative for color change and poor wound healing.   Gastrointestinal:  Negative for abdominal pain and nausea.   Genitourinary:  Negative for bladder incontinence and flank pain.   Neurological:  Negative for focal weakness and light-headedness.     Objective:     Vital Signs (Most Recent):  Temp: 98.5 °F (36.9 °C) (09/13/23 2119)  Pulse: 66 (09/14/23 0003)  Resp: 19 (09/14/23 0003)  BP: (!) 184/91 (09/14/23 0014)  SpO2: 99 % (09/14/23 0003) Vital Signs (24h Range):  Temp:  [98.5 °F (36.9 °C)] 98.5 °F (36.9 °C)  Pulse:  [61-71] 66  Resp:  [15-20] 19  SpO2:  [98 %-99 %] 99 %  BP: (181-193)/(91-97) 184/91     Weight: 106.6 kg (235 lb)  Body mass index is 33.72 kg/m².    SpO2: 99 %       No intake or output data in the 24 hours ending 09/14/23  0045    Lines/Drains/Airways       Peripheral Intravenous Line  Duration                  Peripheral IV - Single Lumen 09/13/23 2349 20 G Anterior;Right Wrist <1 day         Peripheral IV - Single Lumen 09/14/23 0001 20 G Left Antecubital <1 day                     Physical Exam  Constitutional:       Appearance: He is well-developed. He is not diaphoretic.   HENT:      Head: Normocephalic and atraumatic.   Eyes:      General: No scleral icterus.     Pupils: Pupils are equal, round, and reactive to light.   Neck:      Vascular: No JVD.   Cardiovascular:      Rate and Rhythm: Normal rate and regular rhythm.      Pulses: Intact distal pulses.      Heart sounds: S1 normal and S2 normal. No murmur heard.     No friction rub. No gallop.   Pulmonary:      Effort: Pulmonary effort is normal. No respiratory distress.      Breath sounds: Normal breath sounds. No wheezing or rales.   Chest:      Chest wall: No tenderness.   Abdominal:      General: Bowel sounds are normal. There is no distension.      Palpations: Abdomen is soft. There is no mass.      Tenderness: There is no abdominal tenderness. There is no rebound.   Musculoskeletal:         General: No tenderness. Normal range of motion.      Cervical back: Normal range of motion and neck supple.      Right lower leg: No edema.      Left lower leg: No edema.   Skin:     General: Skin is warm and dry.      Coloration: Skin is not pale.   Neurological:      Mental Status: He is alert and oriented to person, place, and time.      Coordination: Coordination normal.      Deep Tendon Reflexes: Reflexes normal.   Psychiatric:         Behavior: Behavior normal.         Judgment: Judgment normal.          Significant Labs: BMP:   Recent Labs   Lab 09/13/23 2157   *      K 3.2*      CO2 27   BUN 18   CREATININE 1.30   CALCIUM 9.5   , CMP   Recent Labs   Lab 09/13/23 2157      K 3.2*      CO2 27   *   BUN 18   CREATININE 1.30   CALCIUM 9.5  "  PROT 8.8*   ALBUMIN 4.5   BILITOT 0.7   ALKPHOS 111   AST 40   ALT 49*   ANIONGAP 9   , CBC   Recent Labs   Lab 09/13/23  2157   WBC 5.32   HGB 14.7   HCT 44.6      , INR   Recent Labs   Lab 09/14/23  0015   INR 1.0   , Lipid Panel No results for input(s): "CHOL", "HDL", "LDLCALC", "TRIG", "CHOLHDL" in the last 48 hours., Troponin   Recent Labs   Lab 09/13/23  2157   TROPONINI 0.039*   , and All pertinent lab results from the last 24 hours have been reviewed.    Significant Imaging: Echocardiogram: 2D echo with color flow doppler:   Results for orders placed or performed during the hospital encounter of 08/10/18   Echo doppler color flow   Result Value Ref Range    EF + QEF 55 55 - 65    Diastolic Dysfunction No     Mitral Valve Mobility NORMAL     Tricuspid Valve Regurgitation TRIVIAL     Narrative    Date of Procedure: 08/10/2018        TEST DESCRIPTION   Technical Quality: This is a technically adequate study.     Aorta: The aortic root is normal in size. Aortic root measures 3.2 cm at Sinuses of Valsalva.     Left Atrium: The left atrial volume index is normal, measuring 21.75 cc/m2.     Left Ventricle: The left ventricle is normal in size, with an end-diastolic diameter of 5.2 cm, and an end-systolic diameter of 4.0 cm. LV wall thickness is normal, with the septum measuring 1.1 cm and the posterior wall measuring 1.0 cm across. Relative   wall thickness was normal at 0.38, and the LV mass index was 108.3 g/m2 consistent with normal left ventricular mass. There are no regional wall motion abnormalities. Left ventricular systolic function appears normal. Visually estimated ejection   fraction is 55-60%. The LV Doppler derived stroke volume equals 93.0 ccs.     Diastolic indices: E wave velocity 0.6 m/s, E/A ratio 0.9,  msec., E/e' ratio(avg) 6. Diastolic function is normal.     Right Atrium: The right atrium is normal in size, measuring 4.2 cm in length and 3.0 cm in width in the apical view. "     Right Ventricle: The right ventricle is normal in size measuring 3.0 cm at the base in the apical right ventricle-focused view. Global right ventricular systolic function appears normal. Tricuspid annular plane systolic excursion (TAPSE) is 2.7 cm.     Aortic Valve:  The aortic valve is normal in structure with normal leaflet mobility. The aortic valve is tri-leaflet in structure.     Mitral Valve:  The mitral valve is normal in structure with normal leaflet mobility. The pressure half time is 64 msec. The calculated mitral valve area is 3.44 cm2.     Tricuspid Valve:  The tricuspid valve is normal in structure with normal leaflet mobility. There is trivial tricuspid regurgitation.     Pulmonary Valve:  The pulmonic valve is not well seen.     IVC: IVC is normal in size and collapses > 50% with a sniff, suggesting normal right atrial pressure of 3 mmHg.     Atrial Septum: The atrial septum is intact.     Intracavitary: There is no evidence of pericardial effusion, intracavity mass, thrombi, or vegetation.         CONCLUSIONS     1 - Normal left ventricular systolic function (EF 55-60%).     2 - Normal left ventricular diastolic function.     3 - Normal right ventricular systolic function .             This document has been electronically    SIGNED BY: Eladio Domínguez MD On: 08/10/2018 12:12    and Transthoracic echo (TTE) complete (Cupid Only):   Results for orders placed or performed during the hospital encounter of 01/18/23   Echo   Result Value Ref Range    BSA 2.23 m2    TDI SEPTAL 0.06 m/s    LV LATERAL E/E' RATIO 5.18 m/s    LV SEPTAL E/E' RATIO 9.50 m/s    LA WIDTH 3.40 cm    IVC diameter 1.95 cm    Left Ventricular Outflow Tract Mean Velocity 0.54 cm/s    Left Ventricular Outflow Tract Mean Gradient 1.29 mmHg    TDI LATERAL 0.11 m/s    PV PEAK VELOCITY 0.75 cm/s    LVIDd 5.42 3.5 - 6.0 cm    IVS 1.04 0.6 - 1.1 cm    Posterior Wall 0.43 (A) 0.6 - 1.1 cm    LVIDs 3.52 2.1 - 4.0 cm    FS 35 28 - 44 %    LA  "volume 44.61 cm3    LV mass 140.26 g    LA size 3.58 cm    Left Ventricle Relative Wall Thickness 0.16 cm    AV mean gradient 2 mmHg    AV valve area 3.28 cm2    AV Velocity Ratio 0.74     AV index (prosthetic) 0.76     MV mean gradient 1 mmHg    MV valve area p 1/2 method 3.88 cm2    MV valve area by continuity eq 3.40 cm2    E/A ratio 0.72     Mean e' 0.09 m/s    E wave deceleration time 195.55 msec    IVRT 115.34 msec    MV "A" wave duration [id number] msec    Pulm vein S/D ratio 1.20     LVOT diameter 2.34 cm    LVOT area 4.3 cm2    LVOT peak luther 0.78 m/s    LVOT peak VTI 15.50 cm    Ao peak luther 1.05 m/s    Ao VTI 20.3 cm    LVOT stroke volume 66.62 cm3    AV peak gradient 4 mmHg    MV peak gradient 2 mmHg    E/E' ratio 6.71 m/s    MV Peak E Luther 0.57 m/s    TR Max Luther 1.95 m/s    MV VTI 19.6 cm    MV stenosis pressure 1/2 time 56.71 ms    MV Peak A Luther 0.79 m/s    PV Peak S Luther 0.53 m/s    PV Peak D Luther 0.44 m/s    LV Systolic Volume 51.69 mL    LV Systolic Volume Index 23.7 mL/m2    LV Diastolic Volume 142.28 mL    LV Diastolic Volume Index 65.27 mL/m2    LA Volume Index 20.5 mL/m2    LV Mass Index 64 g/m2    RA Major Axis 4.48 cm    Left Atrium Minor Axis 4.43 cm    Left Atrium Major Axis 4.20 cm    Triscuspid Valve Regurgitation Peak Gradient 15 mmHg    RA Width 2.40 cm    Right Atrial Pressure (from IVC) 3 mmHg    EF 50 %    TV resting pulmonary artery pressure 18 mmHg    Narrative    · The left ventricle is normal in size with low normal systolic function.  · The estimated ejection fraction is 50-55%.  · Normal right ventricular size with normal right ventricular systolic   function.  · Normal left ventricular diastolic function.  · The estimated PA systolic pressure is 18 mmHg.  · Normal central venous pressure (3 mmHg).  · Trivial pericardial effusion.       and EKG: reviewed.      Assessment and Plan:     Acute ST elevation myocardial infarction (STEMI) of inferior wall  Initial ECG negative and " repeat ECG w/ increase in cp noted mild ST elevations inferiorly.    - to cath lab emergently   1. Cardiac catheterization with probable PCI.   2. Antiplatelets: ASA, ticagrelor, and heparin bolus   3. Access: R radial 5/6 Fr sheath   4. Catheters: 5 Fr tig  5. Pt is a VENU candidate and understands the importance of taking plavix for at least one year in ACS cases and 6 months in stable CAD. The patient understands that in case of receiving a drug coated stent the failure to comply with dual anti-platelet therapy as prescribed is likely to result in stent clothing, heart attack and death.   6. The risks, benefits, and alternatives of coronary vascular angiography and possible intervention were discussed with the patient. All questions were answered and informed consent was obtained. I had a detailed discussion with the patient regarding risk of stroke, MI, bleeding access site complications including limb loss, allergy, kidney failure including dialysis and death.  7. The patient understands the risks and benefits and wishes to go ahead with the procedure.  8. CSHA Clinical Frailty Scale: Managing well  9. All patient's questions were answered  10. Patient denies personal and family history of anesthesia reactions in past.        Class 1 obesity due to excess calories with serious comorbidity and body mass index (BMI) of 33.0 to 33.9 in adult  Encourage lifestyle modifications (diet, exercise, and weight loss).      Mixed hyperlipidemia  If CAD, then goal LDL < 70.    - start statin therapy w/ high intensity statin  - risk factor and lifestyle modifications     Essential hypertension  Goal BP < 130/80.    - continue home meds as appropriate  - risk factor and lifestyle modifications         VTE Risk Mitigation (From admission, onward)         Ordered     heparin 25,000 units in dextrose 5% (100 units/ml) IV bolus from bag - ADDITIONAL PRN BOLUS - 60 units/kg (max bolus 4000 units)  As needed (PRN)        Question:   Heparin Infusion Adjustment (DO NOT MODIFY ANSWER)  Answer:  [email or web address]\epic\Images\Pharmacy\HeparinInfusions\heparin LOW INTENSITY nomogram for [facility] [id number]    09/14/23 0007     heparin 25,000 units in dextrose 5% (100 units/ml) IV bolus from bag - ADDITIONAL PRN BOLUS - 30 units/kg (max bolus 4000 units)  As needed (PRN)        Question:  Heparin Infusion Adjustment (DO NOT MODIFY ANSWER)  Answer:  [email or web address]\Images\Pharmacy\HeparinInfusions\heparin LOW INTENSITY nomogram for [facility] [id number].pdf    09/14/23 0007     heparin 25,000 units in dextrose 5% 250 mL (100 units/mL) infusion LOW INTENSITY nomogram - [facility]  Continuous        Question Answer Comment   Heparin Infusion Adjustment (DO NOT MODIFY ANSWER) [email or web address]\epic\Images\Pharmacy\HeparinInfusions\heparin LOW INTENSITY nomogram for [facility] [id number]    Begin at (in units/kg/hr) 12        09/14/23 0007                Thank you for your consult. I will follow-up with patient. Please contact us if you have any additional questions.    Moustapha Mitchell III, MD  Cardiology   [place] - Cath Lab ([facility])

## 2023-09-14 NOTE — ASSESSMENT & PLAN NOTE
Initial ECG negative and repeat ECG w/ increase in cp noted mild ST elevations inferiorly.    - to cath lab emergently   1. Cardiac catheterization with probable PCI.   2. Antiplatelets: ASA, ticagrelor, and heparin bolus   3. Access: R radial 5/6 Fr sheath   4. Catheters: 5 Fr tig  5. Pt is a VENU candidate and understands the importance of taking plavix for at least one year in ACS cases and 6 months in stable CAD. The patient understands that in case of receiving a drug coated stent the failure to comply with dual anti-platelet therapy as prescribed is likely to result in stent clothing, heart attack and death.   6. The risks, benefits, and alternatives of coronary vascular angiography and possible intervention were discussed with the patient. All questions were answered and informed consent was obtained. I had a detailed discussion with the patient regarding risk of stroke, MI, bleeding access site complications including limb loss, allergy, kidney failure including dialysis and death.  7. The patient understands the risks and benefits and wishes to go ahead with the procedure.  8. CSHA Clinical Frailty Scale: Managing well  9. All patient's questions were answered  10. Patient denies personal and family history of anesthesia reactions in past.

## 2023-09-14 NOTE — NURSING
Pt received from cath lab s/p angiogram with stents to proximal RCA x 2. Alert calm. VS wnl. Denies CP . Vasc band to right wrist in place with no bleeding or swelling. Bed alarm on for safety. Frequent checks in progress. Wife to bedside as well.

## 2023-09-14 NOTE — HPI
Pt is a 72 yo M w/ PMH of HTN, HLD, and Class 1 obesity who presented to the ED w/ c/o cp 1-2 days.  In the ED, he was noted to have an initial negative ECG with a minimally elevated trop of 0.039 and was admitted to IM for further eval however 2 hrs into his ED visit developed worsening cp and repeat ECG noted mild ST elevations of the inferior leads.  STEMI code activated and was given ASA, ticagrelor, and heparin bolus and [place] [facility] [place] code STEMI activated for emergent for cath.

## 2023-09-14 NOTE — CARE UPDATE
Patient presented with STEMI with emergent LHC with successful RCA/PLV stent. Admitted to ICU. BP initially 150s-160s and trended down with oral regimen. Remains chest pain free. Echo with normal LVEF. HR stable. No arrhythmias noted on telemetry. Continue GDMT and suitable for transfer to the floor. If remains stable overnight and able to ambulate with no issues. Anticipate discharge tomorrow

## 2023-09-14 NOTE — PLAN OF CARE
No co  of chest pain, sob, instructed to notify nurse of any chest pain or sob, also if notices any bleeding from the puncture rt wrist to apply pressure and notify nurse, understands, also instructed not to move rt arm and hand too much, leave at bedside , and can not lift more than 10 pds rt puncture site intact, dressing dry and intact, no evidence hematoma, soft

## 2023-09-14 NOTE — H&P
[facility] Medicine H&P Note     Admitting Team: [facility] Hospitalist Team B  Attending Physician: Elias Ibrahim MD  Resident: Mario  Intern: Dutch    Date of Admit: 9/13/2023    Chief Complaint     Chest pain x 1 day    Subjective:      History of Present Illness:  Luis Mendez is a 73 y.o. M with a PMH of HTN, GERD, and prostate cancer who presented to [facility] ED for midsternal chest pain that began intermittently yesterday and worsened today. Tried taking an antacid and pepto bismo with no relief. In the ED was given a GI cocktail with no improvement. Troponin elevated to 39 and EKG showing new 1st degree AV block so requested to transfer to [facility] for ACS workup and assistance w/finding a new cardiologist as his prior one left. However, a few hours later pt developed worsening CP and a new EKG showed ST elevations in inferior leads. Pt was STEMI activated and sent to [facility] for the cath lab.     Pt evaluated after cath procedure. Drowsy but arousable. Tolerated procedure well. Denies current CP, headaches, N/V, SOB, or abd pain.       Past Medical History:  HTN  GERD  GISELA  Hx Left Breast Mass, nonmalignant  Prostate cx s/p resection    Past Surgical History:  Past Surgical History:   Procedure Laterality Date    APPENDECTOMY      COLONOSCOPY N/A 5/15/2018    Procedure: COLONOSCOPY;  Surgeon: Edison Amado Jr., MD;  Location: [facility];  Service: Endoscopy;  Laterality: N/A;    PROSTATE SURGERY         Allergies:  Review of patient's allergies indicates:  No Known Allergies    Home Medications:  Prior to Admission medications    Medication Sig Start Date End Date Taking? Authorizing Provider   amlodipine (NORVASC) 10 MG tablet Take 1 tablet (10 mg total) by mouth once daily. 5/17/17 2/8/23  Anthony Escobedo MD   aspirin (ECOTRIN) 81 MG EC tablet Take 1 tablet (81 mg total) by mouth once daily. 11/7/18   Bowen Rehman MD   atorvastatin (LIPITOR) 40 MG tablet Take 40 mg by mouth once daily.     "Provider, Historical   carvedilol (COREG) 25 MG tablet TAKE 1 TABLET BY MOUTH TWICE DAILY 10/29/19   Bowen Rehman MD   hydroCHLOROthiazide (HYDRODIURIL) 25 MG tablet Take 25 mg by mouth once daily. 22   Provider, Historical   losartan (COZAAR) 100 MG tablet Take 100 mg by mouth once daily. 22   Provider, Historical   pantoprazole (PROTONIX) 40 MG tablet Take 40 mg by mouth 2 (two) times daily. 22   Provider, Historical   spironolactone (ALDACTONE) 25 MG tablet Take 1 tablet (25 mg total) by mouth once daily. 18  Bowen Rehman MD   traMADoL (ULTRAM) 50 mg tablet Take 50 mg by mouth 2 (two) times daily as needed. 22   Provider, Historical       Family History:  Family History   Problem Relation Age of Onset    No Known Problems Mother     No Known Problems Father     No Known Problems Sister        Social History:  Tobacco: quit 40 years ago  Alcohol: denies  Drugs: denies    Review of Systems:  All other systems are reviewed and are negative.      Health Maintaince :   Primary Care Physician: Margo    Immunizations:   TDap not UTD    Flu not UTD  Pna not UTD    Cancer Screening:  Colonoscopy: , due   Chest CT scan: none     Objective:   Last 24 Hour Vital Signs:  BP  Min: 164/88  Max: 193/97  Temp  Av.3 °F (36.8 °C)  Min: 98 °F (36.7 °C)  Max: 98.5 °F (36.9 °C)  Pulse  Av.6  Min: 61  Max: 72  Resp  Av.4  Min: 15  Max: 20  SpO2  Av.8 %  Min: 98 %  Max: 99 %  Height  Av' 10" (177.8 cm)  Min: 5' 10" (177.8 cm)  Max: 5' 10" (177.8 cm)  Weight  Av.8 kg (235 lb 7.1 oz)  Min: 106.6 kg (235 lb)  Max: 107 kg (235 lb 14.3 oz)  Body mass index is 33.85 kg/m².  No intake/output data recorded.    Physical Examination:    Constitutional: cooperative, calm, appears stated age.  HEENT: NCAT, PERRL, MMM  Neck: Supple, no masses, trachea not deviated.  Resp: Symmetrical, clear to auscultation bilaterally, No wheezes, rhonchi, or crackles.   Cardio: " "RRR, S1 and S2 normal, no murmurs noted  GI: Soft, non-tender, bowel sounds active.  Extremities: No edema, peripheral pulses 2+ and symmetric, extremities warm.  Skin: No rashes, bruises, or lesions.    Neurologic: Alert and oriented x3, follows commands, moves extremities spontaneously. No focal deficits.        Laboratory:  Most Recent Data:  CBC:   Lab Results   Component Value Date    WBC 5.32 09/13/2023    HGB 14.7 09/13/2023    HCT 44.6 09/13/2023     09/13/2023    MCV 91 09/13/2023    RDW 12.1 09/13/2023       BMP:   Lab Results   Component Value Date     09/13/2023    K 3.2 (L) 09/13/2023     09/13/2023    CO2 27 09/13/2023    BUN 18 09/13/2023    CREATININE 1.30 09/13/2023     (H) 09/13/2023    CALCIUM 9.5 09/13/2023     LFTs:   Lab Results   Component Value Date    PROT 8.8 (H) 09/13/2023    ALBUMIN 4.5 09/13/2023    BILITOT 0.7 09/13/2023    AST 40 09/13/2023    ALKPHOS 111 09/13/2023    ALT 49 (H) 09/13/2023     Coags:   Lab Results   Component Value Date    INR 1.0 09/14/2023     FLP:   Lab Results   Component Value Date    CHOL 132 01/18/2023    HDL 42 01/18/2023    LDLCALC 79.4 01/18/2023    TRIG 53 01/18/2023    CHOLHDL 31.8 01/18/2023     DM:   Lab Results   Component Value Date    LDLCALC 79.4 01/18/2023    CREATININE 1.30 09/13/2023     Thyroid:   Lab Results   Component Value Date    TSH 1.150 10/18/2021     Anemia: No results found for: "IRON", "TIBC", "FERRITIN", "[id number]", "FOLATE"  Cardiac:   Lab Results   Component Value Date    TROPONINI 0.035 (H) 09/14/2023    BNP <10 08/12/2018     Urinalysis:   Lab Results   Component Value Date    COLORU Alice 12/17/2021    SPECGRAV 1.015 12/17/2021    NITRITE Negative 12/17/2021    KETONESU Negative 12/17/2021    UROBILINOGEN Negative 12/17/2021    WBCUA 3 12/17/2021       Microbiology Data:  none    Other Results:  EKG (my interpretation): ST elevations in inferior leads (not in chart)    Radiology:  Imaging Results       "        X-Ray Chest AP Portable (Final result)  Result time 09/13/23 22:05:23      Final result by Shan Edmondson MD (09/13/23 22:05:23)                   Impression:      As above      Electronically signed by: Shan Edmondson  Date:    09/13/2023  Time:    22:05               Narrative:    EXAMINATION:  XR CHEST AP PORTABLE    CLINICAL HISTORY:  Chest Pain;    TECHNIQUE:  Single frontal view of the chest was performed.    COMPARISON:  None    FINDINGS:  Top-normal cardiac silhouette.  Mild perihilar interstitial opacities suggestive of pulmonary edema.  Recommend correlation to low-grade CHF.  Atypical infectious process not excluded.    Bones are intact.                                    Angiogram 9/14    There was two vessel coronary artery disease with 90% tubular stenosis of the prox RCA and 100% thrombotic stenois of the prox RPLV (culprit) and 95% discrete stenosis of the distal LAD.    Successful PCI of the prox RCA with a 4.5x16 mm VENU and of the prox RPLV with a 2.75x16 mm VENU with excellent angiographic results.    The left ventricular end diastolic pressure was elevated, 20 mmHg.    The RPDA lesion was 70% stenosed.    The Prox RCA lesion was 90% stenosed with 0% stenosis post-intervention.    The Dist LAD lesion was 95% stenosed.    The RPAV lesion was 100% stenosed with 0% stenosis post-intervention.    Prox RCA lesion: A SYS STENT MEGATRON 16X4.5MM stent was successfully placed at 18 YAMILE for 20 sec.    RPAV lesion: A STENT SYNERGY XD [id number] stent was successfully placed at 14 YAMILE for 10 sec.    The estimated blood loss was <50 mL.        Assessment:     Luis Mendez is a 73 y.o. M with a PMH of HTN, GERD, GISELA, and prostate cancer that presented w/CP and found to have a STEMI so urgently sent here for an angiogram. Found to have 2 vessel CAD now s/p 2 PCIs. Admitted to the ICU for close monitoring.     Plan:     STEMI  - Prior TTE 1/2023: EF wnl, no diastolic dysfunction, trivial pericardial  effusion. Prior NM Stress Test 2018 wnl.  - Troponins stable, peaked at 39  -  Initial EKG w/1st degree AV block and then repeat had new ST elevations in inferior leads (not uploaded in system)  - Given ASA, ticagrelor, and heparin bolus in the ED  - Underwent urgent RHC 9/14  - TTE ordered  - Needs new cardiologist since his prior cardiologist left the system    CAD s/p 2 PCIs  - 2 vessel CAD, angiogram performed 9/14 and 2 PCIs placed  - Lipid panel: Chol 136  TG 36  LDL 88.8  HDL 40  - LDL goal <70  - On statin 80 and DAPT therapy    HTN  - Home regimen: losartan/HCTZ 100/25, coreg 25 BID, aldactone 25, norvasc 10  - BP elevated at 160/87  - Restarted on coreg, losartan, and HCTZ    Hypokalemia  Hypophosphatemia  - Phos 2.2, K 3.2  - Given 60meq Kcl and ordered Phos-Nak repletion    GERD  - Cont home pantoprazole 40 BID    Healthcare Maintenance  - Polyps found on C-scope 2018, due 2012  - TSH 1.19, A1c 5.7    Diet: cardiac  DVT: SCDs    Dispo: Pending close monitoring post PCI placement    Code Status:     Full    Shailesh Ocampo MD  [facility] HO-II  [facility] Team B    [facility] Hospitalist Pager numbers:   [facility] Hospitalist Medicine Team A (Phan/Michelle): [phone]  [facility] Hospitalist Medicine Team B (Patrice/Moy):  [phone]

## 2023-09-14 NOTE — PLAN OF CARE
The sw met with the pt and Nesha Mendez(wife)[phone] and various other family members in the room to complete the assessment. The pt lives in [place] with his spouse. The pt has a very supportive family. The pt still drives but his wife will transport him home at d/c. The pt's independent with his ADL's and doesn't use dme. The pt's employed as an AC Technician. The sw completed the white board in the pt's room with her name and contact info. The sw encouraged them to call if they have any further questions or concerns. The sw will continue to follow the pt throughout his transitions of care and will assist with any d/c needs.        09/14/23 1131   Discharge Assessment   Assessment Type Discharge Planning Assessment   Confirmed/corrected address, phone number and insurance Yes   Confirmed Demographics Correct on Facesheet   Source of Information patient   When was your last doctors appointment?   (last month)   Communicated ALEX with patient/caregiver Yes   Reason For Admission Acute NSTEMI   People in Home spouse   Do you expect to return to your current living situation? Yes   Do you have help at home or someone to help you manage your care at home? Yes   Who are your caregiver(s) and their phone number(s)? Nesha Mendez(wife)[phone]   Prior to hospitilization cognitive status: Alert/Oriented   Current cognitive status: Alert/Oriented   Home Accessibility wheelchair accessible   Home Layout Able to live on 1st floor   Equipment Currently Used at Home none   Readmission within 30 days? No   Patient currently being followed by outpatient case management? No   Do you currently have service(s) that help you manage your care at home? No   Do you take prescription medications? Yes   Do you have prescription coverage? Yes   Coverage PHN   Do you have any problems affording any of your prescribed medications? No   Is the patient taking medications as prescribed? yes   Who is going to help you get home at discharge? Nesha  Andrea(wife)[phone]   How do you get to doctors appointments? car, drives self   Are you on dialysis? No   Do you take coumadin? No   DME Needed Upon Discharge  none   Discharge Plan discussed with: Spouse/sig other;Adult children;Patient   Name(s) and Number(s) Nesha Mendez(wife)[phone]   Transition of Care Barriers None   Discharge Plan A Home with family   Discharge Plan B Home Health   Physical Activity   On average, how many days per week do you engage in moderate to strenuous exercise (like a brisk walk)? 2 days   On average, how many minutes do you engage in exercise at this level? 30 min   Financial Resource Strain   How hard is it for you to pay for the very basics like food, housing, medical care, and heating? Not hard   Housing Stability   In the last 12 months, was there a time when you were not able to pay the mortgage or rent on time? N   In the last 12 months, how many places have you lived? 1   In the last 12 months, was there a time when you did not have a steady place to sleep or slept in a shelter (including now)? N   Transportation Needs   In the past 12 months, has lack of transportation kept you from medical appointments or from getting medications? no   In the past 12 months, has lack of transportation kept you from meetings, work, or from getting things needed for daily living? No   Food Insecurity   Within the past 12 months, you worried that your food would run out before you got the money to buy more. Never true   Within the past 12 months, the food you bought just didn't last and you didn't have money to get more. Never true   Social Connections   In a typical week, how many times do you talk on the phone with family, friends, or neighbors? More than 3   How often do you get together with friends or relatives? More than 3   Are you , , , , never , or living with a partner?    OTHER   Name(s) of People in Home Nesha Mendez(wife)[phone]

## 2023-09-14 NOTE — ED PROVIDER NOTES
ED Provider Note - 9/13/2023    History     Chief Complaint   Patient presents with    Heartburn     Pt reports heart burn mid chest starting yesterday intermitt and worsened today. Reports dose of antacid and pepto today with no relief. Denies sob denies chest pain.       Luis Mendez is a 73 y.o. year old male with past medical and surgical history as seen below, presenting with chief complaint of substernal epigastric pain.  Feels similar to heartburn but has been more persistent.  Ongoing for the past several hours but did feel some yesterday intermittently.  Pepto-Bismol attempted without relief.  Also took antacid without improvement of symptoms.  Has not had any shortness of breath, fever, nausea, vomiting.    Past Medical History:   Diagnosis Date    Hypertension     Prostate cancer      Past Surgical History:   Procedure Laterality Date    APPENDECTOMY      COLONOSCOPY N/A 5/15/2018    Procedure: COLONOSCOPY;  Surgeon: Edison Amado Jr., MD;  Location: [facility];  Service: Endoscopy;  Laterality: N/A;    PROSTATE SURGERY           Family History   Problem Relation Age of Onset    No Known Problems Mother     No Known Problems Father     No Known Problems Sister      Social History     Tobacco Use    Smoking status: Former    Smokeless tobacco: Never   Substance Use Topics    Alcohol use: No    Drug use: No     Social Determinants of Health with Concerns     Tobacco Use: Medium Risk (12/31/2022)    Patient History     Smoking Tobacco Use: Former     Smokeless Tobacco Use: Never     Passive Exposure: Not on file   Alcohol Use: Not on file   Financial Resource Strain: Not on file   Food Insecurity: Not on file   Transportation Needs: Not on file   Physical Activity: Not on file   Stress: Not on file   Social Connections: Not on file   Housing Stability: Not on file   Depression: Not on file      Review of patient's allergies indicates:  No Known Allergies    Review of Systems     A full Review of  Systems (ROS) was performed and was negative unless otherwise stated in the HPI.      Physical Exam     Vitals:    09/13/23 2146 09/13/23 2349 09/14/23 0003 09/14/23 0014   BP:   (!) 181/96 (!) 184/91   BP Location:       Patient Position:       Pulse: 68 61 66    Resp: 20 15 19    Temp:       SpO2: 99% 98% 99%    Weight:       Height:            Physical Exam    Nursing note and vitals reviewed.  Constitutional: He appears well-developed and well-nourished. No distress.   HENT:   Head: Normocephalic and atraumatic.   Right Ear: External ear normal.   Left Ear: External ear normal.   Nose: Nose normal.   Mouth/Throat: Oropharynx is clear and moist.   Eyes: Conjunctivae and EOM are normal. Pupils are equal, round, and reactive to light.   Neck: Neck supple.   Normal range of motion.  Cardiovascular:  Normal rate and regular rhythm.           No murmur heard.  Pulmonary/Chest: Breath sounds normal. No stridor. No respiratory distress. He has no wheezes. He has no rhonchi. He has no rales.   Abdominal: Abdomen is soft. Bowel sounds are normal. There is no abdominal tenderness.   Musculoskeletal:         General: No edema. Normal range of motion.      Cervical back: Normal range of motion and neck supple.     Neurological: He is alert and oriented to person, place, and time. He has normal strength. No cranial nerve deficit or sensory deficit.   Skin: Skin is warm and dry. No rash noted.   Psychiatric: He has a normal mood and affect. Thought content normal.       Lab Results- Independently reviewed by myself      Labs Reviewed   CBC W/ AUTO DIFFERENTIAL - Abnormal; Notable for the following components:       Result Value    Gran % 36.6 (*)     All other components within normal limits   COMPREHENSIVE METABOLIC PANEL - Abnormal; Notable for the following components:    Potassium 3.2 (*)     Glucose 144 (*)     Total Protein 8.8 (*)     ALT 49 (*)     eGFR 58.7 (*)     All other components within normal limits   TROPONIN  I - Abnormal; Notable for the following components:    Troponin I 0.039 (*)     All other components within normal limits   TROPONIN I - Abnormal; Notable for the following components:    Troponin I 0.035 (*)     All other components within normal limits    Narrative:     Draw baseline aPTT prior to starting the heparin bolus or  infusion  (if patient is on warfarin prior to heparin therapy)   APTT    Narrative:     Draw baseline aPTT prior to starting the heparin bolus or  infusion  (if patient is on warfarin prior to heparin therapy)   PROTIME-INR    Narrative:     Draw baseline aPTT prior to starting the heparin bolus or  infusion  (if patient is on warfarin prior to heparin therapy)   CBC W/ AUTO DIFFERENTIAL   COMPREHENSIVE METABOLIC PANEL   CBC W/ AUTO DIFFERENTIAL   TYPE & SCREEN           Imaging     Imaging Results              X-Ray Chest AP Portable (Final result)  Result time 09/13/23 22:05:23      Final result by Shan Edmondson MD (09/13/23 22:05:23)                   Impression:      As above      Electronically signed by: Shan Edmondson  Date:    09/13/2023  Time:    22:05               Narrative:    EXAMINATION:  XR CHEST AP PORTABLE    CLINICAL HISTORY:  Chest Pain;    TECHNIQUE:  Single frontal view of the chest was performed.    COMPARISON:  None    FINDINGS:  Top-normal cardiac silhouette.  Mild perihilar interstitial opacities suggestive of pulmonary edema.  Recommend correlation to low-grade CHF.  Atypical infectious process not excluded.    Bones are intact.                                    X-Rays:   Independently Interpreted Readings:   Chest X-Ray: No acute abnormalities.            ED Course         Critical Care    Date/Time: 9/14/2023 12:57 AM    Performed by: Jose Ramon Faith MD  Authorized by: Jose Ramon Faith MD  Direct patient critical care time: 16 minutes  Additional history critical care time: 7 minutes  Ordering / reviewing critical care time: 7 minutes  Documentation critical  care time: 8 minutes  Consulting other physicians critical care time: 10 minutes  Total critical care time (exclusive of procedural time) : 48 minutes  Critical care time was exclusive of separately billable procedures and treating other patients and teaching time.  Critical care was necessary to treat or prevent imminent or life-threatening deterioration of the following conditions: ST-elevation MI.  Critical care was time spent personally by me on the following activities: blood draw for specimens, discussions with consultants, interpretation of cardiac output measurements, evaluation of patient's response to treatment, examination of patient, obtaining history from patient or surrogate, ordering and review of laboratory studies, ordering and performing treatments and interventions, ordering and review of radiographic studies, pulse oximetry, re-evaluation of patient's condition and review of old charts.             Orders Placed This Encounter    X-Ray Chest AP Portable    CBC auto differential    Comprehensive metabolic panel    Troponin I #1    CBC auto differential    Comprehensive metabolic panel    Troponin I    APTT    APTT    Protime-INR    CBC auto differential    CBC auto differential    Diet NPO    Vital signs    Cardiac Monitoring - Adult    Draw aPTT level 6 hours after start of infusion; adjust dosage and order aPTT based on the nomogram in hyperlink    Do not administer any intramuscular injections, call physician for an alternative route or medication if medication is needed    If bleeding occurs, or HIT is suspected, stop heparin infusion and contact physician    Pulse Oximetry Continuous    EKG 12-lead    EKG 12-lead    Type & Screen    Saline lock IV    Saline lock IV    2nd Saline lock IV     Possible Hospitalization    Place in Observation    Cardiac catheterization    aluminum-magnesium hydroxide-simethicone 200-200-20 mg/5 mL suspension 30 mL    aspirin EC tablet 325 mg    ticagrelor tablet  180 mg    heparin 25,000 units in dextrose 5% (100 units/ml) IV bolus from bag INITIAL BOLUS (max bolus 4000 units)    heparin 25,000 units in dextrose 5% 250 mL (100 units/mL) infusion LOW INTENSITY nomogram - [facility]    heparin 25,000 units in dextrose 5% (100 units/ml) IV bolus from bag - ADDITIONAL PRN BOLUS - 60 units/kg (max bolus 4000 units)    heparin 25,000 units in dextrose 5% (100 units/ml) IV bolus from bag - ADDITIONAL PRN BOLUS - 30 units/kg (max bolus 4000 units)          ED Course as of 09/14/23 0056   Wed Sep 13, 2023   2146 Independent Interpretation of EKG:  Rhythm:  Sinus rhythm with First-degree block  Rate: 71   Axis:  Normal  QTC:  419  No STEMI   [KB]   2359 Repeat EKG obtained due to return of CP. New EKG with AVE in inferior leads without obvious reciprocal change. Discussing with Dr. Mitchell... he would like a repeat and then will decide plan. [KB]   Thu Sep 14, 2023   0008 Repeat EKG discussed with Dr. Mitchell, he would like to activate STEMI protocol and would like us to give brillinta 180 and heparin. [KB]   0041 Transport delayed due to [facility] sending the wrong type of ambulance crew to the ED. This crew unable to transport STEMI or Heparin drip. New truck 5-10 minutes out. [KB]   0056 Pt in ambulance leaving for [place]. Dr. Mitchell and [facility] updated. [KB]      ED Course User Index  [KB] Jose Ramon Faith MD              Medical Decision Making       The patient's list of active medical problems, social history, medications, and allergies as documented per RN staff has been reviewed.       Medical Decision Making:   History:   Old Medical Records: I decided to obtain old medical records.  Old Records Summarized: records from previous admission(s) and records from clinic visits.  Independently Interpreted Test(s):   I have ordered and independently interpreted X-rays - see prior notes.  I have ordered and independently interpreted EKG Reading(s) - see prior notes  Clinical Tests:   Lab  Tests: Ordered and Reviewed  Radiological Study: Ordered and Reviewed  Medical Tests: Ordered and Reviewed  Other:   I have discussed this case with another health care provider.             Clinical Impression       Disposition   ED Disposition Condition    Observation Fair          Diagnosis    ICD-10-CM ICD-9-CM   1. Chest pain  R07.9 786.50   2. STEMI (ST elevation myocardial infarction)  I21.3 410.90           Jose Ramon Faith MD        09/14/2023          DISCLAIMER: This note was prepared with [employer] voice recognition transcription software. Garbled syntax, mangled pronouns, and other bizarre constructions may be attributed to that software system.       Jose Ramon Faith MD  09/14/23 [id number]

## 2023-09-14 NOTE — ED NOTES
Pt states chest pain has started to get worse, repeat EKG done  EKG immediately brought to MAURIZIO Faith MD

## 2023-09-14 NOTE — ASSESSMENT & PLAN NOTE
Goal BP < 130/80.    - continue home meds as appropriate  - risk factor and lifestyle modifications

## 2023-09-14 NOTE — PLAN OF CARE
Pt without chest pain or sob today, puncture site rt wrist, dry and intact, Area soft, no evidence of hematoma, good pulse, no numbness, tingling, inc activity, will need diet instructions, and instructions on meds , started education today, keep bed in low position, cAll bell easy reach, side rails up x3, bed alArm on

## 2023-09-15 VITALS
OXYGEN SATURATION: 98 % | WEIGHT: 235 LBS | DIASTOLIC BLOOD PRESSURE: 89 MMHG | RESPIRATION RATE: 18 BRPM | TEMPERATURE: 98 F | BODY MASS INDEX: 33.64 KG/M2 | HEART RATE: 71 BPM | HEIGHT: 70 IN | SYSTOLIC BLOOD PRESSURE: 173 MMHG

## 2023-09-15 DIAGNOSIS — I21.19 ACUTE ST ELEVATION MYOCARDIAL INFARCTION (STEMI) OF INFERIOR WALL: Primary | ICD-10-CM

## 2023-09-15 PROBLEM — R06.02 SHORTNESS OF BREATH: Status: ACTIVE | Noted: 2019-03-06

## 2023-09-15 PROBLEM — I25.110 CORONARY ARTERY DISEASE INVOLVING NATIVE CORONARY ARTERY OF NATIVE HEART WITH UNSTABLE ANGINA PECTORIS: Status: ACTIVE | Noted: 2023-09-15

## 2023-09-15 LAB
ALBUMIN SERPL BCP-MCNC: 3.9 G/DL (ref 3.5–5.2)
ALP SERPL-CCNC: 87 U/L (ref 55–135)
ALT SERPL W/O P-5'-P-CCNC: 34 U/L (ref 10–44)
ANION GAP SERPL CALC-SCNC: 11 MMOL/L (ref 8–16)
AST SERPL-CCNC: 24 U/L (ref 10–40)
BILIRUB SERPL-MCNC: 1.1 MG/DL (ref 0.1–1)
BUN SERPL-MCNC: 15 MG/DL (ref 8–23)
CALCIUM SERPL-MCNC: 9.5 MG/DL (ref 8.7–10.5)
CHLORIDE SERPL-SCNC: 104 MMOL/L (ref 95–110)
CO2 SERPL-SCNC: 25 MMOL/L (ref 23–29)
CREAT SERPL-MCNC: 1.3 MG/DL (ref 0.5–1.4)
ERYTHROCYTE [DISTWIDTH] IN BLOOD BY AUTOMATED COUNT: 12.3 % (ref 11.5–14.5)
EST. GFR  (NO RACE VARIABLE): 59 ML/MIN/1.73 M^2
GLUCOSE SERPL-MCNC: 98 MG/DL (ref 70–110)
HCT VFR BLD AUTO: 43.1 % (ref 40–54)
HGB BLD-MCNC: 14.5 G/DL (ref 14–18)
MAGNESIUM SERPL-MCNC: 1.9 MG/DL (ref 1.6–2.6)
MCH RBC QN AUTO: 30.7 PG (ref 27–31)
MCHC RBC AUTO-ENTMCNC: 33.6 G/DL (ref 32–36)
MCV RBC AUTO: 91 FL (ref 82–98)
PHOSPHATE SERPL-MCNC: 2.9 MG/DL (ref 2.7–4.5)
PLATELET # BLD AUTO: 232 K/UL (ref 150–450)
PMV BLD AUTO: 10.3 FL (ref 9.2–12.9)
POTASSIUM SERPL-SCNC: 3.4 MMOL/L (ref 3.5–5.1)
PROT SERPL-MCNC: 7.8 G/DL (ref 6–8.4)
RBC # BLD AUTO: 4.72 M/UL (ref 4.6–6.2)
SODIUM SERPL-SCNC: 140 MMOL/L (ref 136–145)
TROPONIN I SERPL DL<=0.01 NG/ML-MCNC: 0.67 NG/ML (ref 0–0.03)
TROPONIN I SERPL DL<=0.01 NG/ML-MCNC: 0.76 NG/ML (ref 0–0.03)
WBC # BLD AUTO: 6.3 K/UL (ref 3.9–12.7)

## 2023-09-15 PROCEDURE — 36415 COLL VENOUS BLD VENIPUNCTURE: CPT | Performed by: STUDENT IN AN ORGANIZED HEALTH CARE EDUCATION/TRAINING PROGRAM

## 2023-09-15 PROCEDURE — 83735 ASSAY OF MAGNESIUM: CPT | Performed by: STUDENT IN AN ORGANIZED HEALTH CARE EDUCATION/TRAINING PROGRAM

## 2023-09-15 PROCEDURE — 93010 ELECTROCARDIOGRAM REPORT: CPT | Mod: 76,,, | Performed by: INTERNAL MEDICINE

## 2023-09-15 PROCEDURE — 25000003 PHARM REV CODE 250: Performed by: STUDENT IN AN ORGANIZED HEALTH CARE EDUCATION/TRAINING PROGRAM

## 2023-09-15 PROCEDURE — 63600175 PHARM REV CODE 636 W HCPCS

## 2023-09-15 PROCEDURE — 99900035 HC TECH TIME PER 15 MIN (STAT)

## 2023-09-15 PROCEDURE — 99232 SBSQ HOSP IP/OBS MODERATE 35: CPT | Mod: ,,, | Performed by: NURSE PRACTITIONER

## 2023-09-15 PROCEDURE — 94761 N-INVAS EAR/PLS OXIMETRY MLT: CPT

## 2023-09-15 PROCEDURE — 93010 ELECTROCARDIOGRAM REPORT: CPT | Mod: ,,, | Performed by: INTERNAL MEDICINE

## 2023-09-15 PROCEDURE — 93010 EKG 12-LEAD: ICD-10-PCS | Mod: 76,,, | Performed by: INTERNAL MEDICINE

## 2023-09-15 PROCEDURE — 84484 ASSAY OF TROPONIN QUANT: CPT

## 2023-09-15 PROCEDURE — 36415 COLL VENOUS BLD VENIPUNCTURE: CPT

## 2023-09-15 PROCEDURE — 85027 COMPLETE CBC AUTOMATED: CPT | Performed by: STUDENT IN AN ORGANIZED HEALTH CARE EDUCATION/TRAINING PROGRAM

## 2023-09-15 PROCEDURE — 80053 COMPREHEN METABOLIC PANEL: CPT | Performed by: STUDENT IN AN ORGANIZED HEALTH CARE EDUCATION/TRAINING PROGRAM

## 2023-09-15 PROCEDURE — 84100 ASSAY OF PHOSPHORUS: CPT | Performed by: STUDENT IN AN ORGANIZED HEALTH CARE EDUCATION/TRAINING PROGRAM

## 2023-09-15 PROCEDURE — 25000003 PHARM REV CODE 250

## 2023-09-15 PROCEDURE — 99232 PR SUBSEQUENT HOSPITAL CARE,LEVL II: ICD-10-PCS | Mod: ,,, | Performed by: NURSE PRACTITIONER

## 2023-09-15 PROCEDURE — 84484 ASSAY OF TROPONIN QUANT: CPT | Mod: 91 | Performed by: STUDENT IN AN ORGANIZED HEALTH CARE EDUCATION/TRAINING PROGRAM

## 2023-09-15 PROCEDURE — 93005 ELECTROCARDIOGRAM TRACING: CPT | Mod: ER

## 2023-09-15 PROCEDURE — 93005 ELECTROCARDIOGRAM TRACING: CPT

## 2023-09-15 RX ORDER — CARVEDILOL 25 MG/1
25 TABLET ORAL 2 TIMES DAILY
Qty: 180 TABLET | Refills: 3 | Status: SHIPPED | OUTPATIENT
Start: 2023-09-15 | End: 2024-09-14

## 2023-09-15 RX ORDER — LOSARTAN POTASSIUM 100 MG/1
100 TABLET ORAL DAILY
Qty: 90 TABLET | Refills: 3 | Status: SHIPPED | OUTPATIENT
Start: 2023-09-15 | End: 2024-09-14

## 2023-09-15 RX ORDER — LABETALOL HYDROCHLORIDE 5 MG/ML
10 INJECTION, SOLUTION INTRAVENOUS ONCE
Status: COMPLETED | OUTPATIENT
Start: 2023-09-15 | End: 2023-09-15

## 2023-09-15 RX ORDER — ATORVASTATIN CALCIUM 80 MG/1
80 TABLET, FILM COATED ORAL DAILY
Qty: 90 TABLET | Refills: 3 | Status: SHIPPED | OUTPATIENT
Start: 2023-09-16 | End: 2024-09-15

## 2023-09-15 RX ORDER — HYDROCHLOROTHIAZIDE 25 MG/1
25 TABLET ORAL DAILY
Qty: 30 TABLET | Refills: 11 | Status: SHIPPED | OUTPATIENT
Start: 2023-09-15 | End: 2024-09-14

## 2023-09-15 RX ADMIN — ASPIRIN 81 MG: 81 TABLET, CHEWABLE ORAL at 08:09

## 2023-09-15 RX ADMIN — TICAGRELOR 90 MG: 90 TABLET ORAL at 08:09

## 2023-09-15 RX ADMIN — CARVEDILOL 25 MG: 25 TABLET, FILM COATED ORAL at 08:09

## 2023-09-15 RX ADMIN — PANTOPRAZOLE SODIUM 40 MG: 40 TABLET, DELAYED RELEASE ORAL at 08:09

## 2023-09-15 RX ADMIN — MUPIROCIN: 20 OINTMENT TOPICAL at 08:09

## 2023-09-15 RX ADMIN — POTASSIUM & SODIUM PHOSPHATES POWDER PACK 280-160-250 MG 1 PACKET: 280-160-250 PACK at 08:09

## 2023-09-15 RX ADMIN — HYDROCHLOROTHIAZIDE 25 MG: 25 TABLET ORAL at 08:09

## 2023-09-15 RX ADMIN — ATORVASTATIN CALCIUM 80 MG: 40 TABLET, FILM COATED ORAL at 08:09

## 2023-09-15 RX ADMIN — LABETALOL HYDROCHLORIDE 10 MG: 5 INJECTION INTRAVENOUS at 04:09

## 2023-09-15 RX ADMIN — LOSARTAN POTASSIUM 100 MG: 50 TABLET, FILM COATED ORAL at 08:09

## 2023-09-15 NOTE — PROGRESS NOTES
"[facility] Medicine Progress Note     Primary Team: [facility] Hospitalist Team B  Attending Physician: Elias Ibrahim MD  Resident: Jaime  Intern: Madeleine    Subjective:      Shortness of breath overnight that resolved with no intervention. Denies CP, SOB, abdominal pain this morning. Eating and drinking well. Patient comfortable with going home today.      Objective:     Last 24 Hour Vital Signs:  BP  Min: 120/70  Max: 191/93  Temp  Av.5 °F (36.4 °C)  Min: 96.2 °F (35.7 °C)  Max: 98.5 °F (36.9 °C)  Pulse  Av.7  Min: 60  Max: 80  Resp  Av.5  Min: 13  Max: 30  SpO2  Av.3 %  Min: 96 %  Max: 99 %  Height  Av' 10" (177.8 cm)  Min: 5' 10" (177.8 cm)  Max: 5' 10" (177.8 cm)  Weight  Av.6 kg (235 lb)  Min: 106.6 kg (235 lb)  Max: 106.6 kg (235 lb)  I/O last 3 completed shifts:  In: 1358.6 [P.O.:790; I.V.:568.6]  Out: 1650 [Urine:1650]    Physical Examination:  Constitutional: cooperative, calm, appears stated age.  HEENT: NCAT, PERRL, MMM  Neck: Supple, no masses, trachea not deviated.  Resp: Symmetrical, clear to auscultation bilaterally, No wheezes, rhonchi, or crackles.   Cardio: RRR, S1 and S2 normal, no murmurs noted  GI: Soft, non-tender, bowel sounds active.  Extremities: No edema, peripheral pulses 2+ and symmetric, extremities warm.  Skin: No rashes, bruises, or lesions.    Neurologic: Alert and oriented x3, follows commands, moves extremities spontaneously. No focal deficits.     Laboratory:  Laboratory Data Reviewed: yes    Microbiology Data Reviewed: yes    Other Results:  EKG (my interpretation): Sinus rhythm with 1st degree block, no acute ischemic changes     Radiology Data Reviewed: yes    Current Medications:     Infusions:       Scheduled:   aspirin  81 mg Oral Daily    atorvastatin  80 mg Oral Daily    carvediloL  25 mg Oral BID    hydroCHLOROthiazide  25 mg Oral Daily    losartan  100 mg Oral Daily    mupirocin   Nasal BID    pantoprazole  40 mg Oral BID    potassium, sodium " "phosphates  1 packet Oral QID (WM & HS)    ticagrelor  90 mg Oral BID        PRN:  acetaminophen    Antibiotics and Day Number of Therapy:  None     Lines and Day Number of Therapy:  PIVC    Assessment:     Luis Mendez is a 73 y.o. M with a PMH of HTN, GERD, GISELA, and prostate cancer that presented w/CP and found to have a STEMI so urgently sent here for an angiogram. Found to have 2 vessel CAD now s/p 2 PCIs. Admitted to [facility] for post STEMI care.      Plan:     STEMI  CAD s/p 2 PCIs  - Prior TTE 1/2023: EF wnl, no diastolic dysfunction, trivial pericardial effusion. Prior NM Stress Test 2018 wnl.  - Troponins stable, peaked at 39  -  Initial EKG w/1st degree AV block and then repeat had new ST elevations in inferior leads (not uploaded in system)  - Given ASA, ticagrelor, and heparin bolus in the ED  - Underwent urgent LHC 9/14  - TTE with normal LVEF, no WMA   - 2 vessel CAD,  2 VENU placed   - Lipid panel: Chol 136  TG 36  LDL 88.8  HDL 40  - LDL goal <70  - On statin 80 and DAPT therapy  - Follow up with Dr. Mitchell 09/27     HTN  - Home regimen: losartan/HCTZ 100/25, coreg 25 BID, aldactone 25, norvasc 10  - Continue coreg, losartan, and HCTZ    Hypokalemia  Hypophosphatemia  - Phos 2.2, K 3.2 on admit   - Replete as needed      GERD  - Cont home pantoprazole 40 BID     Healthcare Maintenance  - Polyps found on C-scope 2018, due 2012  - TSH 1.19, A1c 5.7     Diet: cardiac  DVT: SCDs     Dispo: Discharge today, follow up with cardiology on 09/27     Code Status:      Full    Adenike "Carol" MD Jaime  [facility] Internal Medicine HO-III       [facility] Medicine Hospitalist Pager numbers:   [facility] Hospitalist Medicine Team A (Phan/Michelle): [phone]  [facility] Hospitalist Medicine Team B (Patrice/Moy):  [phone]      "

## 2023-09-15 NOTE — PROGRESS NOTES
[facility] - Telemetry  Cardiology  Progress Note    Patient Name: Luis Mendez  MRN: [id number]  Admission Date: 9/13/2023  Hospital Length of Stay: 1 days  Code Status: No Order   Attending Physician: Elias Ibrahim MD   Primary Care Physician: Karen Aragon MD  Expected Discharge Date: 9/15/2023  Principal Problem:Acute ST elevation myocardial infarction (STEMI) of inferior wall    Subjective:     Hospital Course:   9/14/2023 STEMI with emergent LHC with successful RCA/PLV stent. Admitted to ICU. BP initially 150s-160s and trended down with oral regimen. Remains chest pain free. Echo with normal LVEF. HR stable. No arrhythmias noted on telemetry. Continue GDMT and suitable for transfer to the floor. If remains stable overnight and able to ambulate with no issues. Anticipate discharge tomorrow   9/15/2023 Transferred to floor yesterday Chest pain free overnight. HR and BP stable. Echo with normal LVEF. CBC and BMP WNL           Review of Systems   Constitutional: Negative for chills, decreased appetite, diaphoresis and fever.   Cardiovascular:  Negative for chest pain, claudication, cyanosis, dyspnea on exertion, irregular heartbeat, leg swelling, near-syncope, orthopnea, palpitations, paroxysmal nocturnal dyspnea and syncope.   Respiratory:  Negative for cough, hemoptysis, shortness of breath and wheezing.    Gastrointestinal:  Negative for bloating, abdominal pain, constipation, diarrhea, melena, nausea and vomiting.   Neurological:  Negative for dizziness and weakness.     Objective:     Vital Signs (Most Recent):  Temp: 98 °F (36.7 °C) (09/15/23 0800)  Pulse: 69 (09/15/23 0800)  Resp: 18 (09/15/23 0800)  BP: (!) 167/90 (09/15/23 0800)  SpO2: 96 % (09/15/23 0800) Vital Signs (24h Range):  Temp:  [96.2 °F (35.7 °C)-98.5 °F (36.9 °C)] 98 °F (36.7 °C)  Pulse:  [60-80] 69  Resp:  [14-30] 18  SpO2:  [96 %-99 %] 96 %  BP: (120-191)/() 167/90     Weight: 106.6 kg (235 lb)  Body mass index is 33.72 kg/m².      SpO2: 96 %         Intake/Output Summary (Last 24 hours) at 9/15/2023 0955  Last data filed at 9/14/2023 1731  Gross per 24 hour   Intake 350 ml   Output 900 ml   Net -550 ml       Lines/Drains/Airways       Peripheral Intravenous Line  Duration                  Peripheral IV - Single Lumen 09/13/23 2349 20 G Anterior;Right Wrist 1 day         Peripheral IV - Single Lumen 09/14/23 0001 20 G Left Antecubital 1 day                       Physical Exam  Constitutional:       General: He is not in acute distress.     Appearance: He is well-developed.   Cardiovascular:      Rate and Rhythm: Normal rate and regular rhythm.      Heart sounds: No murmur heard.     No gallop.   Pulmonary:      Effort: Pulmonary effort is normal. No respiratory distress.      Breath sounds: Normal breath sounds. No wheezing.   Abdominal:      General: Bowel sounds are normal. There is no distension.      Palpations: Abdomen is soft.      Tenderness: There is no abdominal tenderness.   Skin:     General: Skin is warm and dry.   Neurological:      Mental Status: He is alert and oriented to person, place, and time.            Significant Labs: BMP:   Recent Labs   Lab 09/14/23  0325 09/14/23  1243 09/15/23  0239   *  133* 120* 98     139 139 140   K 3.2*  3.2* 3.7 3.4*     104 105 104   CO2 23  25 26 25   BUN 15  16 14 15   CREATININE 1.3  1.3 1.3 1.3   CALCIUM 9.2  9.4 9.2 9.5   MG 1.9  --  1.9    and CBC   Recent Labs   Lab 09/13/23  2157 09/14/23  0325 09/15/23  0239   WBC 5.32 7.18  7.18 6.30   HGB 14.7 14.8  14.8 14.5   HCT 44.6 43.7  43.2 43.1    223  237 232       Significant Imaging: Echocardiogram: Transthoracic echo (TTE) complete ([facility] Only):   Results for orders placed or performed during the hospital encounter of 09/13/23   Echo Saline Bubble? No   Result Value Ref Range    BSA 2.29 m2    Mondragon's Biplane MOD Ejection Fraction 65 %    LVOT stroke volume 77.91 cm3    LVIDd 5.16 3.5 - 6.0 cm     "LV Systolic Volume 52.54 mL    LV Systolic Volume Index 23.5 mL/m2    LVIDs 3.55 2.1 - 4.0 cm    LV Diastolic Volume 126.93 mL    LV Diastolic Volume Index 56.67 mL/m2    IVS 1.20 (A) 0.6 - 1.1 cm    LVOT diameter 2.46 cm    LVOT area 4.8 cm2    FS 31 28 - 44 %    Left Ventricle Relative Wall Thickness 0.47 cm    Posterior Wall 1.22 (A) 0.6 - 1.1 cm    LV mass 248.65 g    LV Mass Index 111 g/m2    MV Peak E Luther 0.47 m/s    TDI LATERAL 0.07 m/s    TDI SEPTAL 0.04 m/s    E/E' ratio 8.55 m/s    MV Peak A Luther 0.75 m/s    TR Max Luther 1.77 m/s    E/A ratio 0.63     E wave deceleration time 272.48 msec    MV "A" wave duration [id number] msec    LV SEPTAL E/E' RATIO 11.75 m/s    LV LATERAL E/E' RATIO 6.71 m/s    PV Peak S Luther 0.62 m/s    PV Peak D Luther 0.34 m/s    Pulm vein S/D ratio 1.82     LVOT peak luther 0.85 m/s    Left Ventricular Outflow Tract Mean Velocity 0.55 cm/s    Left Ventricular Outflow Tract Mean Gradient 1.39 mmHg    LA volume (mod) 22.64 cm3    LA Volume Index (Mod) 10.1 mL/m2    LA size 3.35 cm    Left Atrium Major Axis 4.08 cm    Left Atrium Minor Axis 3.27 cm    RVDD 3.41 cm    RV S' 9.84 cm/s    RA Major Axis 4.30 cm    RA Width 2.71 cm    AV mean gradient 3 mmHg    AV peak gradient 5 mmHg    Ao peak luther 1.10 m/s    Ao VTI 21.70 cm    LVOT peak VTI 16.40 cm    AV valve area 3.59 cm²    AV Velocity Ratio 0.77     AV index (prosthetic) 0.76     ANUPAM by Velocity Ratio 3.67 cm²    MV mean gradient 1 mmHg    MV peak gradient 2 mmHg    MV stenosis pressure 1/2 time 79.02 ms    MV valve area p 1/2 method 2.78 cm2    MV valve area by continuity eq 4.75 cm2    MV VTI 16.4 cm    Triscuspid Valve Regurgitation Peak Gradient 13 mmHg    PV PEAK VELOCITY 0.77 m/s    PV peak gradient 2 mmHg    Pulmonary Valve Mean Velocity 0.58 m/s    Sinus 4.07 cm    STJ 3.80 cm    Ascending aorta 3.99 cm    IVC diameter 1.18 cm    Mean e' 0.06 m/s    ZLVIDS -2.48     ZLVIDD -4.40     LA Volume Index 15.7 mL/m2    LA volume 35.15 " cm3    LA WIDTH 3.4 cm    TV resting pulmonary artery pressure 16 mmHg    RV TB RVSP 5 mmHg    Est. RA pres 3 mmHg    Narrative      Left Ventricle: The left ventricle is normal in size. Normal wall   thickness. There is concentric remodeling. Normal wall motion. There is   normal systolic function with a visually estimated ejection fraction of 60   - 65%. Biplane (2D) method of discs ejection fraction is 65%.    Right Ventricle: Normal right ventricular cavity size. Systolic   function is normal.    Aortic Valve: The aortic valve is a trileaflet valve.    Mitral Valve: There is no stenosis. The mean pressure gradient across   the mitral valve is 1 mmHg at a heart rate of  bpm.    Pulmonic Valve: There is no stenosis. There is mild regurgitation with   a centrally directed jet.    Pulmonary Artery: The estimated pulmonary artery systolic pressure is   16 mmHg.    IVC/SVC: Normal venous pressure at 3 mmHg.       Assessment and Plan:     Brief HPI: Seen on AM NP rounds while resting in bed with family at bedside. Denies any complaints. Discussed POC as detailed below-verbalized understanding and agrees with POC     * Acute ST elevation myocardial infarction (STEMI) of inferior wall  - initial ECG negative and repeat ECG w/ increase in cp noted mild ST elevations inferiorly.    - emergent LHC with RCA VENU and distal LAD stenosis- small vessel with plans for medical management  - chest pain free overnight  - continue DAPT, statin, BB and ARB; stressed importance of compliance with DAPT and risk of abrupt stent occlusion with early discontinuation  - suitable for discharge from cardiac standpoint; will refer to cardiac rehab as an outpatient         Coronary artery disease involving native coronary artery of native heart with unstable angina pectoris  - plan as detailed under acute STEMI     Class 1 obesity due to excess calories with serious comorbidity and body mass index (BMI) of 33.0 to 33.9 in  adult  Encourage lifestyle modifications (diet, exercise, and weight loss).      Mixed hyperlipidemia  - goal LDL < 70 given CAD dx  - statin therapy w/ high intensity statin  - risk factor and lifestyle modifications   - will follow FLP as an outpatient    Essential hypertension  - goal BP < 130/80.    - on Coreg, HCTZ and Losartan  - BP well controlled  - continue current regimen           VTE Risk Mitigation (From admission, onward)    None          CYRUS Bell, ANP  Cardiology  [facility] - Telemetry

## 2023-09-15 NOTE — DISCHARGE SUMMARY
[facility] Medicine Discharge Summary    Primary Team: [facility] Hospitalist Team B  Attending Physician: Elias Ibrahim MD  Resident: Jaime  Intern: Madeleine     Date of Admit: 9/13/2023  Date of Discharge: 9/15/2023    Discharge to: Home   Condition: Stable    Discharge Diagnoses     STEMI s/p PCI x2 VENU    Consultants and Procedures     Consultants:  Cardiology     Procedures:   Left heart cath with PCI    Brief History of Present Illness      HPI: Luis Mendez is a 73 y.o. M with a PMH of HTN, GERD, and prostate cancer who presented to [facility] ED for midsternal chest pain that began intermittently yesterday and worsened today. Tried taking an antacid and pepto bismo with no relief. In the ED was given a GI cocktail with no improvement. Troponin elevated to 39 and EKG showing new 1st degree AV block so requested to transfer to [facility] for ACS workup and assistance w/finding a new cardiologist as his prior one left. However, a few hours later pt developed worsening CP and a new EKG showed ST elevations in inferior leads. Pt was STEMI activated and sent to [facility] for the cath lab.      Hospital Course: Patient underwent LHC on 09/14. Two VENU were placed in proximal RCA and RPAV. Patient admitted to ICU and started on high intensity statin and DAPT. Stepped down on HD2 and patient without further chest pain or EKG changes. Per cardiology, patient stable for discharge with close follow up with cardiology.     Hospital Course By Problem with Pertinent Findings     STEMI  CAD s/p 2 PCIs  - Prior TTE 1/2023: EF wnl, no diastolic dysfunction, trivial pericardial effusion. Prior NM Stress Test 2018 wnl.  - Troponins stable, peaked at 39  -  Initial EKG w/1st degree AV block and then repeat had new ST elevations in inferior leads (not uploaded in system)  - Given ASA, ticagrelor, and heparin bolus in the ED  - Underwent urgent LHC 9/14, 2 vessel CAD,  2 VENU placed   - TTE with normal LVEF, no WMA   - Lipid panel: Chol 136   TG 36  LDL 88.8  HDL 40  - LDL goal <70  - Continue high intensity statin and DAPT therapy on discharge   - Follow up with Dr. Mitchell 09/27     HTN  - Home regimen: losartan/HCTZ 100/25, coreg 25 BID, aldactone 25, norvasc 10  - Continue coreg, losartan, and HCTZ on discharge     Hypokalemia  Hypophosphatemia  - Phos 2.2, K 3.2 on admit   - Replete as needed   - Follow up CMP with cardiology to monitor electrolytes      GERD  - Cont home pantoprazole 40 BID    Discharge Medications        Medication List        START taking these medications      ticagrelor 90 mg tablet  Commonly known as: BRILINTA  Take 1 tablet (90 mg total) by mouth 2 (two) times daily.            CHANGE how you take these medications      atorvastatin 80 MG tablet  Commonly known as: LIPITOR  Take 1 tablet (80 mg total) by mouth once daily.  Start taking on: September 16, 2023  What changed:   medication strength  how much to take            CONTINUE taking these medications      amLODIPine 10 MG tablet  Commonly known as: NORVASC  Take 1 tablet (10 mg total) by mouth once daily.     aspirin 81 MG EC tablet  Commonly known as: ECOTRIN  Take 1 tablet (81 mg total) by mouth once daily.     carvediloL 25 MG tablet  Commonly known as: COREG  Take 1 tablet (25 mg total) by mouth 2 (two) times daily.     hydroCHLOROthiazide 25 MG tablet  Commonly known as: HYDRODIURIL  Take 1 tablet (25 mg total) by mouth once daily.     losartan 100 MG tablet  Commonly known as: COZAAR  Take 1 tablet (100 mg total) by mouth once daily.     pantoprazole 40 MG tablet  Commonly known as: PROTONIX            STOP taking these medications      spironolactone 25 MG tablet  Commonly known as: ALDACTONE     traMADoL 50 mg tablet  Commonly known as: ULTRAM               Where to Get Your Medications        These medications were sent to [id number] - [place], [place] - [address] [facility]  [address] [facility] [address]      Phone: [phone]   atorvastatin  "80 MG tablet  carvediloL 25 MG tablet  hydroCHLOROthiazide 25 MG tablet  losartan 100 MG tablet  ticagrelor 90 mg tablet         Discharge Information:   Diet:  Cardiac     Physical Activity:  As tolerated by patient.              Instructions:  1. Take all medications as prescribed  2. Keep all follow-up appointments  3. Return to the hospital or call your primary care physicians if any worsening symptoms such as fever, chest pain, shortness of breath, return of symptoms, or any other concerns.    Follow-Up Appointments:   Follow-up Information       Priscilla Gonzalez MD Follow up on 9/27/2023.    Specialty: Internal Medicine  Why: 2:30pm [facility] F/U  Contact information:  [address]  [address]  [facility] [address]  [phone]               [facility] - Cardiology Follow up on 9/27/2023.    Specialty: Cardiology  Why: Dr. Moustapha Mitchell  3:40pm [facility] F/U  Contact information:  [address]  [facility] [address] [phone]  Additional information:  Please park in [address] or [address] and enter building by using [place] entrance. Check in at central registration near [facility] in [facility]. Proceed to [address] waiting area once checked in.                             Adenike Sandoval MD ([place])  [facility] Internal Medicine HO-III                   "

## 2023-09-15 NOTE — ASSESSMENT & PLAN NOTE
- goal LDL < 70 given CAD dx  - statin therapy w/ high intensity statin  - risk factor and lifestyle modifications   - will follow FLP as an outpatient

## 2023-09-15 NOTE — ASSESSMENT & PLAN NOTE
- initial ECG negative and repeat ECG w/ increase in cp noted mild ST elevations inferiorly.    - emergent LHC with RCA VENU and distal LAD stenosis- small vessel with plans for medical management  - chest pain free overnight  - continue DAPT, statin, BB and ARB; stressed importance of compliance with DAPT and risk of abrupt stent occlusion with early discontinuation  - suitable for discharge from cardiac standpoint; will refer to cardiac rehab as an outpatient

## 2023-09-15 NOTE — PLAN OF CARE
Problem: Adult Inpatient Plan of Care  Goal: Plan of Care Review  Outcome: Met  Goal: Patient-Specific Goal (Individualized)  Outcome: Met  Goal: Absence of Hospital-Acquired Illness or Injury  Outcome: Met  Goal: Optimal Comfort and Wellbeing  Outcome: Met  Goal: Readiness for Transition of Care  Outcome: Met     Problem: Arrhythmia/Dysrhythmia (Cardiac Catheterization)  Goal: Stable Heart Rate and Rhythm  Outcome: Met     Problem: Bleeding (Cardiac Catheterization)  Goal: Absence of Bleeding  Outcome: Met     Problem: Contrast-Induced Injury Risk (Cardiac Catheterization)  Goal: Absence of Contrast-Induced Injury  Outcome: Met     Problem: Embolism (Cardiac Catheterization)  Goal: Absence of Embolism Signs and Symptoms  Outcome: Met     Problem: Ongoing Anesthesia/Sedation Effects (Cardiac Catheterization)  Goal: Anesthesia/Sedation Recovery  Outcome: Met     Problem: Pain (Cardiac Catheterization)  Goal: Acceptable Pain Control  Outcome: Met     Problem: Vascular Access Protection (Cardiac Catheterization)  Goal: Absence of Vascular Access Complication  Outcome: Met     Problem: Hypertension Comorbidity  Goal: Blood Pressure in Desired Range  Outcome: Met     Problem: Chest Pain  Goal: Resolution of Chest Pain Symptoms  Outcome: Met     Problem: Obstructive Sleep Apnea Risk or Actual Comorbidity Management  Goal: Unobstructed Breathing During Sleep  Outcome: Met     Problem: Fall Injury Risk  Goal: Absence of Fall and Fall-Related Injury  Outcome: Met     Problem: Adjustment to Illness (Acute Coronary Syndrome)  Goal: Optimal Adaptation to Illness  Outcome: Met     Problem: Dysrhythmia (Acute Coronary Syndrome)  Goal: Normalized Cardiac Rhythm  Outcome: Met     Problem: Cardiac-Related Pain (Acute Coronary Syndrome)  Goal: Absence of Cardiac-Related Pain  Outcome: Met     Problem: Hemodynamic Instability (Acute Coronary Syndrome)  Goal: Effective Cardiac Pump Function  Outcome: Met     Problem: Tissue Perfusion  (Acute Coronary Syndrome)  Goal: Adequate Tissue Perfusion  Outcome: Met

## 2023-09-15 NOTE — NURSING
Discharge orders noted. Additional clinical references attached.    Patient's discharge instructions given by bedside RN and reviewed via this VN.  Education provided on new medication, diagnosis, and follow-up appointments.  Teach back method used. Patient verbalized understanding. All questions answered. Transport to [facility] requested. Floor nurse notified.      09/15/23 1206   AVS Confirmation   Discharge instructions and AVS given to and reviewed with patient and/or significant other. Yes

## 2023-09-15 NOTE — PROGRESS NOTES
[facility]                    Pharmacy       Discharge Medication Education    Patient ACCEPTED medication education. Pharmacy has provided education on the name, indication, and possible side effects of the medication(s) prescribed, using teach-back method.     The following medications have also been discussed, during this admission.        Medication List        START taking these medications      ticagrelor 90 mg tablet  Commonly known as: BRILINTA  Take 1 tablet (90 mg total) by mouth 2 (two) times daily.            CHANGE how you take these medications      atorvastatin 80 MG tablet  Commonly known as: LIPITOR  Take 1 tablet (80 mg total) by mouth once daily.  Start taking on: September 16, 2023  What changed:   medication strength  how much to take            CONTINUE taking these medications      amLODIPine 10 MG tablet  Commonly known as: NORVASC  Take 1 tablet (10 mg total) by mouth once daily.     aspirin 81 MG EC tablet  Commonly known as: ECOTRIN  Take 1 tablet (81 mg total) by mouth once daily.     carvediloL 25 MG tablet  Commonly known as: COREG  Take 1 tablet (25 mg total) by mouth 2 (two) times daily.     hydroCHLOROthiazide 25 MG tablet  Commonly known as: HYDRODIURIL  Take 1 tablet (25 mg total) by mouth once daily.     losartan 100 MG tablet  Commonly known as: COZAAR  Take 1 tablet (100 mg total) by mouth once daily.     pantoprazole 40 MG tablet  Commonly known as: PROTONIX            STOP taking these medications      spironolactone 25 MG tablet  Commonly known as: ALDACTONE     traMADoL 50 mg tablet  Commonly known as: ULTRAM               Where to Get Your Medications        These medications were sent to [facility] [address] [place] [address] [address] [facility]  [address] [facility] [address]      Phone: [phone]   atorvastatin 80 MG tablet  carvediloL 25 MG tablet  hydroCHLOROthiazide 25 MG tablet  losartan 100 MG tablet  ticagrelor 90 mg tablet           Thank you  Denia Prescott, PharmD  [phone]

## 2023-09-15 NOTE — ASSESSMENT & PLAN NOTE
- goal BP < 130/80.    - on Coreg, HCTZ and Losartan  - BP well controlled  - continue current regimen

## 2023-09-15 NOTE — CONSULTS
Food & Nutrition  Education    Diet Education: Cardiac  Time Spent: 15 minutes  Learners: pt & wife      Nutrition Education provided with handouts:   Heart Healthy Nutrition Therapy    Comments:  Discussed Cardiac diet with pt. Reviewed over foods high in sodium to avoid and cooking without salt. Also discussed lowering the fat in his diet. Pt & wife voiced understanding. Handouts were provided.    All questions and concerns answered. Dietitian's contact information provided.       Follow-Up: 9/22/23    Please Re-consult as needed        Thanks!

## 2023-09-15 NOTE — PROGRESS NOTES
The sw stressed the importance of the going to the hsp f/u's and taking his medications. The pt's wife acknowledged understanding and states he will comply. The pt's wife will transport him home at d/c. The pt has no further Case Management needs and is clear to d/ today.     Future Appointments   Date Time Provider Department Center   9/27/2023  2:30 PM Priscilla Gonzalez MD [facility] IMPRI [facility]   9/27/2023  3:40 PM Moustapha Mitchell III, MD [facility] CARDIO [facility] - Telemetry  Discharge Final Note    Primary Care Provider: Karen Aragon MD    Expected Discharge Date: 9/15/2023    Final Discharge Note (most recent)       Final Note - 09/14/23 1131          Final Note    Assessment Type Discharge Planning Assessment        Post-Acute Status    Coverage PHN                     Important Message from Medicare             Contact Info       Priscilla Gonzalez MD   Specialty: Internal Medicine    [address]  [address]  [place] [address]   Phone: [phone]       Next Steps: Follow up on 9/27/2023    Instructions: 2:30pm [facility] F/U    [facility]   Specialty: Cardiology    [address]  [place] [address]   Phone: [phone]       Next Steps: Follow up on 9/27/2023    Instructions: Dr. Moustapha Mitchell  3:40pm [facility] F/U

## 2023-09-15 NOTE — SUBJECTIVE & OBJECTIVE
Review of Systems   Constitutional: Negative for chills, decreased appetite, diaphoresis and fever.   Cardiovascular:  Negative for chest pain, claudication, cyanosis, dyspnea on exertion, irregular heartbeat, leg swelling, near-syncope, orthopnea, palpitations, paroxysmal nocturnal dyspnea and syncope.   Respiratory:  Negative for cough, hemoptysis, shortness of breath and wheezing.    Gastrointestinal:  Negative for bloating, abdominal pain, constipation, diarrhea, melena, nausea and vomiting.   Neurological:  Negative for dizziness and weakness.     Objective:     Vital Signs (Most Recent):  Temp: 98 °F (36.7 °C) (09/15/23 0800)  Pulse: 69 (09/15/23 0800)  Resp: 18 (09/15/23 0800)  BP: (!) 167/90 (09/15/23 0800)  SpO2: 96 % (09/15/23 0800) Vital Signs (24h Range):  Temp:  [96.2 °F (35.7 °C)-98.5 °F (36.9 °C)] 98 °F (36.7 °C)  Pulse:  [60-80] 69  Resp:  [14-30] 18  SpO2:  [96 %-99 %] 96 %  BP: (120-191)/() 167/90     Weight: 106.6 kg (235 lb)  Body mass index is 33.72 kg/m².     SpO2: 96 %         Intake/Output Summary (Last 24 hours) at 9/15/2023 0955  Last data filed at 9/14/2023 1731  Gross per 24 hour   Intake 350 ml   Output 900 ml   Net -550 ml       Lines/Drains/Airways       Peripheral Intravenous Line  Duration                  Peripheral IV - Single Lumen 09/13/23 2349 20 G Anterior;Right Wrist 1 day         Peripheral IV - Single Lumen 09/14/23 0001 20 G Left Antecubital 1 day                       Physical Exam  Constitutional:       General: He is not in acute distress.     Appearance: He is well-developed.   Cardiovascular:      Rate and Rhythm: Normal rate and regular rhythm.      Heart sounds: No murmur heard.     No gallop.   Pulmonary:      Effort: Pulmonary effort is normal. No respiratory distress.      Breath sounds: Normal breath sounds. No wheezing.   Abdominal:      General: Bowel sounds are normal. There is no distension.      Palpations: Abdomen is soft.      Tenderness: There  "is no abdominal tenderness.   Skin:     General: Skin is warm and dry.   Neurological:      Mental Status: He is alert and oriented to person, place, and time.            Significant Labs: BMP:   Recent Labs   Lab 09/14/23 0325 09/14/23  1243 09/15/23  0239   *  133* 120* 98     139 139 140   K 3.2*  3.2* 3.7 3.4*     104 105 104   CO2 23  25 26 25   BUN 15  16 14 15   CREATININE 1.3  1.3 1.3 1.3   CALCIUM 9.2  9.4 9.2 9.5   MG 1.9  --  1.9    and CBC   Recent Labs   Lab 09/13/23  2157 09/14/23  0325 09/15/23  0239   WBC 5.32 7.18  7.18 6.30   HGB 14.7 14.8  14.8 14.5   HCT 44.6 43.7  43.2 43.1    223  237 232       Significant Imaging: Echocardiogram: Transthoracic echo (TTE) complete (Cupid Only):   Results for orders placed or performed during the hospital encounter of 09/13/23   Echo Saline Bubble? No   Result Value Ref Range    BSA 2.29 m2    Mondragon's Biplane MOD Ejection Fraction 65 %    LVOT stroke volume 77.91 cm3    LVIDd 5.16 3.5 - 6.0 cm    LV Systolic Volume 52.54 mL    LV Systolic Volume Index 23.5 mL/m2    LVIDs 3.55 2.1 - 4.0 cm    LV Diastolic Volume 126.93 mL    LV Diastolic Volume Index 56.67 mL/m2    IVS 1.20 (A) 0.6 - 1.1 cm    LVOT diameter 2.46 cm    LVOT area 4.8 cm2    FS 31 28 - 44 %    Left Ventricle Relative Wall Thickness 0.47 cm    Posterior Wall 1.22 (A) 0.6 - 1.1 cm    LV mass 248.65 g    LV Mass Index 111 g/m2    MV Peak E Luther 0.47 m/s    TDI LATERAL 0.07 m/s    TDI SEPTAL 0.04 m/s    E/E' ratio 8.55 m/s    MV Peak A Luther 0.75 m/s    TR Max Luther 1.77 m/s    E/A ratio 0.63     E wave deceleration time 272.48 msec    MV "A" wave duration [id number] msec    LV SEPTAL E/E' RATIO 11.75 m/s    LV LATERAL E/E' RATIO 6.71 m/s    PV Peak S Luther 0.62 m/s    PV Peak D Luther 0.34 m/s    Pulm vein S/D ratio 1.82     LVOT peak luther 0.85 m/s    Left Ventricular Outflow Tract Mean Velocity 0.55 cm/s    Left Ventricular Outflow Tract Mean Gradient 1.39 mmHg    " LA volume (mod) 22.64 cm3    LA Volume Index (Mod) 10.1 mL/m2    LA size 3.35 cm    Left Atrium Major Axis 4.08 cm    Left Atrium Minor Axis 3.27 cm    RVDD 3.41 cm    RV S' 9.84 cm/s    RA Major Axis 4.30 cm    RA Width 2.71 cm    AV mean gradient 3 mmHg    AV peak gradient 5 mmHg    Ao peak major 1.10 m/s    Ao VTI 21.70 cm    LVOT peak VTI 16.40 cm    AV valve area 3.59 cm²    AV Velocity Ratio 0.77     AV index (prosthetic) 0.76     ANUPAM by Velocity Ratio 3.67 cm²    MV mean gradient 1 mmHg    MV peak gradient 2 mmHg    MV stenosis pressure 1/2 time 79.02 ms    MV valve area p 1/2 method 2.78 cm2    MV valve area by continuity eq 4.75 cm2    MV VTI 16.4 cm    Triscuspid Valve Regurgitation Peak Gradient 13 mmHg    PV PEAK VELOCITY 0.77 m/s    PV peak gradient 2 mmHg    Pulmonary Valve Mean Velocity 0.58 m/s    Sinus 4.07 cm    STJ 3.80 cm    Ascending aorta 3.99 cm    IVC diameter 1.18 cm    Mean e' 0.06 m/s    ZLVIDS -2.48     ZLVIDD -4.40     LA Volume Index 15.7 mL/m2    LA volume 35.15 cm3    LA WIDTH 3.4 cm    TV resting pulmonary artery pressure 16 mmHg    RV TB RVSP 5 mmHg    Est. RA pres 3 mmHg    Narrative      Left Ventricle: The left ventricle is normal in size. Normal wall   thickness. There is concentric remodeling. Normal wall motion. There is   normal systolic function with a visually estimated ejection fraction of 60   - 65%. Biplane (2D) method of discs ejection fraction is 65%.    Right Ventricle: Normal right ventricular cavity size. Systolic   function is normal.    Aortic Valve: The aortic valve is a trileaflet valve.    Mitral Valve: There is no stenosis. The mean pressure gradient across   the mitral valve is 1 mmHg at a heart rate of  bpm.    Pulmonic Valve: There is no stenosis. There is mild regurgitation with   a centrally directed jet.    Pulmonary Artery: The estimated pulmonary artery systolic pressure is   16 mmHg.    IVC/SVC: Normal venous pressure at 3 mmHg.

## 2023-09-15 NOTE — HOSPITAL COURSE
9/14/2023 STEMI with emergent LHC with successful RCA/PLV stent. Admitted to ICU. BP initially 150s-160s and trended down with oral regimen. Remains chest pain free. Echo with normal LVEF. HR stable. No arrhythmias noted on telemetry. Continue GDMT and suitable for transfer to the floor. If remains stable overnight and able to ambulate with no issues. Anticipate discharge tomorrow   9/15/2023 Transferred to floor yesterday Chest pain free overnight. HR and BP stable. Echo with normal LVEF. CBC and BMP WNL

## 2023-09-16 LAB — POCT GLUCOSE: 147 MG/DL (ref 70–110)

## 2023-09-27 ENCOUNTER — OFFICE VISIT (OUTPATIENT)
Dept: CARDIOLOGY | Facility: CLINIC | Age: 73
End: 2023-09-27
Payer: MEDICARE

## 2023-09-27 VITALS
OXYGEN SATURATION: 98 % | DIASTOLIC BLOOD PRESSURE: 81 MMHG | SYSTOLIC BLOOD PRESSURE: 124 MMHG | WEIGHT: 224.88 LBS | HEIGHT: 70 IN | BODY MASS INDEX: 32.19 KG/M2 | HEART RATE: 76 BPM

## 2023-09-27 DIAGNOSIS — I21.11 ST ELEVATION MYOCARDIAL INFARCTION INVOLVING RIGHT CORONARY ARTERY: ICD-10-CM

## 2023-09-27 DIAGNOSIS — E78.2 MIXED HYPERLIPIDEMIA: ICD-10-CM

## 2023-09-27 DIAGNOSIS — I25.10 CORONARY ARTERY DISEASE INVOLVING NATIVE CORONARY ARTERY OF NATIVE HEART WITHOUT ANGINA PECTORIS: ICD-10-CM

## 2023-09-27 DIAGNOSIS — E66.09 CLASS 1 OBESITY DUE TO EXCESS CALORIES WITH SERIOUS COMORBIDITY AND BODY MASS INDEX (BMI) OF 32.0 TO 32.9 IN ADULT: ICD-10-CM

## 2023-09-27 DIAGNOSIS — I10 ESSENTIAL HYPERTENSION: ICD-10-CM

## 2023-09-27 PROCEDURE — 3288F FALL RISK ASSESSMENT DOCD: CPT | Mod: CPTII,S$GLB,, | Performed by: INTERNAL MEDICINE

## 2023-09-27 PROCEDURE — 99999 PR PBB SHADOW E&M-EST. PATIENT-LVL III: CPT | Mod: PBBFAC,,, | Performed by: INTERNAL MEDICINE

## 2023-09-27 PROCEDURE — 1160F RVW MEDS BY RX/DR IN RCRD: CPT | Mod: CPTII,S$GLB,, | Performed by: INTERNAL MEDICINE

## 2023-09-27 PROCEDURE — 1101F PT FALLS ASSESS-DOCD LE1/YR: CPT | Mod: CPTII,S$GLB,, | Performed by: INTERNAL MEDICINE

## 2023-09-27 PROCEDURE — 1125F AMNT PAIN NOTED PAIN PRSNT: CPT | Mod: CPTII,S$GLB,, | Performed by: INTERNAL MEDICINE

## 2023-09-27 PROCEDURE — 3044F PR MOST RECENT HEMOGLOBIN A1C LEVEL <7.0%: ICD-10-PCS | Mod: CPTII,S$GLB,, | Performed by: INTERNAL MEDICINE

## 2023-09-27 PROCEDURE — 1101F PR PT FALLS ASSESS DOC 0-1 FALLS W/OUT INJ PAST YR: ICD-10-PCS | Mod: CPTII,S$GLB,, | Performed by: INTERNAL MEDICINE

## 2023-09-27 PROCEDURE — 3008F PR BODY MASS INDEX (BMI) DOCUMENTED: ICD-10-PCS | Mod: CPTII,S$GLB,, | Performed by: INTERNAL MEDICINE

## 2023-09-27 PROCEDURE — 3008F BODY MASS INDEX DOCD: CPT | Mod: CPTII,S$GLB,, | Performed by: INTERNAL MEDICINE

## 2023-09-27 PROCEDURE — 1125F PR PAIN SEVERITY QUANTIFIED, PAIN PRESENT: ICD-10-PCS | Mod: CPTII,S$GLB,, | Performed by: INTERNAL MEDICINE

## 2023-09-27 PROCEDURE — 1111F PR DISCHARGE MEDS RECONCILED W/ CURRENT OUTPATIENT MED LIST: ICD-10-PCS | Mod: CPTII,S$GLB,, | Performed by: INTERNAL MEDICINE

## 2023-09-27 PROCEDURE — 3288F PR FALLS RISK ASSESSMENT DOCUMENTED: ICD-10-PCS | Mod: CPTII,S$GLB,, | Performed by: INTERNAL MEDICINE

## 2023-09-27 PROCEDURE — 1160F PR REVIEW ALL MEDS BY PRESCRIBER/CLIN PHARMACIST DOCUMENTED: ICD-10-PCS | Mod: CPTII,S$GLB,, | Performed by: INTERNAL MEDICINE

## 2023-09-27 PROCEDURE — 3079F PR MOST RECENT DIASTOLIC BLOOD PRESSURE 80-89 MM HG: ICD-10-PCS | Mod: CPTII,S$GLB,, | Performed by: INTERNAL MEDICINE

## 2023-09-27 PROCEDURE — 99214 OFFICE O/P EST MOD 30 MIN: CPT | Mod: S$GLB,,, | Performed by: INTERNAL MEDICINE

## 2023-09-27 PROCEDURE — 4010F ACE/ARB THERAPY RXD/TAKEN: CPT | Mod: CPTII,S$GLB,, | Performed by: INTERNAL MEDICINE

## 2023-09-27 PROCEDURE — 3044F HG A1C LEVEL LT 7.0%: CPT | Mod: CPTII,S$GLB,, | Performed by: INTERNAL MEDICINE

## 2023-09-27 PROCEDURE — 3074F PR MOST RECENT SYSTOLIC BLOOD PRESSURE < 130 MM HG: ICD-10-PCS | Mod: CPTII,S$GLB,, | Performed by: INTERNAL MEDICINE

## 2023-09-27 PROCEDURE — 3079F DIAST BP 80-89 MM HG: CPT | Mod: CPTII,S$GLB,, | Performed by: INTERNAL MEDICINE

## 2023-09-27 PROCEDURE — 99214 PR OFFICE/OUTPT VISIT, EST, LEVL IV, 30-39 MIN: ICD-10-PCS | Mod: S$GLB,,, | Performed by: INTERNAL MEDICINE

## 2023-09-27 PROCEDURE — 99999 PR PBB SHADOW E&M-EST. PATIENT-LVL III: ICD-10-PCS | Mod: PBBFAC,,, | Performed by: INTERNAL MEDICINE

## 2023-09-27 PROCEDURE — 1111F DSCHRG MED/CURRENT MED MERGE: CPT | Mod: CPTII,S$GLB,, | Performed by: INTERNAL MEDICINE

## 2023-09-27 PROCEDURE — 1159F MED LIST DOCD IN RCRD: CPT | Mod: CPTII,S$GLB,, | Performed by: INTERNAL MEDICINE

## 2023-09-27 PROCEDURE — 4010F PR ACE/ARB THEARPY RXD/TAKEN: ICD-10-PCS | Mod: CPTII,S$GLB,, | Performed by: INTERNAL MEDICINE

## 2023-09-27 PROCEDURE — 3074F SYST BP LT 130 MM HG: CPT | Mod: CPTII,S$GLB,, | Performed by: INTERNAL MEDICINE

## 2023-09-27 PROCEDURE — 1159F PR MEDICATION LIST DOCUMENTED IN MEDICAL RECORD: ICD-10-PCS | Mod: CPTII,S$GLB,, | Performed by: INTERNAL MEDICINE

## 2023-09-27 NOTE — PROGRESS NOTES
Subjective:    Patient ID:  Luis Mendez is a 73 y.o. male who presents for follow-up of Hospital Follow Up      PCP: Karen Aragon MD     HPI  Pt is a 74 yo M w/ PMH of HTN, HLD, CAD s/p PCI to pRCA and RPLV, and Class 1 Obesity who presents for evaluation s/p hospitalization. He was admitted 9/13/2023-9/15/2023 for an inferior STEMI and is s/p PCI to pRCA and RPLV and was started on medical therapy.  Since d/c he mentions that he has been doing well however notes some de santiago.  He denies cp, edema, orthopnea, PND, presyncope, LOC, palpitations, and claudication.  He notes compliance w/ meds and denies side effects.  He monitors his BP at home and states it runs 120s/80s.  He does not exercise however states he is active w/ walking but notes some de santiago following activity.         Past Medical History:   Diagnosis Date    Hypertension     Prostate cancer      Past Surgical History:   Procedure Laterality Date    APPENDECTOMY      CLOSURE DEVICE  9/14/2023    Procedure: Placement of Closure Device;  Surgeon: Moustapha Mitchell III, MD;  Location: [facility] CATH LAB/EP;  Service: Cardiology;;    COLONOSCOPY N/A 5/15/2018    Procedure: COLONOSCOPY;  Surgeon: Edison Amado Jr., MD;  Location: [facility] ENDO;  Service: Endoscopy;  Laterality: N/A;    IVUS, CORONARY  9/14/2023    Procedure: IVUS, Coronary;  Surgeon: Moustapha Mitchell III, MD;  Location: [facility] CATH LAB/EP;  Service: Cardiology;;    LEFT HEART CATHETERIZATION N/A 9/14/2023    Procedure: Left heart cath;  Surgeon: Moustapha Mitchell III, MD;  Location: [facility] CATH LAB/EP;  Service: Cardiology;  Laterality: N/A;    PROSTATE SURGERY      PTCA, SINGLE VESSEL  9/14/2023    Procedure: PTCA, Single Vessel;  Surgeon: Moustapha Mitchell III, MD;  Location: [facility] CATH LAB/EP;  Service: Cardiology;;    STENT, DRUG ELUTING, MULTI VESSEL, CORONARY  9/14/2023    Procedure: Stent, Drug Eluting, Multi Vessel, Coronary;  Surgeon: Moustapha Mitchell III, MD;  Location: [facility]  CATH LAB/EP;  Service: Cardiology;;     Social History     Socioeconomic History    Marital status:    Tobacco Use    Smoking status: Former    Smokeless tobacco: Never   Substance and Sexual Activity    Alcohol use: No    Drug use: No    Sexual activity: Yes     Partners: Female     Social Determinants of Health     Financial Resource Strain: Low Risk  (9/14/2023)    Overall Financial Resource Strain (CARDIA)     Difficulty of Paying Living Expenses: Not hard at all   Food Insecurity: No Food Insecurity (9/14/2023)    Hunger Vital Sign     Worried About Running Out of Food in the Last Year: Never true     Ran Out of Food in the Last Year: Never true   Transportation Needs: No Transportation Needs (9/14/2023)    PRAPARE - Transportation     Lack of Transportation (Medical): No     Lack of Transportation (Non-Medical): No   Physical Activity: Insufficiently Active (9/14/2023)    Exercise Vital Sign     Days of Exercise per Week: 2 days     Minutes of Exercise per Session: 30 min   Social Connections: Unknown (9/14/2023)    Social Connection and Isolation Panel [NHANES]     Frequency of Communication with Friends and Family: More than three times a week     Frequency of Social Gatherings with Friends and Family: More than three times a week     Marital Status:    Housing Stability: Low Risk  (9/14/2023)    Housing Stability Vital Sign     Unable to Pay for Housing in the Last Year: No     Number of Places Lived in the Last Year: 1     Unstable Housing in the Last Year: No     Family History   Problem Relation Age of Onset    No Known Problems Mother     No Known Problems Father     No Known Problems Sister        Review of patient's allergies indicates:  No Known Allergies    Medication List with Changes/Refills   Current Medications    AMLODIPINE (NORVASC) 10 MG TABLET    Take 1 tablet (10 mg total) by mouth once daily.    ASPIRIN (ECOTRIN) 81 MG EC TABLET    Take 1 tablet (81 mg total) by mouth once  "daily.    ATORVASTATIN (LIPITOR) 80 MG TABLET    Take 1 tablet (80 mg total) by mouth once daily.    CARVEDILOL (COREG) 25 MG TABLET    Take 1 tablet (25 mg total) by mouth 2 (two) times daily.    HYDROCHLOROTHIAZIDE (HYDRODIURIL) 25 MG TABLET    Take 1 tablet (25 mg total) by mouth once daily.    LOSARTAN (COZAAR) 100 MG TABLET    Take 1 tablet (100 mg total) by mouth once daily.    TICAGRELOR (BRILINTA) 90 MG TABLET    Take 1 tablet (90 mg total) by mouth 2 (two) times daily.   Discontinued Medications    PANTOPRAZOLE (PROTONIX) 40 MG TABLET    Take 40 mg by mouth 2 (two) times daily.       Review of Systems   Constitutional: Negative for diaphoresis and fever.   HENT:  Negative for congestion and hearing loss.    Eyes:  Negative for blurred vision and pain.   Cardiovascular:  Positive for dyspnea on exertion. Negative for chest pain, claudication, leg swelling, near-syncope, palpitations and syncope.   Respiratory:  Negative for shortness of breath and sleep disturbances due to breathing.    Hematologic/Lymphatic: Negative for bleeding problem. Does not bruise/bleed easily.   Skin:  Negative for color change and poor wound healing.   Gastrointestinal:  Negative for abdominal pain and nausea.   Genitourinary:  Negative for bladder incontinence and flank pain.   Neurological:  Negative for focal weakness and light-headedness.        Objective:   /81 (BP Location: Left arm, Patient Position: Sitting, BP Method: Large (Automatic))   Pulse 76   Ht 5' 10" (1.778 m)   Wt 102 kg (224 lb 13.9 oz)   SpO2 98%   BMI 32.27 kg/m²    Physical Exam  Constitutional:       Appearance: He is well-developed. He is not diaphoretic.   HENT:      Head: Normocephalic and atraumatic.   Eyes:      General: No scleral icterus.     Pupils: Pupils are equal, round, and reactive to light.   Neck:      Vascular: No JVD.   Cardiovascular:      Rate and Rhythm: Normal rate and regular rhythm.      Pulses: Intact distal pulses.      " Heart sounds: S1 normal and S2 normal. No murmur heard.     No friction rub. No gallop.   Pulmonary:      Effort: Pulmonary effort is normal. No respiratory distress.      Breath sounds: Normal breath sounds. No wheezing or rales.   Chest:      Chest wall: No tenderness.   Abdominal:      General: Bowel sounds are normal. There is no distension.      Palpations: Abdomen is soft. There is no mass.      Tenderness: There is no abdominal tenderness. There is no rebound.   Musculoskeletal:         General: No tenderness. Normal range of motion.      Cervical back: Normal range of motion and neck supple.      Right lower leg: No edema.      Left lower leg: No edema.   Skin:     General: Skin is warm and dry.      Coloration: Skin is not pale.   Neurological:      Mental Status: He is alert and oriented to person, place, and time.      Coordination: Coordination normal.      Deep Tendon Reflexes: Reflexes normal.   Psychiatric:         Behavior: Behavior normal.         Judgment: Judgment normal.           EC2023- reviewed.  Sinus rhythm w/ 1st AV block, Inferior infarct, cannot r/o anterior infarct.      Echo: 2023- reviewed.    Summary      Left Ventricle: The left ventricle is normal in size. Normal wall thickness. There is concentric remodeling. Normal wall motion. There is normal systolic function with a visually estimated ejection fraction of 60 - 65%. Biplane (2D) method of discs ejection fraction is 65%.    Right Ventricle: Normal right ventricular cavity size. Systolic function is normal.    Aortic Valve: The aortic valve is a trileaflet valve.    Mitral Valve: There is no stenosis. The mean pressure gradient across the mitral valve is 1 mmHg at a heart rate of  bpm.    Pulmonic Valve: There is no stenosis. There is mild regurgitation with a centrally directed jet.    Pulmonary Artery: The estimated pulmonary artery systolic pressure is 16 mmHg.    IVC/SVC: Normal venous pressure at 3 mmHg.     LHC:  9/14/2023- reviewed.    Summary      There was two vessel coronary artery disease with 90% tubular stenosis of the prox RCA and 100% thrombotic stenois of the prox RPLV (culprit) and 95% discrete stenosis of the distal LAD.    Successful PCI of the prox RCA with a 4.5x16 mm VENU and of the prox RPLV with a 2.75x16 mm VENU with excellent angiographic results.    The left ventricular end diastolic pressure was elevated, 20 mmHg.    The RPDA lesion was 70% stenosed.    The Prox RCA lesion was 90% stenosed with 0% stenosis post-intervention.    The Dist LAD lesion was 95% stenosed.    The RPAV lesion was 100% stenosed with 0% stenosis post-intervention.    Prox RCA lesion: A SYS STENT MEGATRON 16X4.5MM stent was successfully placed at 18 YAMILE for 20 sec.    RPAV lesion: A STENT SYNERGY XD [id number] stent was successfully placed at 14 YAMILE for 10 sec.    The estimated blood loss was <50 mL.       Assessment:       1. Essential hypertension    2. Mixed hyperlipidemia    3. ST elevation myocardial infarction involving right coronary artery    4. Coronary artery disease involving native coronary artery of native heart without angina pectoris    5. Class 1 obesity due to excess calories with serious comorbidity and body mass index (BMI) of 32.0 to 32.9 in adult         Plan:         Essential hypertension  Goal BP < 130/80.  Compliant w/ meds.    - continue medical therapy   - risk factor and lifestyle modifications     Mixed hyperlipidemia  Goal LDL < 70, last LDL 88 9/2023.  Compliant w/ high intensity statin.    - continue medical therapy  - risk factor and lifestyle modifications     ST elevation myocardial infarction involving right coronary artery  There was two vessel coronary artery disease with 90% tubular stenosis of the prox RCA and 100% thrombotic stenois of the prox RPLV (culprit) and 95% discrete stenosis of the distal LAD.  Successful PCI of the prox RCA with a 4.5x16 mm VENU and of the prox RPLV with a 2.75x16 mm  VENU with excellent angiographic results.  - continue medical therapy   - risk factor and lifestyle modifications   - cardiac rehab ph 2 referral      Coronary artery disease involving native coronary artery of native heart without angina pectoris  Plan as per STEMI.     Class 1 obesity due to excess calories with serious comorbidity and body mass index (BMI) of 32.0 to 32.9 in adult  Encouraged lifestyle modifications (diet, exercise, and weight loss).        Total duration of face to face visit time 30 minutes.  Total time spent counseling greater than fifty percent of total visit time.  Counseling included discussion regarding imaging findings, diagnosis, possibilities, treatment options, risks and benefits.  The patient had many questions regarding the options and long-term effects      Moustapha Mitchell M.D.  Interventional Cardiology

## 2023-09-27 NOTE — ASSESSMENT & PLAN NOTE
There was two vessel coronary artery disease with 90% tubular stenosis of the prox RCA and 100% thrombotic stenois of the prox RPLV (culprit) and 95% discrete stenosis of the distal LAD.  Successful PCI of the prox RCA with a 4.5x16 mm VENU and of the prox RPLV with a 2.75x16 mm VENU with excellent angiographic results.  - continue medical therapy   - risk factor and lifestyle modifications   - cardiac rehab ph 2 referral

## 2023-09-27 NOTE — ASSESSMENT & PLAN NOTE
Goal LDL < 70, last LDL 88 9/2023.  Compliant w/ high intensity statin.    - continue medical therapy  - risk factor and lifestyle modifications

## 2023-09-28 ENCOUNTER — TELEPHONE (OUTPATIENT)
Dept: CARDIOLOGY | Facility: CLINIC | Age: 73
End: 2023-09-28
Payer: MEDICARE

## 2023-09-28 NOTE — TELEPHONE ENCOUNTER
Message sent to staff at [facility] for assistance with enrolling patient in cardiac rehab.

## 2023-12-18 PROBLEM — I21.19 ACUTE ST ELEVATION MYOCARDIAL INFARCTION (STEMI) OF INFERIOR WALL: Status: RESOLVED | Noted: 2023-09-14 | Resolved: 2023-12-18

## 2023-12-18 PROBLEM — I21.11 ST ELEVATION MYOCARDIAL INFARCTION INVOLVING RIGHT CORONARY ARTERY: Status: RESOLVED | Noted: 2023-09-14 | Resolved: 2023-12-18

## 2023-12-19 ENCOUNTER — OFFICE VISIT (OUTPATIENT)
Dept: CARDIOLOGY | Facility: CLINIC | Age: 73
End: 2023-12-19
Payer: MEDICARE

## 2023-12-19 VITALS
OXYGEN SATURATION: 96 % | DIASTOLIC BLOOD PRESSURE: 83 MMHG | SYSTOLIC BLOOD PRESSURE: 144 MMHG | HEIGHT: 70 IN | WEIGHT: 218.94 LBS | HEART RATE: 86 BPM | BODY MASS INDEX: 31.34 KG/M2

## 2023-12-19 DIAGNOSIS — I21.11 ST ELEVATION MYOCARDIAL INFARCTION INVOLVING RIGHT CORONARY ARTERY: Primary | ICD-10-CM

## 2023-12-19 DIAGNOSIS — I10 PRIMARY HYPERTENSION: ICD-10-CM

## 2023-12-19 DIAGNOSIS — I25.10 CORONARY ARTERY DISEASE INVOLVING NATIVE CORONARY ARTERY OF NATIVE HEART WITHOUT ANGINA PECTORIS: ICD-10-CM

## 2023-12-19 PROCEDURE — 3008F PR BODY MASS INDEX (BMI) DOCUMENTED: ICD-10-PCS | Mod: CPTII,S$GLB,, | Performed by: INTERNAL MEDICINE

## 2023-12-19 PROCEDURE — 1159F MED LIST DOCD IN RCRD: CPT | Mod: CPTII,S$GLB,, | Performed by: INTERNAL MEDICINE

## 2023-12-19 PROCEDURE — 1126F PR PAIN SEVERITY QUANTIFIED, NO PAIN PRESENT: ICD-10-PCS | Mod: CPTII,S$GLB,, | Performed by: INTERNAL MEDICINE

## 2023-12-19 PROCEDURE — 3077F SYST BP >= 140 MM HG: CPT | Mod: CPTII,S$GLB,, | Performed by: INTERNAL MEDICINE

## 2023-12-19 PROCEDURE — 1126F AMNT PAIN NOTED NONE PRSNT: CPT | Mod: CPTII,S$GLB,, | Performed by: INTERNAL MEDICINE

## 2023-12-19 PROCEDURE — 4010F ACE/ARB THERAPY RXD/TAKEN: CPT | Mod: CPTII,S$GLB,, | Performed by: INTERNAL MEDICINE

## 2023-12-19 PROCEDURE — 99999 PR PBB SHADOW E&M-EST. PATIENT-LVL III: ICD-10-PCS | Mod: PBBFAC,,, | Performed by: INTERNAL MEDICINE

## 2023-12-19 PROCEDURE — 1159F PR MEDICATION LIST DOCUMENTED IN MEDICAL RECORD: ICD-10-PCS | Mod: CPTII,S$GLB,, | Performed by: INTERNAL MEDICINE

## 2023-12-19 PROCEDURE — 3079F DIAST BP 80-89 MM HG: CPT | Mod: CPTII,S$GLB,, | Performed by: INTERNAL MEDICINE

## 2023-12-19 PROCEDURE — 99214 OFFICE O/P EST MOD 30 MIN: CPT | Mod: S$GLB,,, | Performed by: INTERNAL MEDICINE

## 2023-12-19 PROCEDURE — 4010F PR ACE/ARB THEARPY RXD/TAKEN: ICD-10-PCS | Mod: CPTII,S$GLB,, | Performed by: INTERNAL MEDICINE

## 2023-12-19 PROCEDURE — 99999 PR PBB SHADOW E&M-EST. PATIENT-LVL III: CPT | Mod: PBBFAC,,, | Performed by: INTERNAL MEDICINE

## 2023-12-19 PROCEDURE — 1160F PR REVIEW ALL MEDS BY PRESCRIBER/CLIN PHARMACIST DOCUMENTED: ICD-10-PCS | Mod: CPTII,S$GLB,, | Performed by: INTERNAL MEDICINE

## 2023-12-19 PROCEDURE — 1160F RVW MEDS BY RX/DR IN RCRD: CPT | Mod: CPTII,S$GLB,, | Performed by: INTERNAL MEDICINE

## 2023-12-19 PROCEDURE — 3079F PR MOST RECENT DIASTOLIC BLOOD PRESSURE 80-89 MM HG: ICD-10-PCS | Mod: CPTII,S$GLB,, | Performed by: INTERNAL MEDICINE

## 2023-12-19 PROCEDURE — 3288F PR FALLS RISK ASSESSMENT DOCUMENTED: ICD-10-PCS | Mod: CPTII,S$GLB,, | Performed by: INTERNAL MEDICINE

## 2023-12-19 PROCEDURE — 3077F PR MOST RECENT SYSTOLIC BLOOD PRESSURE >= 140 MM HG: ICD-10-PCS | Mod: CPTII,S$GLB,, | Performed by: INTERNAL MEDICINE

## 2023-12-19 PROCEDURE — 1101F PT FALLS ASSESS-DOCD LE1/YR: CPT | Mod: CPTII,S$GLB,, | Performed by: INTERNAL MEDICINE

## 2023-12-19 PROCEDURE — 3044F HG A1C LEVEL LT 7.0%: CPT | Mod: CPTII,S$GLB,, | Performed by: INTERNAL MEDICINE

## 2023-12-19 PROCEDURE — 3008F BODY MASS INDEX DOCD: CPT | Mod: CPTII,S$GLB,, | Performed by: INTERNAL MEDICINE

## 2023-12-19 PROCEDURE — 3044F PR MOST RECENT HEMOGLOBIN A1C LEVEL <7.0%: ICD-10-PCS | Mod: CPTII,S$GLB,, | Performed by: INTERNAL MEDICINE

## 2023-12-19 PROCEDURE — 99214 PR OFFICE/OUTPT VISIT, EST, LEVL IV, 30-39 MIN: ICD-10-PCS | Mod: S$GLB,,, | Performed by: INTERNAL MEDICINE

## 2023-12-19 PROCEDURE — 3288F FALL RISK ASSESSMENT DOCD: CPT | Mod: CPTII,S$GLB,, | Performed by: INTERNAL MEDICINE

## 2023-12-19 PROCEDURE — 1101F PR PT FALLS ASSESS DOC 0-1 FALLS W/OUT INJ PAST YR: ICD-10-PCS | Mod: CPTII,S$GLB,, | Performed by: INTERNAL MEDICINE

## 2023-12-19 NOTE — PROGRESS NOTES
[facility] Cardiology [id number]     SUBJECTIVE:     History of Present Illness:  Patient is a 73 y.o. male presents to establish cardiac care . 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 without any issues   Blood pressures in the 130's   Review of patient's allergies indicates:  No Known Allergies    Past Medical History:   Diagnosis Date    Hypertension     Prostate cancer        Past Surgical History:   Procedure Laterality Date    APPENDECTOMY      CLOSURE DEVICE  9/14/2023    Procedure: Placement of Closure Device;  Surgeon: Moustapha Mitchell III, MD;  Location: [facility] CATH LAB/EP;  Service: Cardiology;;    COLONOSCOPY N/A 5/15/2018    Procedure: COLONOSCOPY;  Surgeon: Edison Amado Jr., MD;  Location: [facility] ENDO;  Service: Endoscopy;  Laterality: N/A;    IVUS, CORONARY  9/14/2023    Procedure: IVUS, Coronary;  Surgeon: Moustapha Mitchell III, MD;  Location: [facility] CATH LAB/EP;  Service: Cardiology;;    LEFT HEART CATHETERIZATION N/A 9/14/2023    Procedure: Left heart cath;  Surgeon: Moustapha Mitchell III, MD;  Location: [facility] CATH LAB/EP;  Service: Cardiology;  Laterality: N/A;    PROSTATE SURGERY      PTCA, SINGLE VESSEL  9/14/2023    Procedure: PTCA, Single Vessel;  Surgeon: Moustapha Mitchell III, MD;  Location: [facility] CATH LAB/EP;  Service: Cardiology;;    STENT, DRUG ELUTING, MULTI VESSEL, CORONARY  9/14/2023    Procedure: Stent, Drug Eluting, Multi Vessel, Coronary;  Surgeon: Moustapha Mitchell III, MD;  Location: [facility] CATH LAB/EP;  Service: Cardiology;;           Past Hospitalization:         Cardiac meds:  ASA 81 mg  Atorvastatin 80 mg  Carvedilol 25 mg BID  HCTZ 25 mg   Losartan 100 mg  Brilinta 90 mg  BID        OBJECTIVE:     Vital Signs (Most Recent)  There were no vitals filed for this visit.      Physical Exam:  Neck: normal carotids, no bruits; normal JVP  Lungs :clear  Heart: RR, normal S1,S2, no murmurs, no gallops  Abd: no masses; no bruits;   Exts: normal DP and PT pulses bilaterally, normal radials; no edema noted               Labs      Diagnostic Results:    1.EK23: OK;   1b. EK23: OK  1c. EK/15/23: T wave inversion inferiorly   2.Echo: : normal EF; normal valves   3. Stress test  4. Cath:  for STEMI inferior leads; VENU placed right and PLV ; 95% distal LAD; stent placed proximall and in the plV brand; right PDA 70% CX nonobstructive prior to large OM which was fine;     Chart review:        ASSESSMENT/PLAN:     Hypertension: controlled  DM: borderline: A1c 5.7  Last LDL 88 prior to statin   No symptoms of angina or failure  Distal LAD at apex disease noted     Plan: continue the same medications  Return 6 months     Leslye Chavez MD

## 2024-02-19 ENCOUNTER — TELEPHONE (OUTPATIENT)
Dept: CARDIOLOGY | Facility: CLINIC | Age: 74
End: 2024-02-19

## 2024-02-19 NOTE — TELEPHONE ENCOUNTER
Pt called regarding he needed to knw if he needed any labs upon his upcoming appt I stated to pt no she will order any labs if she need to at the appt

## 2024-02-19 NOTE — TELEPHONE ENCOUNTER
----- Message from Lor Saxena sent at 2/19/2024  8:51 AM CST -----  Type:  Needs Medical Advice    Who Called: pt  Would the patient rather a call back or a response via [facility]? call  Best Call Back Number:  [phone]  Additional Information: pt would like to know if he needs to do anything far as blood work before appt schedule for 3/27

## 2024-03-27 ENCOUNTER — OFFICE VISIT (OUTPATIENT)
Dept: CARDIOLOGY | Facility: CLINIC | Age: 74
End: 2024-03-27

## 2024-03-27 VITALS
OXYGEN SATURATION: 97 % | WEIGHT: 214.63 LBS | DIASTOLIC BLOOD PRESSURE: 88 MMHG | SYSTOLIC BLOOD PRESSURE: 163 MMHG | HEART RATE: 70 BPM | BODY MASS INDEX: 30.73 KG/M2 | HEIGHT: 70 IN

## 2024-03-27 DIAGNOSIS — I25.10 CORONARY ARTERY DISEASE INVOLVING NATIVE CORONARY ARTERY OF NATIVE HEART WITHOUT ANGINA PECTORIS: ICD-10-CM

## 2024-03-27 DIAGNOSIS — I21.11 ST ELEVATION MYOCARDIAL INFARCTION INVOLVING RIGHT CORONARY ARTERY: Primary | ICD-10-CM

## 2024-03-27 DIAGNOSIS — E78.2 MIXED HYPERLIPIDEMIA: ICD-10-CM

## 2024-03-27 DIAGNOSIS — I10 PRIMARY HYPERTENSION: ICD-10-CM

## 2024-03-27 PROCEDURE — 99213 OFFICE O/P EST LOW 20 MIN: CPT | Mod: PBBFAC,PN | Performed by: INTERNAL MEDICINE

## 2024-03-27 PROCEDURE — 99999 PR PBB SHADOW E&M-EST. PATIENT-LVL III: CPT | Mod: PBBFAC,,, | Performed by: INTERNAL MEDICINE

## 2024-03-27 PROCEDURE — 99214 OFFICE O/P EST MOD 30 MIN: CPT | Mod: S$PBB,,, | Performed by: INTERNAL MEDICINE

## 2024-03-27 NOTE — PROGRESS NOTES
[facility] Cardiology 701     SUBJECTIVE:     History of Present Illness:  Patient is a 73 y.o. male presents to establish cardiac care . Previously seen by . Occasionally pressure in chest when relaxed. 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 without any issues   Blood pressures in the 130's   Review of patient's allergies indicates:  No Known Allergies    Past Medical History:   Diagnosis Date    Hypertension     Prostate cancer        Past Surgical History:   Procedure Laterality Date    APPENDECTOMY      CLOSURE DEVICE  9/14/2023    Procedure: Placement of Closure Device;  Surgeon: Moustapha Mitchell III, MD;  Location: [facility] CATH LAB/EP;  Service: Cardiology;;    COLONOSCOPY N/A 5/15/2018    Procedure: COLONOSCOPY;  Surgeon: Edison Amado Jr., MD;  Location: [facility] ENDO;  Service: Endoscopy;  Laterality: N/A;    IVUS, CORONARY  9/14/2023    Procedure: IVUS, Coronary;  Surgeon: Moustapha Mitchell III, MD;  Location: [facility] CATH LAB/EP;  Service: Cardiology;;    LEFT HEART CATHETERIZATION N/A 9/14/2023    Procedure: Left heart cath;  Surgeon: Moustapha Mitchell III, MD;  Location: [facility] CATH LAB/EP;  Service: Cardiology;  Laterality: N/A;    PROSTATE SURGERY      PTCA, SINGLE VESSEL  9/14/2023    Procedure: PTCA, Single Vessel;  Surgeon: Moustapha Mitchell III, MD;  Location: [facility] CATH LAB/EP;  Service: Cardiology;;    STENT, DRUG ELUTING, MULTI VESSEL, CORONARY  9/14/2023    Procedure: Stent, Drug Eluting, Multi Vessel, Coronary;  Surgeon: Moustapha Mitchell III, MD;  Location: [facility] CATH LAB/EP;  Service: Cardiology;;           Past Hospitalization:         Cardiac  "meds:  ASA 81 mg  Atorvastatin 80 mg  Carvedilol 25 mg BID  HCTZ 25 mg   Losartan 100 mg  Brilinta 90 mg BID        OBJECTIVE:     Vital Signs (Most Recent)  Vitals:    24 1007   BP: (!) 163/88   Pulse: 70   SpO2: 97%   Weight: 97.3 kg (214 lb 9.9 oz)   Height: 5' 10" (1.778 m)         Physical Exam:  Neck: normal carotids, no bruits; normal JVP  Lungs :clear  Heart: RR, normal S1,S2, no murmurs, no gallops  Abd: no masses; no bruits;   Exts: normal DP and PT pulses bilaterally, normal radials; no edema noted               Labs    : LDL 88; A1c 5.7; GFR 59  Diagnostic Results:    1.EK23: OK;   1b. EK23: OK  1c. EK/15/23: T wave inversion inferiorly   2.Echo: : normal EF; normal valves   3. Stress test  4. Cath:  for STEMI inferior leads; left main: normal; LAD: distal 95% near apex; CX OK; Right: prox 90%;stent placed: PDA 70% ; distal right 100% stent placed     Chart review:        ASSESSMENT/PLAN:     Hypertension: controlled  DM: borderline: A1c 5.7  Last LDL 88 prior to statin   No symptoms of angina or failure  Distal LAD at apex disease noted ; belching with discomfort once in a while noncardiac     Plan: continue the same medications  Return 6 months     Leslye Chavez MD    "

## 2024-08-08 ENCOUNTER — HOSPITAL ENCOUNTER (OUTPATIENT)
Facility: HOSPITAL | Age: 74
Discharge: HOME | End: 2024-08-09
Attending: INTERNAL MEDICINE | Admitting: INTERNAL MEDICINE
Payer: MEDICARE

## 2024-08-08 DIAGNOSIS — N17.9 AKI (ACUTE KIDNEY INJURY): ICD-10-CM

## 2024-08-08 DIAGNOSIS — R07.9 CHEST PAIN WITH HIGH RISK FOR CARDIAC ETIOLOGY: Primary | ICD-10-CM

## 2024-08-08 DIAGNOSIS — I25.10 ATHEROSCLEROSIS OF NATIVE CORONARY ARTERY OF NATIVE HEART WITHOUT ANGINA PECTORIS: ICD-10-CM

## 2024-08-08 DIAGNOSIS — R07.81 RIB PAIN: ICD-10-CM

## 2024-08-08 LAB
ALBUMIN SERPL BCP-MCNC: 4.4 G/DL (ref 3.5–5.2)
ALP SERPL-CCNC: 117 U/L (ref 38–126)
ALT SERPL W/O P-5'-P-CCNC: 39 U/L (ref 10–44)
ANION GAP SERPL CALC-SCNC: 13 MMOL/L (ref 8–16)
AST SERPL-CCNC: 40 U/L (ref 15–46)
BASOPHILS # BLD AUTO: 0.02 K/UL (ref 0–0.2)
BASOPHILS NFR BLD: 0.4 % (ref 0–1.9)
BILIRUB SERPL-MCNC: 0.6 MG/DL (ref 0.1–1)
CALCIUM SERPL-MCNC: 9.4 MG/DL (ref 8.7–10.5)
CHLORIDE SERPL-SCNC: 105 MMOL/L (ref 95–110)
CO2 SERPL-SCNC: 25 MMOL/L (ref 23–29)
CREAT SERPL-MCNC: 1.61 MG/DL (ref 0.5–1.4)
DIFFERENTIAL METHOD BLD: ABNORMAL
EOSINOPHIL # BLD AUTO: 0.1 K/UL (ref 0–0.5)
EOSINOPHIL NFR BLD: 2.7 % (ref 0–8)
ERYTHROCYTE [DISTWIDTH] IN BLOOD BY AUTOMATED COUNT: 12.1 % (ref 11.5–14.5)
EST. GFR  (NO RACE VARIABLE): 45.4 ML/MIN/1.73 M^2
GLUCOSE SERPL-MCNC: 113 MG/DL (ref 70–110)
HCT VFR BLD AUTO: 41.4 % (ref 40–54)
HGB BLD-MCNC: 13.8 G/DL (ref 14–18)
IMM GRANULOCYTES # BLD AUTO: 0 K/UL (ref 0–0.04)
IMM GRANULOCYTES NFR BLD AUTO: 0 % (ref 0–0.5)
LYMPHOCYTES # BLD AUTO: 1.7 K/UL (ref 1–4.8)
LYMPHOCYTES NFR BLD: 36.2 % (ref 18–48)
MCH RBC QN AUTO: 31.2 PG (ref 27–31)
MCHC RBC AUTO-ENTMCNC: 33.3 G/DL (ref 32–36)
MCV RBC AUTO: 94 FL (ref 82–98)
MONOCYTES # BLD AUTO: 0.5 K/UL (ref 0.3–1)
MONOCYTES NFR BLD: 10.8 % (ref 4–15)
NEUTROPHILS # BLD AUTO: 2.4 K/UL (ref 1.8–7.7)
NEUTROPHILS NFR BLD: 49.9 % (ref 38–73)
NRBC BLD-RTO: 0 /100 WBC
PLATELET # BLD AUTO: 241 K/UL (ref 150–450)
PMV BLD AUTO: 10 FL (ref 9.2–12.9)
POTASSIUM SERPL-SCNC: 3.4 MMOL/L (ref 3.5–5.1)
PROT SERPL-MCNC: 8.2 G/DL (ref 6–8.4)
RBC # BLD AUTO: 4.43 M/UL (ref 4.6–6.2)
SODIUM SERPL-SCNC: 143 MMOL/L (ref 136–145)
TROPONIN I SERPL-MCNC: <0.012 NG/ML (ref 0.01–0.03)
TROPONIN I SERPL-MCNC: <0.012 NG/ML (ref 0.01–0.03)
UUN UR-MCNC: 30 MG/DL (ref 2–20)
WBC # BLD AUTO: 4.73 K/UL (ref 3.9–12.7)

## 2024-08-08 PROCEDURE — 96361 HYDRATE IV INFUSION ADD-ON: CPT | Mod: ER

## 2024-08-08 PROCEDURE — 99285 EMERGENCY DEPT VISIT HI MDM: CPT | Mod: 25,ER

## 2024-08-08 PROCEDURE — 93005 ELECTROCARDIOGRAM TRACING: CPT | Mod: ER

## 2024-08-08 PROCEDURE — G0378 HOSPITAL OBSERVATION PER HR: HCPCS | Mod: ER

## 2024-08-08 PROCEDURE — 85025 COMPLETE CBC W/AUTO DIFF WBC: CPT | Mod: ER

## 2024-08-08 PROCEDURE — 25000003 PHARM REV CODE 250: Mod: ER

## 2024-08-08 PROCEDURE — 80053 COMPREHEN METABOLIC PANEL: CPT | Mod: ER

## 2024-08-08 PROCEDURE — 93010 ELECTROCARDIOGRAM REPORT: CPT | Mod: ,,, | Performed by: STUDENT IN AN ORGANIZED HEALTH CARE EDUCATION/TRAINING PROGRAM

## 2024-08-08 PROCEDURE — 84484 ASSAY OF TROPONIN QUANT: CPT | Mod: ER

## 2024-08-08 RX ORDER — PANTOPRAZOLE SODIUM 40 MG/1
40 TABLET, DELAYED RELEASE ORAL 2 TIMES DAILY
COMMUNITY
Start: 2024-03-12 | End: 2024-08-08

## 2024-08-08 RX ORDER — POTASSIUM CHLORIDE 20 MEQ/1
20 TABLET, EXTENDED RELEASE ORAL
Status: COMPLETED | OUTPATIENT
Start: 2024-08-08 | End: 2024-08-08

## 2024-08-08 RX ORDER — TRAMADOL HYDROCHLORIDE 50 MG/1
50 TABLET ORAL EVERY 12 HOURS PRN
COMMUNITY
Start: 2024-05-07

## 2024-08-08 RX ORDER — METHOCARBAMOL 500 MG/1
500 TABLET, FILM COATED ORAL 3 TIMES DAILY
COMMUNITY
Start: 2024-05-07

## 2024-08-08 RX ORDER — AMLODIPINE BESYLATE 10 MG/1
10 TABLET ORAL DAILY
COMMUNITY

## 2024-08-08 RX ORDER — CELECOXIB 200 MG/1
200 CAPSULE ORAL 2 TIMES DAILY
COMMUNITY
Start: 2024-05-07

## 2024-08-08 RX ADMIN — SODIUM CHLORIDE 500 ML: 9 INJECTION, SOLUTION INTRAVENOUS at 04:08

## 2024-08-08 RX ADMIN — POTASSIUM CHLORIDE 20 MEQ: 1500 TABLET, EXTENDED RELEASE ORAL at 04:08

## 2024-08-08 NOTE — Clinical Note
Diagnosis: TERESITA (acute kidney injury) [[id number]]   Future Attending Provider: JEB MORRIS [[id number]]

## 2024-08-08 NOTE — ED PROVIDER NOTES
Encounter Date: 8/8/2024       History     Chief Complaint   Patient presents with    Arm Pain     Pt is reporting sudden onset of L arm pain. Pt is also having pain on the left side of his rib cage. Denies injury     Luis Mendez is a 73 y.o. male  has a past medical history of STEMI, Hypertension and Prostate cancer. presenting to the Emergency Department for sudden onset of left rib and left upper arm pain occurring around the an hour ago.  The pain was intermittent after onset.  Patient states every time the pain returned it was decreased from the prior episode.  Patient states he would hurt more with movement of his left arm.  Patient states the pain is gone now.  When the pain occurred, patient states he was walking to his car.  Patient still works actively for an [employer].  No shortness of breath, nausea, vomiting, diaphoresis, palpitations.        The history is provided by the patient.     Review of patient's allergies indicates:  No Known Allergies  Past Medical History:   Diagnosis Date    Hypertension     Prostate cancer      Past Surgical History:   Procedure Laterality Date    APPENDECTOMY      CLOSURE DEVICE  9/14/2023    Procedure: Placement of Closure Device;  Surgeon: Moustapha Mitchell III, MD;  Location: [facility] CATH LAB/EP;  Service: Cardiology;;    COLONOSCOPY N/A 5/15/2018    Procedure: COLONOSCOPY;  Surgeon: Edison Amado Jr., MD;  Location: [facility] ENDO;  Service: Endoscopy;  Laterality: N/A;    IVUS, CORONARY  9/14/2023    Procedure: IVUS, Coronary;  Surgeon: Moustapha Mitchell III, MD;  Location: [facility] CATH LAB/EP;  Service: Cardiology;;    LEFT HEART CATHETERIZATION N/A 9/14/2023    Procedure: Left heart cath;  Surgeon: Moustapha Mitchell III, MD;  Location: [facility] CATH LAB/EP;  Service: Cardiology;  Laterality: N/A;    PROSTATE SURGERY      PTCA, SINGLE VESSEL  9/14/2023    Procedure: PTCA, Single Vessel;  Surgeon: Moustapha Mitchell III, MD;  Location: [facility] CATH LAB/EP;  Service:  Cardiology;;    STENT, DRUG ELUTING, MULTI VESSEL, CORONARY  9/14/2023    Procedure: Stent, Drug Eluting, Multi Vessel, Coronary;  Surgeon: Moustapha Mitchell III, MD;  Location: [facility] CATH LAB/EP;  Service: Cardiology;;     Family History   Problem Relation Name Age of Onset    No Known Problems Mother      No Known Problems Father      No Known Problems Sister       Social History     Tobacco Use    Smoking status: Former    Smokeless tobacco: Never   Substance Use Topics    Alcohol use: No    Drug use: No     Review of Systems   Constitutional:  Negative for fever.   HENT:  Negative for sore throat.    Respiratory:  Negative for shortness of breath.    Cardiovascular:  Positive for chest pain.   Gastrointestinal:  Negative for nausea.   Genitourinary:  Negative for dysuria.   Musculoskeletal:  Positive for myalgias. Negative for back pain.   Skin:  Negative for rash.   Neurological:  Negative for weakness.   Hematological:  Does not bruise/bleed easily.   All other systems reviewed and are negative.      Physical Exam     Initial Vitals [08/08/24 1454]   BP Pulse Resp Temp SpO2   (!) 160/77 71 17 97.9 °F (36.6 °C) 99 %      MAP       --         Physical Exam    Nursing note and vitals reviewed.  Constitutional: He appears well-developed and well-nourished. He is not diaphoretic.  Non-toxic appearance. No distress.   HENT:   Head: Normocephalic and atraumatic.   Right Ear: External ear normal.   Left Ear: External ear normal.   Eyes: EOM are normal.   Neck: Neck supple.   Normal range of motion.  Cardiovascular:  Normal rate, regular rhythm, normal heart sounds and intact distal pulses.           Pulmonary/Chest: Breath sounds normal. No respiratory distress. He has no wheezes. He has no rales. He exhibits no tenderness and no bony tenderness.   Abdominal: Abdomen is soft. Bowel sounds are normal. He exhibits no distension. There is no abdominal tenderness. There is no rebound.   Musculoskeletal:         General:  Normal range of motion.      Cervical back: Normal range of motion and neck supple.     Neurological: He is alert and oriented to person, place, and time. GCS score is 15. GCS eye subscore is 4. GCS verbal subscore is 5. GCS motor subscore is 6.   Skin: Skin is dry.   Psychiatric: He has a normal mood and affect. His behavior is normal. Judgment and thought content normal.         ED Course   Critical Care    Date/Time: 8/8/2024 4:28 PM    Performed by: Joanne Almanza PA-C  Authorized by: Joanne Almanza PA-C  Direct patient critical care time: 10 minutes  Additional history critical care time: 5 minutes  Ordering / reviewing critical care time: 5 minutes  Documentation critical care time: 10 minutes  Consulting other physicians critical care time: 10 minutes  Total critical care time (exclusive of procedural time) : 40 minutes  Critical care was necessary to treat or prevent imminent or life-threatening deterioration of the following conditions: cardiac failure.  Critical care was time spent personally by me on the following activities: development of treatment plan with patient or surrogate, discussions with consultants, examination of patient, ordering and performing treatments and interventions, ordering and review of laboratory studies, ordering and review of radiographic studies, pulse oximetry and re-evaluation of patient's condition.        Labs Reviewed   CBC W/ AUTO DIFFERENTIAL - Abnormal       Result Value    WBC 4.73      RBC 4.43 (*)     Hemoglobin 13.8 (*)     Hematocrit 41.4      MCV 94      MCH 31.2 (*)     MCHC 33.3      RDW 12.1      Platelets 241      MPV 10.0      Immature Granulocytes 0.0      Gran # (ANC) 2.4      Immature Grans (Abs) 0.00      Lymph # 1.7      Mono # 0.5      Eos # 0.1      Baso # 0.02      nRBC 0      Gran % 49.9      Lymph % 36.2      Mono % 10.8      Eosinophil % 2.7      Basophil % 0.4      Differential Method Automated     COMPREHENSIVE METABOLIC PANEL - Abnormal    Sodium 143       Potassium 3.4 (*)     Chloride 105      CO2 25      Glucose 113 (*)     BUN 30 (*)     Creatinine 1.61 (*)     Calcium 9.4      Total Protein 8.2      Albumin 4.4      Total Bilirubin 0.6      Alkaline Phosphatase 117      AST 40      ALT 39      Anion Gap 13      eGFR 45.4 (*)    TROPONIN I    Troponin I <0.012     TROPONIN I          Imaging Results              X-Ray Chest AP Portable (Final result)  Result time 08/08/24 15:34:55      Final result by Moncho Caldwell MD (08/08/24 15:34:55)                   Impression:      No acute process seen.      Electronically signed by: Moncho Caldwell MD  Date:    08/08/2024  Time:    15:34               Narrative:    EXAMINATION:  XR CHEST AP PORTABLE    CLINICAL HISTORY:  Chest Pain;    FINDINGS:  Single view of the chest.  Comparison 09/15/2023    Cardiac silhouette is normal.  The lungs demonstrate no evidence of active disease.  No evidence of pleural effusion or pneumothorax.  Bones appear intact.                                       Medications   sodium chloride 0.9% bolus 500 mL 500 mL (500 mLs Intravenous New Bag 8/8/24 1632)   potassium chloride SA CR tablet 20 mEq (20 mEq Oral Given 8/8/24 1630)     Medical Decision Making  This is an emergent evaluation of a 73 y.o. male that presents to the Emergency Department for chest pain. Physical Exam shows a in no apparent distress, well developed and well nourished, non-toxic, in no respiratory distress and acyanotic, alert, oriented times 3, and afebrile male. Breath sounds clear and equal bilaterally, no increased work of breathing or respiratory distress. Heart sounds regular rate and rhythm. No murmurs. Abdomen soft and non tender. No palpable masses or bruits. Equal upper and lower extremity pulses, no ripping chest pain to the back. No dysphagia or vomiting. Vital Signs Are Reassuring. If available, previous records reviewed.     No evidence of volume overload or shock on exam. Low suspicion for acute PE,  pneumothorax, thoracic aortic dissection, cardiac effusion / tamponade. CXR without acute disease including on my independent interpretation.     My overall impression is TERESITA, high-risk chest pain.  Differential diagnosis including but not limited to pulmonary embolus, acute myocardial infarction, Boerhaeve syndrome, cardiac tamponade, thoracic artery dissection, acute coronary syndrome, pneumothorax, pneumonia, CHF, cardiac arrhythmia, or any other emergent cardiac, esophageal, pulmonary or aortic pathology.    All available findings were reviewed with the patient/family in detail along with the indications for hospitalization in order to receive CHEST PAIN evaluation with cardiac monitoring, serial troponin levels, and further cardiac investigation given elevated risk for MACE as determined by a HEART SCORE of 5.  All remaining questions and concerns were addressed at this time and the patient/family communicates understanding and agrees to proceed accordingly.  Similarly, all pertinent details of the encounter were discussed with Dr. Julien who agrees to receive the patient at [facility] for further care as outlined above.  The patient will be transferred by EMS secondary to a need for ongoing cardiac monitoring en route.       This case was discussed with my attending, Dr. Forde who is in agreement with my assessment and plan.        Amount and/or Complexity of Data Reviewed  External Data Reviewed: labs and notes.     Details: STEMI in September of 2023  Labs: ordered. Decision-making details documented in ED Course.  Radiology: ordered and independent interpretation performed. Decision-making details documented in ED Course.  ECG/medicine tests: ordered and independent interpretation performed. Decision-making details documented in ED Course.    Risk  OTC drugs.  Prescription drug management.  Decision regarding hospitalization.      Additional MDM:   Heart Score:    History:          Moderately  suspicious.  ECG:             Normal  Age:               >65 years  Risk factors: >= 3 risk factors or history of atherosclerotic disease  Troponin:       Less than or equal to normal limit  Heart Score = 5                ED Course as of 08/08/24 1659   Thu Aug 08, 2024   1514 EKG 12-lead  Independent interpretation by me: Normal sinus rhythm.  72 beats per minute.  No STEMI.  No ectopy. [LH]   1539 X-Ray Chest AP Portable  Independent interpretation by me: no lung consolidation, effusion, or pneumothorax. Cardiac silhouette appears normal. I have also read the radiologist's report and agree with their findings.    [LH]   1550 RBC(!): 4.43 [LH]   1550 Hemoglobin(!): 13.8 [LH]   1551 Comprehensive metabolic panel(!)  Mild TERESITA. Mild hypokalemia.  [LH]   1605 Troponin I: <0.012 [LH]   1621 On re-evaluation, patient denies chest pain. [LH]   1621 After review of the patients physical exam, ED testing, and history/symptoms, the patient requires additional care in the hospital overnight. These findings were discussed with Dr. Julien with [facility] who will accept the patient. The diagnosis, treatment and plan were discussed with the patient. All questions and concerns have been addressed.      [LH]   1655 Offered patient option of possible admission at the facility besides [facility] due to bed situation.  Patient states she would prefer to go to [facility] because that is where his cardiologist and nephrologist are. [LH]      ED Course User Index  [LH] Joanne Almanza PA-C                             Clinical Impression:  Final diagnoses:  [R07.9] Chest pain with high risk for cardiac etiology (Primary)  [N17.9] TERESITA (acute kidney injury)  [R07.81] Rib pain          ED Disposition Condition    Observation Stable                Joanne Almanza PA-C  08/08/24 1659

## 2024-08-08 NOTE — PHARMACY MED REC
"  [facility]           Pharmacy  Admission Medication History     The home medication history was taken by Komal Trivedi.      Medication history obtained from Medications listed below were obtained from: Patient/family    Based on information gathered for medication list, you may go to "Admission" then "Reconcile Home Medications" tabs to review and/or act upon those items.     The home medication list has been updated by the Pharmacy department.   Please read ALL comments highlighted in yellow.   Please address this information as you see fit.    Feel free to contact us if you have any questions or require assistance.    The medications listed below were removed from the home medication list.  Please reorder if appropriate:    Patient reports NOT TAKING the following medication(s):  Protonix 40mg      No current facility-administered medications on file prior to encounter.     Current Outpatient Medications on File Prior to Encounter   Medication Sig Dispense Refill    amLODIPine (NORVASC) 10 MG tablet Take 10 mg by mouth once daily.      aspirin (ECOTRIN) 81 MG EC tablet Take 1 tablet (81 mg total) by mouth once daily. 30 tablet 12    atorvastatin (LIPITOR) 80 MG tablet Take 1 tablet (80 mg total) by mouth once daily. 90 tablet 3    carvediloL (COREG) 25 MG tablet Take 1 tablet (25 mg total) by mouth 2 (two) times daily. 180 tablet 3    hydroCHLOROthiazide (HYDRODIURIL) 25 MG tablet Take 1 tablet (25 mg total) by mouth once daily. 30 tablet 11    losartan (COZAAR) 100 MG tablet Take 1 tablet (100 mg total) by mouth once daily. 90 tablet 3    multivit-min/iron/folic acid/K (ADULTS MULTIVITAMIN ORAL) Take 1 tablet by mouth once daily.      ticagrelor (BRILINTA) 90 mg tablet Take 1 tablet (90 mg total) by mouth 2 (two) times daily. 60 tablet 11       Please address this information as you see fit.  Feel free to contact us if you have any questions or require assistance.    Komal" Garland  [phone]                .

## 2024-08-09 VITALS
SYSTOLIC BLOOD PRESSURE: 142 MMHG | HEART RATE: 73 BPM | TEMPERATURE: 98 F | DIASTOLIC BLOOD PRESSURE: 74 MMHG | WEIGHT: 214 LBS | BODY MASS INDEX: 29.96 KG/M2 | RESPIRATION RATE: 18 BRPM | HEIGHT: 71 IN | OXYGEN SATURATION: 99 %

## 2024-08-09 PROBLEM — N17.9 AKI (ACUTE KIDNEY INJURY): Status: ACTIVE | Noted: 2024-08-09

## 2024-08-09 PROBLEM — R07.9 CHEST PAIN WITH HIGH RISK FOR CARDIAC ETIOLOGY: Status: RESOLVED | Noted: 2024-08-09 | Resolved: 2024-08-09

## 2024-08-09 PROBLEM — R07.9 CHEST PAIN WITH HIGH RISK FOR CARDIAC ETIOLOGY: Status: ACTIVE | Noted: 2024-08-09

## 2024-08-09 PROBLEM — N17.9 AKI (ACUTE KIDNEY INJURY): Status: RESOLVED | Noted: 2024-08-09 | Resolved: 2024-08-09

## 2024-08-09 LAB
ALBUMIN SERPL BCP-MCNC: 3.5 G/DL (ref 3.5–5.2)
ALBUMIN/CREAT UR: 18.4 UG/MG (ref 0–30)
ALP SERPL-CCNC: 99 U/L (ref 55–135)
ALT SERPL W/O P-5'-P-CCNC: 30 U/L (ref 10–44)
ANION GAP SERPL CALC-SCNC: 10 MMOL/L (ref 8–16)
APICAL FOUR CHAMBER EJECTION FRACTION: 65 %
APICAL TWO CHAMBER EJECTION FRACTION: 66 %
ASCENDING AORTA: 3.06 CM
AST SERPL-CCNC: 25 U/L (ref 10–40)
AV INDEX (PROSTH): 1.07
AV MEAN GRADIENT: 2 MMHG
AV PEAK GRADIENT: 3 MMHG
AV VALVE AREA BY VELOCITY RATIO: 4.63 CM²
AV VALVE AREA: 4.27 CM²
AV VELOCITY RATIO: 1.16
BASOPHILS # BLD AUTO: 0.01 K/UL (ref 0–0.2)
BASOPHILS NFR BLD: 0.2 % (ref 0–1.9)
BILIRUB SERPL-MCNC: 0.8 MG/DL (ref 0.1–1)
BSA FOR ECHO PROCEDURE: 2.21 M2
BUN SERPL-MCNC: 24 MG/DL (ref 8–23)
CALCIUM SERPL-MCNC: 9.3 MG/DL (ref 8.7–10.5)
CHLORIDE SERPL-SCNC: 106 MMOL/L (ref 95–110)
CHOLEST SERPL-MCNC: 119 MG/DL (ref 120–199)
CHOLEST/HDLC SERPL: 3.3 {RATIO} (ref 2–5)
CO2 SERPL-SCNC: 23 MMOL/L (ref 23–29)
CREAT SERPL-MCNC: 1.3 MG/DL (ref 0.5–1.4)
CREAT UR-MCNC: 87 MG/DL (ref 23–375)
CV ECHO LV RWT: 0.27 CM
CV STRESS BASE HR: 69 BPM
DIASTOLIC BLOOD PRESSURE: 83 MMHG
DIFFERENTIAL METHOD BLD: ABNORMAL
DOP CALC AO PEAK VEL: 0.85 M/S
DOP CALC AO VTI: 17.6 CM
DOP CALC LVOT AREA: 4 CM2
DOP CALC LVOT DIAMETER: 2.25 CM
DOP CALC LVOT PEAK VEL: 0.99 M/S
DOP CALC LVOT STROKE VOLUME: 75.11 CM3
DOP CALC MV VTI: 17.8 CM
DOP CALCLVOT PEAK VEL VTI: 18.9 CM
E WAVE DECELERATION TIME: 204.95 MSEC
E/A RATIO: 1.03
E/E' RATIO: 6.74 M/S
ECHO LV POSTERIOR WALL: 0.71 CM (ref 0.6–1.1)
EOSINOPHIL # BLD AUTO: 0.2 K/UL (ref 0–0.5)
EOSINOPHIL NFR BLD: 3 % (ref 0–8)
ERYTHROCYTE [DISTWIDTH] IN BLOOD BY AUTOMATED COUNT: 12.1 % (ref 11.5–14.5)
EST. GFR  (NO RACE VARIABLE): 59 ML/MIN/1.73 M^2
ESTIMATED AVG GLUCOSE: 117 MG/DL (ref 68–131)
FRACTIONAL SHORTENING: 35 % (ref 28–44)
GLUCOSE SERPL-MCNC: 98 MG/DL (ref 70–110)
HBA1C MFR BLD: 5.7 % (ref 4–5.6)
HCT VFR BLD AUTO: 38.8 % (ref 40–54)
HDLC SERPL-MCNC: 36 MG/DL (ref 40–75)
HDLC SERPL: 30.3 % (ref 20–50)
HGB BLD-MCNC: 13 G/DL (ref 14–18)
IMM GRANULOCYTES # BLD AUTO: 0.01 K/UL (ref 0–0.04)
IMM GRANULOCYTES NFR BLD AUTO: 0.2 % (ref 0–0.5)
INTERVENTRICULAR SEPTUM: 0.94 CM (ref 0.6–1.1)
IVRT: 79.92 MSEC
LA MAJOR: 3.55 CM
LA MINOR: 4.24 CM
LA WIDTH: 3.7 CM
LDLC SERPL CALC-MCNC: 67.6 MG/DL (ref 63–159)
LEFT ATRIUM AREA SYSTOLIC (APICAL 2 CHAMBER): 13.41 CM2
LEFT ATRIUM AREA SYSTOLIC (APICAL 4 CHAMBER): 12.32 CM2
LEFT ATRIUM SIZE: 3.88 CM
LEFT ATRIUM VOLUME INDEX MOD: 14.9 ML/M2
LEFT ATRIUM VOLUME INDEX: 21.7 ML/M2
LEFT ATRIUM VOLUME MOD: 32.25 CM3
LEFT ATRIUM VOLUME: 47.16 CM3
LEFT INTERNAL DIMENSION IN SYSTOLE: 3.37 CM (ref 2.1–4)
LEFT VENTRICLE DIASTOLIC VOLUME INDEX: 60 ML/M2
LEFT VENTRICLE DIASTOLIC VOLUME: 130.19 ML
LEFT VENTRICLE END DIASTOLIC VOLUME APICAL 2 CHAMBER: 85.38 ML
LEFT VENTRICLE END DIASTOLIC VOLUME APICAL 4 CHAMBER: 66.84 ML
LEFT VENTRICLE END SYSTOLIC VOLUME APICAL 2 CHAMBER: 31.83 ML
LEFT VENTRICLE END SYSTOLIC VOLUME APICAL 4 CHAMBER: 28.21 ML
LEFT VENTRICLE MASS INDEX: 70 G/M2
LEFT VENTRICLE SYSTOLIC VOLUME INDEX: 21.5 ML/M2
LEFT VENTRICLE SYSTOLIC VOLUME: 46.59 ML
LEFT VENTRICULAR INTERNAL DIMENSION IN DIASTOLE: 5.21 CM (ref 3.5–6)
LEFT VENTRICULAR MASS: 151.53 G
LV LATERAL E/E' RATIO: 6.4 M/S
LV SEPTAL E/E' RATIO: 7.11 M/S
LVED V (TEICH): 130.19 ML
LVES V (TEICH): 46.59 ML
LVOT MG: 1.91 MMHG
LVOT MV: 0.64 CM/S
LYMPHOCYTES # BLD AUTO: 2.3 K/UL (ref 1–4.8)
LYMPHOCYTES NFR BLD: 40.2 % (ref 18–48)
MAGNESIUM SERPL-MCNC: 1.8 MG/DL (ref 1.6–2.6)
MCH RBC QN AUTO: 30.8 PG (ref 27–31)
MCHC RBC AUTO-ENTMCNC: 33.5 G/DL (ref 32–36)
MCV RBC AUTO: 92 FL (ref 82–98)
MICROALBUMIN UR DL<=1MG/L-MCNC: 16 UG/ML
MONOCYTES # BLD AUTO: 0.7 K/UL (ref 0.3–1)
MONOCYTES NFR BLD: 11.4 % (ref 4–15)
MV A" WAVE DURATION": 105.61 MSEC
MV MEAN GRADIENT: 1 MMHG
MV PEAK A VEL: 0.62 M/S
MV PEAK E VEL: 0.64 M/S
MV PEAK GRADIENT: 3 MMHG
MV STENOSIS PRESSURE HALF TIME: 59.44 MS
MV VALVE AREA BY CONTINUITY EQUATION: 4.22 CM2
MV VALVE AREA P 1/2 METHOD: 3.7 CM2
NEUTROPHILS # BLD AUTO: 2.6 K/UL (ref 1.8–7.7)
NEUTROPHILS NFR BLD: 45 % (ref 38–73)
NONHDLC SERPL-MCNC: 83 MG/DL
NRBC BLD-RTO: 0 /100 WBC
OHS CV CPX 85 PERCENT MAX PREDICTED HEART RATE MALE: 127
OHS CV CPX MAX PREDICTED HEART RATE: 149
OHS CV CPX PATIENT IS FEMALE: 0
OHS CV CPX PATIENT IS MALE: 1
OHS CV CPX PEAK DIASTOLIC BLOOD PRESSURE: 83 MMHG
OHS CV CPX PEAK HEAR RATE: 96 BPM
OHS CV CPX PEAK RATE PRESSURE PRODUCT: NORMAL
OHS CV CPX PEAK SYSTOLIC BLOOD PRESSURE: 159 MMHG
OHS CV CPX PERCENT MAX PREDICTED HEART RATE ACHIEVED: 64
OHS CV CPX RATE PRESSURE PRODUCT PRESENTING: NORMAL
OHS LV EJECTION FRACTION SIMPSONS BIPLANE MOD: 67 %
OHS QRS DURATION: 100 MS
OHS QTC CALCULATION: 418 MS
PHOSPHATE SERPL-MCNC: 2.4 MG/DL (ref 2.7–4.5)
PISA TR MAX VEL: 2.04 M/S
PLATELET # BLD AUTO: 211 K/UL (ref 150–450)
PMV BLD AUTO: 9.4 FL (ref 9.2–12.9)
POCT GLUCOSE: 88 MG/DL (ref 70–110)
POCT GLUCOSE: 89 MG/DL (ref 70–110)
POCT GLUCOSE: 96 MG/DL (ref 70–110)
POCT GLUCOSE: 96 MG/DL (ref 70–110)
POTASSIUM SERPL-SCNC: 3.2 MMOL/L (ref 3.5–5.1)
PROT SERPL-MCNC: 7.2 G/DL (ref 6–8.4)
PULM VEIN S/D RATIO: 1.11
PV PEAK D VEL: 0.44 M/S
PV PEAK GRADIENT: 3 MMHG
PV PEAK S VEL: 0.49 M/S
PV PEAK VELOCITY: 0.86 M/S
RA MAJOR: 3.92 CM
RA PRESSURE ESTIMATED: 3 MMHG
RA WIDTH: 3.09 CM
RBC # BLD AUTO: 4.22 M/UL (ref 4.6–6.2)
RV TB RVSP: 5 MMHG
RV TISSUE DOPPLER FREE WALL SYSTOLIC VELOCITY 1 (APICAL 4 CHAMBER VIEW): 12.61 CM/S
SINUS: 3.49 CM
SODIUM SERPL-SCNC: 139 MMOL/L (ref 136–145)
STJ: 3.57 CM
SYSTOLIC BLOOD PRESSURE: 159 MMHG
TDI LATERAL: 0.1 M/S
TDI SEPTAL: 0.09 M/S
TDI: 0.1 M/S
TR MAX PG: 17 MMHG
TRICUSPID ANNULAR PLANE SYSTOLIC EXCURSION: 2.26 CM
TRIGL SERPL-MCNC: 77 MG/DL (ref 30–150)
TV REST PULMONARY ARTERY PRESSURE: 20 MMHG
WBC # BLD AUTO: 5.7 K/UL (ref 3.9–12.7)
Z-SCORE OF LEFT VENTRICULAR DIMENSION IN END DIASTOLE: -3.21
Z-SCORE OF LEFT VENTRICULAR DIMENSION IN END SYSTOLE: -2.05

## 2024-08-09 PROCEDURE — G0378 HOSPITAL OBSERVATION PER HR: HCPCS

## 2024-08-09 PROCEDURE — 63600175 PHARM REV CODE 636 W HCPCS: Performed by: INTERNAL MEDICINE

## 2024-08-09 PROCEDURE — 82043 UR ALBUMIN QUANTITATIVE: CPT

## 2024-08-09 PROCEDURE — 96361 HYDRATE IV INFUSION ADD-ON: CPT

## 2024-08-09 PROCEDURE — 85025 COMPLETE CBC W/AUTO DIFF WBC: CPT

## 2024-08-09 PROCEDURE — 83036 HEMOGLOBIN GLYCOSYLATED A1C: CPT

## 2024-08-09 PROCEDURE — 80061 LIPID PANEL: CPT

## 2024-08-09 PROCEDURE — 63600175 PHARM REV CODE 636 W HCPCS

## 2024-08-09 PROCEDURE — 96365 THER/PROPH/DIAG IV INF INIT: CPT | Mod: 59

## 2024-08-09 PROCEDURE — G0378 HOSPITAL OBSERVATION PER HR: HCPCS | Mod: ER

## 2024-08-09 PROCEDURE — 80053 COMPREHEN METABOLIC PANEL: CPT

## 2024-08-09 PROCEDURE — 25000003 PHARM REV CODE 250

## 2024-08-09 PROCEDURE — A9502 TC99M TETROFOSMIN: HCPCS | Performed by: INTERNAL MEDICINE

## 2024-08-09 PROCEDURE — 84100 ASSAY OF PHOSPHORUS: CPT

## 2024-08-09 PROCEDURE — 82570 ASSAY OF URINE CREATININE: CPT

## 2024-08-09 PROCEDURE — 83735 ASSAY OF MAGNESIUM: CPT

## 2024-08-09 PROCEDURE — 96372 THER/PROPH/DIAG INJ SC/IM: CPT

## 2024-08-09 PROCEDURE — 96366 THER/PROPH/DIAG IV INF ADDON: CPT

## 2024-08-09 PROCEDURE — 96375 TX/PRO/DX INJ NEW DRUG ADDON: CPT | Mod: 59

## 2024-08-09 PROCEDURE — 96376 TX/PRO/DX INJ SAME DRUG ADON: CPT

## 2024-08-09 RX ORDER — CARVEDILOL 25 MG/1
25 TABLET ORAL 2 TIMES DAILY
Status: DISCONTINUED | OUTPATIENT
Start: 2024-08-09 | End: 2024-08-09 | Stop reason: HOSPADM

## 2024-08-09 RX ORDER — ASPIRIN 81 MG/1
81 TABLET ORAL DAILY
Status: DISCONTINUED | OUTPATIENT
Start: 2024-08-09 | End: 2024-08-09 | Stop reason: HOSPADM

## 2024-08-09 RX ORDER — ATORVASTATIN CALCIUM 40 MG/1
80 TABLET, FILM COATED ORAL DAILY
Status: DISCONTINUED | OUTPATIENT
Start: 2024-08-09 | End: 2024-08-09 | Stop reason: HOSPADM

## 2024-08-09 RX ORDER — REGADENOSON 0.08 MG/ML
0.4 INJECTION, SOLUTION INTRAVENOUS ONCE
Status: COMPLETED | OUTPATIENT
Start: 2024-08-09 | End: 2024-08-09

## 2024-08-09 RX ORDER — HEPARIN SODIUM 5000 [USP'U]/ML
5000 INJECTION, SOLUTION INTRAVENOUS; SUBCUTANEOUS EVERY 8 HOURS
Status: DISCONTINUED | OUTPATIENT
Start: 2024-08-09 | End: 2024-08-09 | Stop reason: HOSPADM

## 2024-08-09 RX ORDER — IBUPROFEN 200 MG
16 TABLET ORAL
Status: DISCONTINUED | OUTPATIENT
Start: 2024-08-09 | End: 2024-08-09 | Stop reason: HOSPADM

## 2024-08-09 RX ORDER — IBUPROFEN 200 MG
24 TABLET ORAL
Status: DISCONTINUED | OUTPATIENT
Start: 2024-08-09 | End: 2024-08-09 | Stop reason: HOSPADM

## 2024-08-09 RX ORDER — POTASSIUM CHLORIDE 7.45 MG/ML
10 INJECTION INTRAVENOUS
Status: COMPLETED | OUTPATIENT
Start: 2024-08-09 | End: 2024-08-09

## 2024-08-09 RX ORDER — LOSARTAN POTASSIUM 50 MG/1
100 TABLET ORAL DAILY
Status: DISCONTINUED | OUTPATIENT
Start: 2024-08-09 | End: 2024-08-09 | Stop reason: HOSPADM

## 2024-08-09 RX ORDER — NALOXONE HCL 0.4 MG/ML
0.02 VIAL (ML) INJECTION
Status: DISCONTINUED | OUTPATIENT
Start: 2024-08-09 | End: 2024-08-09 | Stop reason: HOSPADM

## 2024-08-09 RX ORDER — AMLODIPINE BESYLATE 5 MG/1
10 TABLET ORAL DAILY
Status: DISCONTINUED | OUTPATIENT
Start: 2024-08-09 | End: 2024-08-09 | Stop reason: HOSPADM

## 2024-08-09 RX ORDER — GLUCAGON 1 MG
1 KIT INJECTION
Status: DISCONTINUED | OUTPATIENT
Start: 2024-08-09 | End: 2024-08-09 | Stop reason: HOSPADM

## 2024-08-09 RX ORDER — MAGNESIUM SULFATE HEPTAHYDRATE 40 MG/ML
2 INJECTION, SOLUTION INTRAVENOUS ONCE
Status: COMPLETED | OUTPATIENT
Start: 2024-08-09 | End: 2024-08-09

## 2024-08-09 RX ORDER — HYDROCHLOROTHIAZIDE 25 MG/1
25 TABLET ORAL DAILY
Status: DISCONTINUED | OUTPATIENT
Start: 2024-08-09 | End: 2024-08-09 | Stop reason: HOSPADM

## 2024-08-09 RX ORDER — INSULIN ASPART 100 [IU]/ML
0-5 INJECTION, SOLUTION INTRAVENOUS; SUBCUTANEOUS
Status: DISCONTINUED | OUTPATIENT
Start: 2024-08-09 | End: 2024-08-09 | Stop reason: HOSPADM

## 2024-08-09 RX ORDER — SODIUM CHLORIDE 0.9 % (FLUSH) 0.9 %
10 SYRINGE (ML) INJECTION EVERY 12 HOURS PRN
Status: DISCONTINUED | OUTPATIENT
Start: 2024-08-09 | End: 2024-08-09 | Stop reason: HOSPADM

## 2024-08-09 RX ADMIN — MAGNESIUM SULFATE HEPTAHYDRATE 2 G: 40 INJECTION, SOLUTION INTRAVENOUS at 10:08

## 2024-08-09 RX ADMIN — AMLODIPINE BESYLATE 10 MG: 5 TABLET ORAL at 10:08

## 2024-08-09 RX ADMIN — TICAGRELOR 90 MG: 90 TABLET ORAL at 10:08

## 2024-08-09 RX ADMIN — POTASSIUM CHLORIDE 10 MEQ: 7.46 INJECTION, SOLUTION INTRAVENOUS at 05:08

## 2024-08-09 RX ADMIN — HYDROCHLOROTHIAZIDE 25 MG: 25 TABLET ORAL at 10:08

## 2024-08-09 RX ADMIN — TETROFOSMIN 10 MILLICURIE: 1.38 INJECTION, POWDER, LYOPHILIZED, FOR SOLUTION INTRAVENOUS at 12:08

## 2024-08-09 RX ADMIN — ASPIRIN 81 MG: 81 TABLET, COATED ORAL at 10:08

## 2024-08-09 RX ADMIN — POTASSIUM CHLORIDE 10 MEQ: 7.46 INJECTION, SOLUTION INTRAVENOUS at 12:08

## 2024-08-09 RX ADMIN — LOSARTAN POTASSIUM 100 MG: 50 TABLET, FILM COATED ORAL at 10:08

## 2024-08-09 RX ADMIN — POTASSIUM CHLORIDE 10 MEQ: 7.46 INJECTION, SOLUTION INTRAVENOUS at 11:08

## 2024-08-09 RX ADMIN — ATORVASTATIN CALCIUM 80 MG: 40 TABLET, FILM COATED ORAL at 10:08

## 2024-08-09 RX ADMIN — SODIUM CHLORIDE, POTASSIUM CHLORIDE, SODIUM LACTATE AND CALCIUM CHLORIDE 1000 ML: 600; 310; 30; 20 INJECTION, SOLUTION INTRAVENOUS at 02:08

## 2024-08-09 RX ADMIN — CARVEDILOL 25 MG: 6.25 TABLET, FILM COATED ORAL at 10:08

## 2024-08-09 RX ADMIN — POTASSIUM CHLORIDE 10 MEQ: 7.46 INJECTION, SOLUTION INTRAVENOUS at 04:08

## 2024-08-09 RX ADMIN — REGADENOSON 0.4 MG: 0.08 INJECTION, SOLUTION INTRAVENOUS at 02:08

## 2024-08-09 RX ADMIN — POTASSIUM CHLORIDE 10 MEQ: 7.46 INJECTION, SOLUTION INTRAVENOUS at 10:08

## 2024-08-09 RX ADMIN — HEPARIN SODIUM 5000 UNITS: 5000 INJECTION INTRAVENOUS; SUBCUTANEOUS at 06:08

## 2024-08-09 RX ADMIN — TETROFOSMIN 30 MILLICURIE: 1.38 INJECTION, POWDER, LYOPHILIZED, FOR SOLUTION INTRAVENOUS at 02:08

## 2024-08-09 RX ADMIN — THERA TABS 1 TABLET: TAB at 10:08

## 2024-08-09 NOTE — PROGRESS NOTES
[facility]                    Pharmacy       Discharge Medication Education    Patient ACCEPTED medication education. Pharmacy has provided education on the name, indication, and possible side effects of the medication(s) prescribed, using teach-back method.     The following medications have also been discussed, during this admission.        Medication List        CONTINUE taking these medications      ADULTS MULTIVITAMIN ORAL     amLODIPine 10 MG tablet  Commonly known as: NORVASC     aspirin 81 MG EC tablet  Commonly known as: ECOTRIN  Take 1 tablet (81 mg total) by mouth once daily.     atorvastatin 80 MG tablet  Commonly known as: LIPITOR  Take 1 tablet (80 mg total) by mouth once daily.     carvediloL 25 MG tablet  Commonly known as: COREG  Take 1 tablet (25 mg total) by mouth 2 (two) times daily.     celecoxib 200 MG capsule  Commonly known as: CeleBREX     hydroCHLOROthiazide 25 MG tablet  Commonly known as: HYDRODIURIL  Take 1 tablet (25 mg total) by mouth once daily.     losartan 100 MG tablet  Commonly known as: COZAAR  Take 1 tablet (100 mg total) by mouth once daily.     methocarbamoL 500 MG Tab  Commonly known as: ROBAXIN     ticagrelor 90 mg tablet  Commonly known as: BRILINTA  Take 1 tablet (90 mg total) by mouth 2 (two) times daily.     traMADoL 50 mg tablet  Commonly known as: ULTRAM               Thank you  Willie Chapman, PharmD  [phone]

## 2024-08-09 NOTE — PROGRESS NOTES
"[facility] Hospital Medicine Progress Note    Primary Team: [facility] Hospitalist Team B  Attending Physician: Jarek Winter MD  Resident: Tremayne El  Intern: Radha Balderas    Subjective/Interval History      Patient resting in bed, not complaining of any pain. Denied any fevers, chills, nausea, vomiting, chest pain, SOB, abdominal pain.     Objective   Last 24 Hour Vital Signs:  BP  Min: 145/86  Max: 183/87  Temp  Av.8 °F (36.6 °C)  Min: 97.5 °F (36.4 °C)  Max: 98 °F (36.7 °C)  Pulse  Av.7  Min: 65  Max: 75  Resp  Av.8  Min: 15  Max: 20  SpO2  Av.3 %  Min: 96 %  Max: 99 %  Height  Av' 11" (180.3 cm)  Min: 5' 11" (180.3 cm)  Max: 5' 11" (180.3 cm)  Weight  Av.6 kg (210 lb 11.5 oz)  Min: 95 kg (209 lb 7 oz)  Max: 96.2 kg (212 lb)  I/O last 3 completed shifts:  In: 1548.2 [IV Piggyback:1548.2]  Out: -     Physical Examination:  GEN- NAD, laying comfortably in bed  HEENT- PERRL, EOMI, MMM  NECK- No JVD  CARDIAC- RRR, no murmurs or rubs. 2+ equal radial and DP pulses  RESP- CTAB, normal work of breathing, no wheezes, crackles, or rhonchi  ABD- Soft, NT, ND  EXT- No clubbing, cyanosis, or edema  NEURO - Moves all four extremities spontaneously; light touch sensation intact and symmetric  SKIN -Warm and dry; no rashes     Laboratory and Microbiology:  I have independently reviewed data.  Hb A1c 5.7%     Imaging and EKGs:  I have independently reviewed data.  : NSR with rate 72. Normal axis and intervals. Poor R wave progression. No ST or T wave changes concerning for acute ischemia. TWI to lead III.     Assessment & Plan   Luis Mendez is a 73 year old male with PMH of HTN, pre-diabetes, prostate cancer, CAD s/p stents (VENU x 2 to RCA 2/2 inferior STEMI 2023) who presented to [facility] for left sided chest pain and arm pain x 1 day. Started after walking to his truck. Pain lasted a few minutes and completely subsided by the time he arrived to ED. Does not feel similar to " prior STEMI. No associated symptoms. EKG non-ischemic and at baseline. Trop negative x 2. Admitted to [facility] for high risk chest pain management and workup. Waiting for NM myocardial perfusion spect.    Chest Pain  Hx Inferior STEMI with VENU x 2  Above history. Trop negative x 2. Describes pain as pulled muscle sensation that was initially worsened with moving left arm, but resolved after few minutes. EKG non-ischemic and at baseline. Hx of inferior STEMI Sept 2023 with VENU x 2 to proximal and distal RCA. EKG with ST elevations was not documented or uploaded to [employer]. Echo (Sept 2023) shows EF 60-65%. HEART score 5. History does not sound consistent with chest pain of cardiac origin, however given age and significant co-morbidities including prior STEMI patient is high risk. Given age and prior smoking history, AAA considered, but had normal aorta on abdominal ultrasound in 2020. History not consistent with PE, sammy/pericarditis, dissection, esophageal pathology. Appears to be most likely MSK related pain.  - Will discuss with cardiology about outpatient stress testing vs monitoring  - A1c of 5.7  - Trop negative x2  - PRN stat EKG and trop ordered if new chest pain develops  - Continue with telemetry  - Continue home ASA and Brilinta   - Follow up echo + stress test      TERESITA on CKD2/3a vs worsening CKD, resolving  Hypokalemia  Baseline Cr 1.3. Elevated to 1.61 this admission. Received 500 cc NS bolus in ED. 20 mEq K in ED. Cr 1.3 on 8/9  -Renally dose medications  -Microalbumin/Cr ratio still in process     HTN  Home regimen: Carvedilol 25 mg BID, HCTZ 25 mg daily, Losartan 100 mg daily, Amlodipine 10 mg daily  -Continue home regimen     HLD  -Continue home lipitor 80  -Total cholesterol 119, HDL 36, triglycerides 77, LDL 67.6     Code Status: Full  DVT Prophylaxis: Heparin  Diet: NPO  Disposition: Pending nuclear stress    Case will be discussed with attending physician and attestation to follow,  please appreciate.     Tremayne El,   [facility] PGY-2  [facility] IM Team B    [facility] Medicine Hospitalist Pager numbers:   [facility] Hospitalist Medicine Team A (Phan/Michelle): [phone]  [facility] Hospitalist Medicine Team B (Patrice/Moy):  [phone]

## 2024-08-09 NOTE — PLAN OF CARE
Introduced as VN and will be reviewing discharge instructions.  Educated patient on reason for admission, home medication list, and discharge instructions including when to return to ED and the following doctor appointments.  Education per flowsheet.  Opportunity given for questions and questions answered.  Nurse  notified of   completion of discharge education.  Patient waiting for wheelchair

## 2024-08-09 NOTE — ED NOTES
Updated pt on POC. Informed pt that we are still waiting on bed at [facility].    Per MD. Pt's wife is allowed to give pt home meds.    Pt given meal tray and water pitcher.     Pt AAOx4. Resp even and unlabored. No acute distress noted. Bed locked, in lowest position, bed rails up x2. Call light within reach and educated on use.

## 2024-08-09 NOTE — PLAN OF CARE
SW met with pt at bedside this AM at bedside to complete DCA. Pt reported that he lives at home with his wife Nesha [phone] and will have her help transport him home at time of d/c. Pt stated that he does not use any HME at home and that he is fully independent in his ADL's. Pt is not current with any HH or HD and still drives to all his appts. Pt did not have any additional questions or concerns for sw at this time. White board updated with CM name and contact information.  Discharge brochure provided.  Pt encouraged to call with any questions or concerns.  Cm will continue to follow pt through transitions of care and assist with any discharge needs.    DEYANIRA Olguin  [phone]    Future Appointments   Date Time Provider Department Center   8/9/2024 11:40 AM [facility] NM1 [facility]   8/28/2024  3:45 PM Leslye Chavez MD [facility]        08/09/24 1128   Discharge Planning   Assessment Type Discharge Planning Brief Assessment   Support Systems Spouse/significant other   Equipment Currently Used at Home none   Current Living Arrangements home   Patient/Family Anticipates Transition to home   Patient/Family Anticipated Services at Transition none   DME Needed Upon Discharge  none   Discharge Plan A Home with family

## 2024-08-09 NOTE — NURSING
VIRTUAL NURSE: Pt arrived to unit. Permission received to turn camera to view patient. VIP model explained; Patient informed this VN will be working with bedside nurse and the rest of the care team.      Admission questions completed.  Plan of care reviewed with patient.  Educated patient on VTE and fall risk. Safety precautions in place. Call light within reach, side rails up x2.     Patient instucted to ask staff for assistance. Patient verbalized complete understanding.  Will continue to be available and intervene as needed.    Labs, notes, orders, and careplan reviewed.      08/09/24 0137   Admission   Initial VN Admission Questions Complete   Shift   Virtual Nurse - Rounding Complete   Virtual Nurse - Patient Verbalized Approval Of Camera Use;VN Rounding   Type of Frequent Check   Type Patient Rounds   Safety/Activity   Patient Rounds bed in low position;bed wheels locked;call light in patient/parent reach;clutter free environment maintained;visualized patient;placement of personal items at bedside   Safety Promotion/Fall Prevention assistive device/personal item within reach;side rails raised x 2   Activity Assistance Provided assistance, 1 person   Positioning   Body Position position changed independently   Head of Bed (HOB) Positioning HOB lowered   Pain/Comfort/Sleep   Preferred Pain Scale number (Numeric Rating Pain Scale)

## 2024-08-09 NOTE — ED NOTES
Pt left with victor hugo for admission to [facility].    AAO4. Resp even and unlabored. VSS. No acute distress noted. Paperwork given to victor hugo.

## 2024-08-09 NOTE — H&P
[facility] Medicine H&P Note     Admitting Team: [facility] Hospitalist Team B  Attending Physician: Jarek Winter MD  Resident: Sienna  Intern: Sherrie    Date of Admit: 8/8/2024    Chief Complaint     Left arm and chest pain x 1 day    Subjective:      History of Present Illness:    Luis Mendez is a 73 year old male with PMH of HTN, pre-diabetes, prostate cancer, CAD s/p stents (VENU x 2 to RCA 2/2 inferior STEMI 09/2023) who presented to [facility] for left sided chest pain and arm pain x 1 day.    Patient reports he was at work when he finished up and walked approximately 10 yards to his truck. When he got to his truck he describes a muscle pulled sensation to his left chest and left tricep area which he rated a 3-4/10. Pain lasted a few minutes and was completely subsided by the time his wife drove him to [facility] ED. Pain did not radiate anywhere. Initially moving his left arm made the pain to his tricep worsen. No associated shortness of breath, diaphoresis, lightheadedness, dizziness, syncope, abdominal pain, nausea, vomiting, dysuria. No recent illness. No injuries to his chest or arm.    Reports this pain does not feel like the mid substernal chest pressure he had when he was diagnosed with an inferior STEMI in 09/2023. At that time he had VENU x 2 placed to proximal and distal RCA. Has been on DAPT (ASA and Brillinta) since then and reports daily adherence to both medications without missing any recent doses.     He does report some substernal chest pressure that has been present over the past 4 weeks that he is attributing to gas pain. States he spoke with his cardiologist, Dr. Chavez, who recommended he try using GasX. He has been using GasX intermittently with pain relief. Does report belching over this time period. This pain is usually associated at night when he is resting. This pain also does not feel similar to his prior STEMI.    Past Medical History:  Past Medical History:    Diagnosis Date    Hypertension     Prostate cancer    HTN  CAD s/p stents (VENU x 2 to RCA 2/2 inferior STEMI 09/2023)  Pre-diabetes  GERD  Prostate Cancer    Past Surgical History:  Past Surgical History:   Procedure Laterality Date    APPENDECTOMY      CLOSURE DEVICE  9/14/2023    Procedure: Placement of Closure Device;  Surgeon: Moustapha Mitchell III, MD;  Location: [facility] CATH LAB/EP;  Service: Cardiology;;    COLONOSCOPY N/A 5/15/2018    Procedure: COLONOSCOPY;  Surgeon: Edison Amado Jr., MD;  Location: [facility] ENDO;  Service: Endoscopy;  Laterality: N/A;    IVUS, CORONARY  9/14/2023    Procedure: IVUS, Coronary;  Surgeon: Moustapha Mitchell III, MD;  Location: [facility] CATH LAB/EP;  Service: Cardiology;;    LEFT HEART CATHETERIZATION N/A 9/14/2023    Procedure: Left heart cath;  Surgeon: Moustapha Mitchell III, MD;  Location: [facility] CATH LAB/EP;  Service: Cardiology;  Laterality: N/A;    PROSTATE SURGERY      PTCA, SINGLE VESSEL  9/14/2023    Procedure: PTCA, Single Vessel;  Surgeon: Moustapha Mitchell III, MD;  Location: [facility] CATH LAB/EP;  Service: Cardiology;;    STENT, DRUG ELUTING, MULTI VESSEL, CORONARY  9/14/2023    Procedure: Stent, Drug Eluting, Multi Vessel, Coronary;  Surgeon: Moustapha Mitchell III, MD;  Location: [facility] CATH LAB/EP;  Service: Cardiology;;       Allergies:  Review of patient's allergies indicates:  No Known Allergies    Home Medications:    ASA 81 mg daily  Brilinta 90 mg BID  Carvedilol 25 mg BID  HCTZ 25 mg daily  Losartan 100 mg daily  Amlodipine 10 mg daily  Atorvastatin 80 mg daily  Multivitamin daily    Family History:  Family History   Problem Relation Name Age of Onset    No Known Problems Mother      No Known Problems Father      No Known Problems Sister         Social History:  Social History     Tobacco Use    Smoking status: Former    Smokeless tobacco: Never   Substance Use Topics    Alcohol use: No    Drug use: No       Review of Systems:  Pertinent items are noted in  HPI. All other systems are reviewed and are negative.    Health Maintaince :   Primary Care Physician: Karen Aragon MD    Cancer Screening:  Colonoscopy: 5/15/2018 - 1 polyp removed. Grade I internal hemorrhoid. Repeat in 4 years     Objective:   Last 24 Hour Vital Signs:  BP  Min: 153/79  Max: 183/87  Temp  Av.9 °F (36.6 °C)  Min: 97.9 °F (36.6 °C)  Max: 97.9 °F (36.6 °C)  Pulse  Av.8  Min: 70  Max: 75  Resp  Av.6  Min: 15  Max: 20  SpO2  Av.4 %  Min: 98 %  Max: 99 %  Weight  Av.2 kg (212 lb)  Min: 96.2 kg (212 lb)  Max: 96.2 kg (212 lb)  Body mass index is 30.42 kg/m².  I/O last 3 completed shifts:  In: 500 [IV Piggyback:500]  Out: -     Physical Examination:   GEN- NAD, laying comfortably in bed  HEENT- PERRL, EOMI, MMM  NECK- No JVD  CARDIAC- RRR, no murmurs or rubs. 2+ equal radial and DP pulses  RESP- CTAB, normal work of breathing, no wheezes, crackles, or rhonchi  ABD- Soft, NT, ND  EXT- No clubbing, cyanosis, or edema  NEURO - Moves all four extremities spontaneously; light touch sensation intact and symmetric  SKIN -Warm and dry; no rashes    Laboratory:  Most Recent Data:  CBC:   Lab Results   Component Value Date    WBC 4.73 2024    HGB 13.8 (L) 2024    HCT 41.4 2024     2024    MCV 94 2024    RDW 12.1 2024     BMP:   Lab Results   Component Value Date     2024    K 3.4 (L) 2024     2024    CO2 25 2024    BUN 30 (H) 2024    CREATININE 1.61 (H) 2024     (H) 2024    CALCIUM 9.4 2024    MG 1.9 09/15/2023    PHOS 2.9 09/15/2023     LFTs:   Lab Results   Component Value Date    PROT 8.2 2024    ALBUMIN 4.4 2024    BILITOT 0.6 2024    AST 40 2024    ALKPHOS 117 2024    ALT 39 2024     Coags:   Lab Results   Component Value Date    INR 1.0 2023     FLP:   Lab Results   Component Value Date    CHOL 136 2023    HDL 40 2023     "LDLCALC 88.8 09/14/2023    TRIG 36 09/14/2023    CHOLHDL 29.4 09/14/2023     DM:   Lab Results   Component Value Date    HGBA1C 5.7 (H) 09/14/2023    LDLCALC 88.8 09/14/2023    CREATININE 1.61 (H) 08/08/2024     Thyroid:   Lab Results   Component Value Date    TSH 1.190 09/14/2023     Anemia: No results found for: "IRON", "TIBC", "FERRITIN", "OXMGTJVO12", "FOLATE"  Cardiac:   Lab Results   Component Value Date    TROPONINI <0.012 08/08/2024    BNP <10 08/12/2018     Urinalysis:   Lab Results   Component Value Date    COLORU Alice 12/17/2021    SPECGRAV 1.015 12/17/2021    NITRITE Negative 12/17/2021    KETONESU Negative 12/17/2021    UROBILINOGEN Negative 12/17/2021    WBCUA 3 12/17/2021       Trended Lab Data:  Recent Labs   Lab 08/08/24  1531   WBC 4.73   HGB 13.8*   HCT 41.4      MCV 94   RDW 12.1      K 3.4*      CO2 25   BUN 30*   CREATININE 1.61*   *   PROT 8.2   ALBUMIN 4.4   BILITOT 0.6   AST 40   ALKPHOS 117   ALT 39       Trended Cardiac Data:  Recent Labs   Lab 08/08/24  1531 08/08/24  1838   TROPONINI <0.012 <0.012       Microbiology Data:  None    Other Results:  EKG (my interpretation):   8/8: NSR with rate 72. Normal axis and intervals. Poor R wave progression. No ST or T wave changes concerning for acute ischemia. TWI to lead III.    Radiology:  X-Ray Chest AP Portable   Final Result      No acute process seen.         Electronically signed by: Moncho Caldwell MD   Date:    08/08/2024   Time:    15:34           Assessment:     Luis Mendez is a 73 year old male with PMH of HTN, pre-diabetes, prostate cancer, CAD s/p stents (VENU x 2 to RCA 2/2 inferior STEMI 09/2023) who presented to [facility] for left sided chest pain and arm pain x 1 day. Started after walking to his truck. Pain lasted a few minutes and completely subsided by the time he arrived to ED. Does not feel similar to prior STEMI. No associated symptoms. EKG non-ischemic and at baseline. Trop " negative x 2. [facility] for high risk chest pain management and workup.     Plan:     Chest Pain  Hx Inferior STEMI with VENU x 2  Above history. Trop negative x 2. Describes pain as pulled muscle sensation that was initially worsened with moving left arm, but resolved after few minutes. EKG non-ischemic and at baseline. Hx of inferior STEMI Sept 2023 with VENU x 2 to proximal and distal RCA. EKG with ST elevations was not documented or uploaded to [facility]. Echo (Sept 2023) shows EF 60-65%. HEART score 5. History does not sound consistent with chest pain of cardiac origin, however given age and significant co-morbidities including prior STEMI patient is high risk. Given age and prior smoking history, AAA considered, but had normal aorta on abdominal ultrasound in 2020. History not consistent with PE, sammy/pericarditis, dissection, esophageal pathology. Appears to be most likely MSK related pain.  -Follow up echo   -Will discuss with cardiology about outpatient stress testing vs monitoring  -Continue with telemetry  -STAT EKG and trop for new chest pain  -Continue home ASA and Brilinta   -Follow up lipid panel and A1c    TERESITA on CKD2/3a vs worsening CKD  Hypokalemia  Baseline Cr 1.3. Elevated to 1.61 this admission. Received 500 cc NS bolus in ED. 20 mEq K in ED.  -Will give 1 LR bolus and reassess renal function with morning labs  -Renally dose medications  -Follow up microalbumin/Cr ratio    HTN  Home regimen: Carvedilol 25 mg BID, HCTZ 25 mg daily, Losartan 100 mg daily, Amlodipine 10 mg daily  -Continue home regimen    HLD  -Continue home lipitor 80  -Follow up lipid panel    Diet: NPO  PPX: Heparin    Dispo:    Ildefonso Hernández MD  [facility] Internal Medicine/Emergency Medicine -III    [facility] Medicine Hospitalist Pager numbers:   [facility] Hospitalist Medicine Team A (Phan/Michelle): [phone]  [facility] Hospitalist Medicine Team B (Patrice/Moy):  [phone]

## 2024-08-10 NOTE — MEDICAL/APP STUDENT
[facility] Medicine Discharge Summary    Primary Team: hospitals Hospitalist Team B  Attending Physician: Jarek Winter MD  Resident: Tremayne El  Intern: Radha Balderas    Date of Admit: 8/8/2024  Date of Discharge: 8/9/2024    Discharge to: Home/Self-Care  Condition: Stable    Discharge Diagnoses     Patient Active Problem List   Diagnosis    Screening for colorectal cancer    Essential hypertension    Mixed hyperlipidemia    Family history of diabetes mellitus (DM)    Shortness of breath    Prostate cancer    Class 1 obesity due to excess calories with serious comorbidity and body mass index (BMI) of 32.0 to 32.9 in adult    GERD (gastroesophageal reflux disease)    Hypokalemia    Coronary artery disease involving native coronary artery of native heart without angina pectoris       Brief History of Present Illness      Patient reports he was at work when he finished up and walked approximately 10 yards to his truck. When he got to his truck he describes a muscle pulled sensation to his left chest and left tricep area which he rated a 3-4/10. Pain lasted a few minutes and was completely subsided by the time his wife drove him to [facility] ED. Pain did not radiate anywhere. Initially moving his left arm made the pain to his tricep worsen. No associated shortness of breath, diaphoresis, lightheadedness, dizziness, syncope, abdominal pain, nausea, vomiting, dysuria. No recent illness. No injuries to his chest or arm.     Reports this pain does not feel like the mid substernal chest pressure he had when he was diagnosed with an inferior STEMI in 09/2023. At that time he had VENU x 2 placed to proximal and distal RCA. Has been on DAPT (ASA and Brillinta) since then and reports daily adherence to both medications without missing any recent doses.      He does report some substernal chest pressure that has been present over the past 4 weeks that he is attributing to gas pain. States he spoke with his cardiologist,   Scott, who recommended he try using GasX. He has been using GasX intermittently with pain relief. Does report belching over this time period. This pain is usually associated at night when he is resting. This pain also does not feel similar to his prior STEMI.    For the full HPI please refer to the History & Physical from this admission.    Hospital Course By Problem with Pertinent Findings     Luis Mendez is a 73 year old male with PMH of HTN, pre-diabetes, prostate cancer, CAD s/p stents (VENU x 2 to RCA 2/2 inferior STEMI 09/2023) who presented to [facility] for left sided chest pain and arm pain x 1 day. Started after walking to his truck. Pain lasted a few minutes and completely subsided by the time he arrived to ED. Does not feel similar to prior STEMI. No associated symptoms. EKG non-ischemic and at baseline. Trop negative x 2. Admitted to [facility] for high risk chest pain management and workup.      Chest Pain  Hx Inferior STEMI with VENU x 2  Trop negative x 2. Describes pain as pulled muscle sensation that was initially worsened with moving left arm, but resolved after few minutes. EKG non-ischemic and at baseline. Hx of inferior STEMI Sept 2023 with VENU x 2 to proximal and distal RCA. EKG with ST elevations was not documented or uploaded to [email or web address]. HEART score 5. History does not sound consistent with chest pain of cardiac origin, however given age and significant co-morbidities including prior STEMI patient is high risk. Given age and prior smoking history, AAA considered, but had normal aorta on abdominal ultrasound in 2020. Appears to be most likely MSK related pain.  - A1c of 5.7  - Continue home ASA and Brilinta   - Echo with LVEF 67%  - Stress test with LVEF 63% and no evidence of ischemia or infarct, normal wall motion     TERESITA on CKD2/3a vs worsening CKD, resolving  Hypokalemia  Baseline Cr 1.3. Elevated to 1.61 this admission. Cr 1.3 on discharge.  Microalbumin/Cr ratio 18.4     HTN  Home regimen: Carvedilol 25 mg BID, HCTZ 25 mg daily, Losartan 100 mg daily, Amlodipine 10 mg daily     HLD  Home lipitor 80  -Total cholesterol 119, HDL 36, triglycerides 77, LDL 67.6    Consultants and Procedures     Consultants:  None    Procedures:   None    Vitals on discharge:   Vitals:    08/09/24 1656   BP: (!) 142/74   Pulse: 73   Resp: 18   Temp: 98 °F (36.7 °C)        Discharge Medications        Medication List        CONTINUE taking these medications      ADULTS MULTIVITAMIN ORAL     amLODIPine 10 MG tablet  Commonly known as: NORVASC     aspirin 81 MG EC tablet  Commonly known as: ECOTRIN  Take 1 tablet (81 mg total) by mouth once daily.     atorvastatin 80 MG tablet  Commonly known as: LIPITOR  Take 1 tablet (80 mg total) by mouth once daily.     carvediloL 25 MG tablet  Commonly known as: COREG  Take 1 tablet (25 mg total) by mouth 2 (two) times daily.     celecoxib 200 MG capsule  Commonly known as: CeleBREX     hydroCHLOROthiazide 25 MG tablet  Commonly known as: HYDRODIURIL  Take 1 tablet (25 mg total) by mouth once daily.     losartan 100 MG tablet  Commonly known as: COZAAR  Take 1 tablet (100 mg total) by mouth once daily.     methocarbamoL 500 MG Tab  Commonly known as: ROBAXIN     ticagrelor 90 mg tablet  Commonly known as: BRILINTA  Take 1 tablet (90 mg total) by mouth 2 (two) times daily.     traMADoL 50 mg tablet  Commonly known as: ULTRAM               Discharge Information:   Diet:  Cardiac    Physical Activity:  Encouraged             Instructions:  1. Take all medications as prescribed  2. Keep all follow-up appointments  3. Return to the hospital or call your primary care physicians if any worsening symptoms such as fever, chest pain, shortness of breath, return of symptoms, or any other concerns.    Follow-Up Appointments:  None

## 2024-08-12 NOTE — DISCHARGE SUMMARY
[facility] Hospital Medicine Discharge Summary    Primary Team: [facility] Hospitalist Team B  Attending Physician: Jarek Winter MD  Resident: Tremayne El  Intern: Radha Balderas    Date of Admit: 8/8/2024  Date of Discharge: 8/9/2024    Discharge to: Home/Self-Care  Condition: Stable    Discharge Diagnoses     Patient Active Problem List   Diagnosis    Screening for colorectal cancer    Essential hypertension    Mixed hyperlipidemia    Family history of diabetes mellitus (DM)    Shortness of breath    Prostate cancer    Class 1 obesity due to excess calories with serious comorbidity and body mass index (BMI) of 32.0 to 32.9 in adult    GERD (gastroesophageal reflux disease)    Hypokalemia    Coronary artery disease involving native coronary artery of native heart without angina pectoris       Brief History of Present Illness      Luis Mendez is a 73 year old male with PMH of HTN, pre-diabetes, prostate cancer, CAD s/p stents (VENU x 2 to RCA 2/2 inferior STEMI 09/2023) who presented to [facility] for left sided chest pain and arm pain x 1 day which started after walking to his truck. Pain lasted a few minutes and completely subsided by the time he arrived to ED. Does not feel similar to prior STEMI. No associated symptoms. EKG non-ischemic and at baseline. Trop negative x 2. [facility]mitted [facility] for high risk chest pain management and workup, which included an echo and a nuclear stress test, both negative for ischemia.    For the full HPI please refer to the History & Physical from this admission.    Hospital Course By Problem with Pertinent Findings     Non-ACS chest pain  Hx Inferior STEMI with VENU x 2  Trop negative x 2. Describes pain as pulled muscle sensation that was initially worsened with moving left arm, but resolved after few minutes. EKG non-ischemic and at baseline. Hx of inferior STEMI Sept 2023 with VENU x 2 to proximal and distal RCA. EKG with ST elevations was not  documented or uploaded to [employer]. HEART score 5. History does not sound consistent with chest pain of cardiac origin, however given age and significant co-morbidities including prior STEMI patient is high risk. Given age and prior smoking history, AAA considered, but had normal aorta on abdominal ultrasound in 2020. Appears to be most likely MSK related pain.  - A1c of 5.7  - Continue home ASA and Brilinta   - Echo with LVEF 67% with normal diastolic function. Mild MR and mild TR.  - Stress test with LVEF 63% and no evidence of ischemia or infarct, normal wall motion  - Has cardiology follow up on discharge     TERESITA on CKD2/3a vs worsening CKD, resolving  Hypokalemia  Baseline Cr 1.3. Elevated to 1.61 this admission.   - responded to fluids, back to baseline at discharge     HTN  Continue home regimen: Carvedilol 25 mg BID, HCTZ 25 mg daily, Losartan 100 mg daily, Amlodipine 10 mg daily     HLD  - total cholesterol 119, HDL 36, triglycerides 77, LDL 67.6  - continue lipitor 80    Consultants and Procedures     Consultants:  None    Procedures:   None    Vitals on discharge:   Vitals:    08/09/24 1656   BP: (!) 142/74   Pulse: 73   Resp: 18   Temp: 98 °F (36.7 °C)        Discharge Medications        Medication List        CONTINUE taking these medications      ADULTS MULTIVITAMIN ORAL     amLODIPine 10 MG tablet  Commonly known as: NORVASC     aspirin 81 MG EC tablet  Commonly known as: ECOTRIN  Take 1 tablet (81 mg total) by mouth once daily.     atorvastatin 80 MG tablet  Commonly known as: LIPITOR  Take 1 tablet (80 mg total) by mouth once daily.     carvediloL 25 MG tablet  Commonly known as: COREG  Take 1 tablet (25 mg total) by mouth 2 (two) times daily.     celecoxib 200 MG capsule  Commonly known as: CeleBREX     hydroCHLOROthiazide 25 MG tablet  Commonly known as: HYDRODIURIL  Take 1 tablet (25 mg total) by mouth once daily.     losartan 100 MG tablet  Commonly known as: COZAAR  Take 1 tablet (100 mg total) by  mouth once daily.     methocarbamoL 500 MG Tab  Commonly known as: ROBAXIN     ticagrelor 90 mg tablet  Commonly known as: BRILINTA  Take 1 tablet (90 mg total) by mouth 2 (two) times daily.     traMADoL 50 mg tablet  Commonly known as: ULTRAM               Discharge Information:   Diet:  Cardiac    Physical Activity:  As tolerated             Instructions:  1. Take all medications as prescribed  2. Keep all follow-up appointments  3. Return to the hospital or call your primary care physicians if any worsening symptoms such as fever, chest pain, shortness of breath, return of symptoms, or any other concerns.    Follow-Up Appointments:  None    Tremayne El DO  [facility] PGY-2  [facility] IM Team B

## 2024-08-25 NOTE — PROGRESS NOTES
[facility] Cardiology 701     SUBJECTIVE:     History of Present Illness:  Patient is a 73 y.o. male presents to followup for CAD  . 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 without any issues   Blood pressures in the 130's   Review of patient's allergies indicates:  No Known Allergies    Past Medical History:   Diagnosis Date    Hypertension     Prostate cancer        Past Surgical History:   Procedure Laterality Date    APPENDECTOMY      CLOSURE DEVICE  9/14/2023    Procedure: Placement of Closure Device;  Surgeon: Moustapha Mitchell III, MD;  Location: [facility] CATH LAB/EP;  Service: Cardiology;;    COLONOSCOPY N/A 5/15/2018    Procedure: COLONOSCOPY;  Surgeon: Edison Amado Jr., MD;  Location: [facility] ENDO;  Service: Endoscopy;  Laterality: N/A;    IVUS, CORONARY  9/14/2023    Procedure: IVUS, Coronary;  Surgeon: Moustapha Mitchell III, MD;  Location: [facility] CATH LAB/EP;  Service: Cardiology;;    LEFT HEART CATHETERIZATION N/A 9/14/2023    Procedure: Left heart cath;  Surgeon: Moustapha Mitchell III, MD;  Location: [facility] CATH LAB/EP;  Service: Cardiology;  Laterality: N/A;    PROSTATE SURGERY      PTCA, SINGLE VESSEL  9/14/2023    Procedure: PTCA, Single Vessel;  Surgeon: Moustapha Mitchell III, MD;  Location: [facility] CATH LAB/EP;  Service: Cardiology;;    STENT, DRUG ELUTING, MULTI VESSEL, CORONARY  9/14/2023    Procedure: Stent, Drug Eluting, Multi Vessel, Coronary;  Surgeon: Moustapha Mitchell III, MD;  Location: [facility] CATH LAB/EP;  Service: Cardiology;;           Past Hospitalization:   8/24:  "chest pains;       Cardiac meds:  ASA 81 mg  Atorvastatin 80 mg  Carvedilol 25 mg BID  HCTZ 25 mg   Losartan 100 mg  Brilinta 90 mg BID        OBJECTIVE:     Vital Signs (Most Recent)  Vitals:    24 1558   BP: (!) 156/88   Pulse: 76   SpO2: 99%   Weight: 97 kg (213 lb 13.5 oz)   Height: 5' 11" (1.803 m)           Physical Exam:  Neck: normal carotids, no bruits; normal JVP  Lungs :clear  Heart: RR, normal S1,S2, no murmurs, no gallops  Abd: no masses; no bruits;   Exts: normal DP and PT pulses bilaterally, normal radials; no edema noted               Labs    : LDL 88; A1c 5.7; GFR 59  : A1c 5.7; GFR 59: LDL 67    Diagnostic Results:    1.EK23: OK;   1b. EK23: OK  1c. EK/15/23: T wave inversion inferiorly   2.Echo: : normal EF; normal valves   3. Stress test: nuclear : negative; normal EF   4. Cath:  for STEMI inferior leads; left main: normal; LAD: distal 95% near apex; CX OK; Right: prox 90%;stent placed: PDA 70% ; distal right 100% stent placed     Chart review:        ASSESSMENT/PLAN:     Hypertension: controlled  DM: borderline: A1c 5.7  Last LDL 88 prior to statin   No symptoms of angina or failure  Distal LAD at apex disease noted ; belching with discomfort once in a while noncardiac     Plan: continue the same medications  2. May stop the brilinta after 24 and get ready for endoscopy if suggested by GI - refer to GI  3.Return 6 months     Leslye Chavez MD      "

## 2024-08-28 ENCOUNTER — OFFICE VISIT (OUTPATIENT)
Dept: CARDIOLOGY | Facility: CLINIC | Age: 74
End: 2024-08-28
Payer: MEDICARE

## 2024-08-28 VITALS
OXYGEN SATURATION: 99 % | HEIGHT: 71 IN | DIASTOLIC BLOOD PRESSURE: 88 MMHG | SYSTOLIC BLOOD PRESSURE: 156 MMHG | HEART RATE: 76 BPM | BODY MASS INDEX: 29.94 KG/M2 | WEIGHT: 213.88 LBS

## 2024-08-28 DIAGNOSIS — K21.9 GASTROESOPHAGEAL REFLUX DISEASE, UNSPECIFIED WHETHER ESOPHAGITIS PRESENT: Primary | ICD-10-CM

## 2024-08-28 PROCEDURE — 4010F ACE/ARB THERAPY RXD/TAKEN: CPT | Mod: CPTII,S$GLB,, | Performed by: INTERNAL MEDICINE

## 2024-08-28 PROCEDURE — 3044F HG A1C LEVEL LT 7.0%: CPT | Mod: CPTII,S$GLB,, | Performed by: INTERNAL MEDICINE

## 2024-08-28 PROCEDURE — 3077F SYST BP >= 140 MM HG: CPT | Mod: CPTII,S$GLB,, | Performed by: INTERNAL MEDICINE

## 2024-08-28 PROCEDURE — 3066F NEPHROPATHY DOC TX: CPT | Mod: CPTII,S$GLB,, | Performed by: INTERNAL MEDICINE

## 2024-08-28 PROCEDURE — 3061F NEG MICROALBUMINURIA REV: CPT | Mod: CPTII,S$GLB,, | Performed by: INTERNAL MEDICINE

## 2024-08-28 PROCEDURE — 99213 OFFICE O/P EST LOW 20 MIN: CPT | Mod: S$GLB,,, | Performed by: INTERNAL MEDICINE

## 2024-08-28 PROCEDURE — 3079F DIAST BP 80-89 MM HG: CPT | Mod: CPTII,S$GLB,, | Performed by: INTERNAL MEDICINE

## 2024-08-28 PROCEDURE — 3288F FALL RISK ASSESSMENT DOCD: CPT | Mod: CPTII,S$GLB,, | Performed by: INTERNAL MEDICINE

## 2024-08-28 PROCEDURE — 1159F MED LIST DOCD IN RCRD: CPT | Mod: CPTII,S$GLB,, | Performed by: INTERNAL MEDICINE

## 2024-08-28 PROCEDURE — 1160F RVW MEDS BY RX/DR IN RCRD: CPT | Mod: CPTII,S$GLB,, | Performed by: INTERNAL MEDICINE

## 2024-08-28 PROCEDURE — 3008F BODY MASS INDEX DOCD: CPT | Mod: CPTII,S$GLB,, | Performed by: INTERNAL MEDICINE

## 2024-08-28 PROCEDURE — 99999 PR PBB SHADOW E&M-EST. PATIENT-LVL III: CPT | Mod: PBBFAC,,, | Performed by: INTERNAL MEDICINE

## 2024-08-28 PROCEDURE — 1126F AMNT PAIN NOTED NONE PRSNT: CPT | Mod: CPTII,S$GLB,, | Performed by: INTERNAL MEDICINE

## 2024-08-28 PROCEDURE — 1101F PT FALLS ASSESS-DOCD LE1/YR: CPT | Mod: CPTII,S$GLB,, | Performed by: INTERNAL MEDICINE

## 2024-08-30 ENCOUNTER — TELEPHONE (OUTPATIENT)
Dept: GASTROENTEROLOGY | Facility: CLINIC | Age: 74
End: 2024-08-30
Payer: MEDICARE

## 2024-10-09 ENCOUNTER — TELEPHONE (OUTPATIENT)
Dept: GASTROENTEROLOGY | Facility: CLINIC | Age: 74
End: 2024-10-09
Payer: MEDICARE

## 2024-10-09 NOTE — TELEPHONE ENCOUNTER
----- Message from Lor sent at 10/9/2024  4:03 PM CDT -----  Type:  Same Day Appointment Request      Name of Caller:pt  When is the first available appointment?n/a  Symptoms: K21.9 (ICD-10-CM) - Gastroesophageal reflux disease, unspecified whether esophagitis present  Best Call Back Number: [phone]  Additional Information:

## 2024-10-09 NOTE — TELEPHONE ENCOUNTER
----- Message from Dodie sent at 10/8/2024 10:32 AM CDT -----  Regarding: Appointment  Hello Team,    Can you assist with scheduling for the below? Please let me know if you can accommodate.      Gastroesophageal reflux disease, unspecified whether esophagitis present [K21.9]    Thank you,  Dodie   Physician Referral Specialist

## 2024-10-09 NOTE — TELEPHONE ENCOUNTER
Appt scheduled on Wednesday, October 16, 2024 at 1015am.  Clinic address given and request to check in on 1st floor MOB.

## 2024-10-16 ENCOUNTER — OFFICE VISIT (OUTPATIENT)
Dept: GASTROENTEROLOGY | Facility: CLINIC | Age: 74
End: 2024-10-16
Payer: MEDICARE

## 2024-10-16 VITALS
WEIGHT: 213.94 LBS | HEIGHT: 70 IN | SYSTOLIC BLOOD PRESSURE: 156 MMHG | HEART RATE: 82 BPM | BODY MASS INDEX: 30.63 KG/M2 | DIASTOLIC BLOOD PRESSURE: 94 MMHG

## 2024-10-16 DIAGNOSIS — K21.9 GASTROESOPHAGEAL REFLUX DISEASE, UNSPECIFIED WHETHER ESOPHAGITIS PRESENT: ICD-10-CM

## 2024-10-16 DIAGNOSIS — R13.10 DYSPHAGIA, UNSPECIFIED TYPE: ICD-10-CM

## 2024-10-16 DIAGNOSIS — D12.5 BENIGN NEOPLASM OF SIGMOID COLON: ICD-10-CM

## 2024-10-16 DIAGNOSIS — Z86.0101 HISTORY OF ADENOMATOUS AND SERRATED COLON POLYPS: Primary | ICD-10-CM

## 2024-10-16 PROCEDURE — 3080F DIAST BP >= 90 MM HG: CPT | Mod: CPTII,S$GLB,, | Performed by: INTERNAL MEDICINE

## 2024-10-16 PROCEDURE — 1101F PT FALLS ASSESS-DOCD LE1/YR: CPT | Mod: CPTII,S$GLB,, | Performed by: INTERNAL MEDICINE

## 2024-10-16 PROCEDURE — 3288F FALL RISK ASSESSMENT DOCD: CPT | Mod: CPTII,S$GLB,, | Performed by: INTERNAL MEDICINE

## 2024-10-16 PROCEDURE — 1159F MED LIST DOCD IN RCRD: CPT | Mod: CPTII,S$GLB,, | Performed by: INTERNAL MEDICINE

## 2024-10-16 PROCEDURE — 99204 OFFICE O/P NEW MOD 45 MIN: CPT | Mod: S$GLB,,, | Performed by: INTERNAL MEDICINE

## 2024-10-16 PROCEDURE — 99999 PR PBB SHADOW E&M-EST. PATIENT-LVL III: CPT | Mod: PBBFAC,,, | Performed by: INTERNAL MEDICINE

## 2024-10-16 PROCEDURE — 3066F NEPHROPATHY DOC TX: CPT | Mod: CPTII,S$GLB,, | Performed by: INTERNAL MEDICINE

## 2024-10-16 PROCEDURE — 3044F HG A1C LEVEL LT 7.0%: CPT | Mod: CPTII,S$GLB,, | Performed by: INTERNAL MEDICINE

## 2024-10-16 PROCEDURE — 3061F NEG MICROALBUMINURIA REV: CPT | Mod: CPTII,S$GLB,, | Performed by: INTERNAL MEDICINE

## 2024-10-16 PROCEDURE — 3077F SYST BP >= 140 MM HG: CPT | Mod: CPTII,S$GLB,, | Performed by: INTERNAL MEDICINE

## 2024-10-16 PROCEDURE — 4010F ACE/ARB THERAPY RXD/TAKEN: CPT | Mod: CPTII,S$GLB,, | Performed by: INTERNAL MEDICINE

## 2024-10-16 PROCEDURE — 1126F AMNT PAIN NOTED NONE PRSNT: CPT | Mod: CPTII,S$GLB,, | Performed by: INTERNAL MEDICINE

## 2024-10-16 PROCEDURE — 3008F BODY MASS INDEX DOCD: CPT | Mod: CPTII,S$GLB,, | Performed by: INTERNAL MEDICINE

## 2024-10-16 RX ORDER — HYDROCHLOROTHIAZIDE 25 MG/1
25 TABLET ORAL DAILY
COMMUNITY

## 2024-10-16 RX ORDER — CARVEDILOL PHOSPHATE 20 MG/1
20 CAPSULE, EXTENDED RELEASE ORAL DAILY
COMMUNITY

## 2024-10-16 RX ORDER — ATORVASTATIN CALCIUM 40 MG/1
40 TABLET, FILM COATED ORAL DAILY
COMMUNITY

## 2024-10-16 RX ORDER — SODIUM, POTASSIUM,MAG SULFATES 17.5-3.13G
1 SOLUTION, RECONSTITUTED, ORAL ORAL DAILY
Qty: 1 KIT | Refills: 0 | Status: SHIPPED | OUTPATIENT
Start: 2024-10-16 | End: 2024-10-18

## 2024-10-16 RX ORDER — LOSARTAN POTASSIUM 100 MG/1
100 TABLET ORAL DAILY
COMMUNITY

## 2024-10-16 NOTE — PATIENT INSTRUCTIONS
SUPREP Instructions     [facility] [address]  Clinic Office [phone]  Endoscopy Lab [phone]     You are scheduled for a Egd/Colonoscopy with Dr. Mejia  on Tuesday, November 19, 2024 at [facility] in [place].    Check in at the Hospital -1st floor, Information desk.   Call [phone] to reschedule.     An adult friend/family member must come with you to drive you home.  You cannot drive, take a taxi, Uber/Lyft or bus to leave the Endoscopy Center alone.  If you do not have someone to drive you home, your test will be cancelled.      Please follow the directions of your doctor if you take any pills that thin your blood. If you take these meds: Aggrenox, Brilinta, Effient, Eliquis, Lovenox, Plavix, Pletal, Pradaxa, Ticilid, Xarelto or Coumadin, let the doctor's office know.     Please hold any GLP-1 medications prior to the procedure: Dulaglutide Trulicity(hold week prior), Exenatide Byetta (hold the morning of procedure), Semaglutide Ozempic (hold week prior), Liraglutide Victoza, Saxenda(hold week prior), Lixisenatide Adlyxin (hold the morning of procedure), Semaglutide Rybelsus (hold the morning of procedure), Tirzepatide Mounjaro (hold week prior)      DON'T: On the morning of the test do not take insulin or pills for diabetes.      DO: On the morning of the test, do take any pills for blood pressure, heart, anti-rejection and or seizures with a small sip of water. Bring any inhalers with you.     To have a good prep, you must follow these instructions - please do not use the directions from the pharmacy.     The doctor will send a prescription for the SUPREP.        The Day Before the test:     You can only drink CLEAR LIQUIDS the whole day before your test.  You can't eat any food for the whole day.     You CAN have:  Water, Coffee or decaf coffee (no milk or cream)  Tea  Soft drinks - regular and sugar free  Jello (green or yellow)  Apple Juice, white  grape juice, white cranberry juice  Gatorade, Power Aid, Crystal Light, Sarbjit Aid  Lemonade and Limeade  Bouillon, clear soup  Snowball, popsicles  YOU CAN'T DRINK ANYTHING RED, PURPLE ORANGE OR BLUE   YOU CAN'T DRINK ALCOHOL  ONLY DRINK WHAT IS ON THE LIST        At 5 pm the night before your test:     Pour the 1st bottle of SUPREP into the cup provided in the box. Add water to the line on the cup and mix well.  Drink the whole cup and then drink 2 more full cups of water over 1 hour.  This can be easier to drink if it is cold. You can mix it 20 minutes ahead of time and place in the refrigerator before you drink it.  You must drink it within 30-45 minutes of mixing it.  Do NOT pour the drink over ice.  You can drink it with a straw.     The Day of the test - We will call you 2 days before your test to tell you what time to get there.     5 hours before you come to the hospital (this may be in the middle of the night)  Pour the 2nd bottle of SUPREP into the cup provided in the box. Add water to the line on the cup and mix well.  Drink the whole cup and then drink 2 more full cups of water over 1 hour.  It might be easier to drink if it is cold. You can mix it 20 minutes ahead of time and place in the refrigerator before you drink it.  You must drink it within 30-45 minutes of mixing it.  Do NOT pour the drink over ice.  You can drink it with a straw.     YOU CAN'T EAT OR DRINK ANYTHING ELSE ONCE YOU FINISH THE PREP     Leave all valuables and jewelry at home. You will be at the hospital for 2-4 hours.     Call the Endoscopy department at [phone] with any questions about your procedure.

## 2024-10-16 NOTE — PROGRESS NOTES
[facility] Gastroenterology    CC: Belching    HPI: Luis Mendez is a 74 y.o. male with a PMH of HTN, CAD (s/p stent 2023), and prostate cancer that is referred to GI clinic for belching. In 2022, patient has had complaints of mild chest discomfort and belching that occurs at night when he wakes up to urinate. He denies having heartburn or reflux following meals and has never taken antiacids or a PPI. His sister passed away at 60 for stomach cancer, but was unable to clarify the details regarding that. After belching and passing gas, he reports alleviation of his symptoms. He denies use of NSAIDs, alcohol, and no longer smokes cigarettes. He had a colonoscopy done in 2018 which revealed a 4mm adenoma proximal to the anus which will need repeat colonoscopy for surveillance.     Past Medical History  Past Medical History:   Diagnosis Date    Hypertension     Prostate cancer    - CAD (s/p stent 2023)   - HLD      Physical Examination  BMI 30.70 /94 Pulse 82  General appearance: alert, cooperative, no distress  Lungs: clear to auscultation bilaterally, no dullness to percussion bilaterally  Heart: regular rate and rhythm without rub; no displacement of the PMI   Abdomen: soft, non-tender; bowel sounds normoactive; no organomegaly    Assessment:   Luis Mendez is a 74-year-old male with a PMH of HTN, CAD, HLD, and prostate cancer who is referred to GI clinic for belching. He has had mild chest discomfort and belching that occur at night when he wakes to urinate. He denies dysphagia and post-prandial reflux. He denies use of NSAIDs and has tried GasX and Miralax to alleviate his symptoms. He will need colonoscopy for surveillance of CRC.     Plan:  - EGD/Colon on 11/19   - Suprep ordered     All Mejia MD   [address] [place] [address], [address]  [place] [address] [phone]

## 2024-11-14 ENCOUNTER — TELEPHONE (OUTPATIENT)
Dept: GASTROENTEROLOGY | Facility: CLINIC | Age: 74
End: 2024-11-14
Payer: MEDICARE

## 2024-11-14 NOTE — TELEPHONE ENCOUNTER
Patient given  arrival time @ 730am on Tuesday, November 19, 2024 to [facility] 1st floor Admit.        Prep instructions reviewed: the day before the procedure, follow a clear liquid diet all day, then start the first 1/2 of prep at 5pm and take 2nd 1/2 of prep @ 230am.  Pt must be completely NPO when prep completed.      Medications: Do not take Insulin or oral diabetic medications the day of the procedure.  Take as prescribed: heart, seizure and blood pressure medication in the morning with a sip of water (less than an ounce).  Take any breathing medications and bring inhalers to hospital with you Leave all valuables and jewelry at home.      Wear comfortable clothes to procedure to change into hospital gown You cannot drive for 24 hours after your procedure because you will receive sedation for your procedure to make you comfortable.  A ride must be provided at discharge.      Pt denies taking glp-1 meds.

## 2024-11-19 ENCOUNTER — ANESTHESIA (OUTPATIENT)
Dept: ENDOSCOPY | Facility: HOSPITAL | Age: 74
End: 2024-11-19
Payer: MEDICARE

## 2024-11-19 ENCOUNTER — ANESTHESIA EVENT (OUTPATIENT)
Dept: ENDOSCOPY | Facility: HOSPITAL | Age: 74
End: 2024-11-19
Payer: MEDICARE

## 2024-11-19 ENCOUNTER — HOSPITAL ENCOUNTER (OUTPATIENT)
Facility: HOSPITAL | Age: 74
Discharge: HOME | End: 2024-11-19
Attending: INTERNAL MEDICINE | Admitting: INTERNAL MEDICINE
Payer: MEDICARE

## 2024-11-19 VITALS
DIASTOLIC BLOOD PRESSURE: 71 MMHG | BODY MASS INDEX: 30.35 KG/M2 | RESPIRATION RATE: 13 BRPM | SYSTOLIC BLOOD PRESSURE: 116 MMHG | WEIGHT: 212 LBS | HEART RATE: 74 BPM | HEIGHT: 70 IN | TEMPERATURE: 99 F | OXYGEN SATURATION: 99 %

## 2024-11-19 DIAGNOSIS — Z12.11 COLON CANCER SCREENING: ICD-10-CM

## 2024-11-19 PROCEDURE — 88305 TISSUE EXAM BY PATHOLOGIST: CPT | Performed by: STUDENT IN AN ORGANIZED HEALTH CARE EDUCATION/TRAINING PROGRAM

## 2024-11-19 PROCEDURE — 43239 EGD BIOPSY SINGLE/MULTIPLE: CPT | Performed by: INTERNAL MEDICINE

## 2024-11-19 PROCEDURE — 27201012 HC FORCEPS, HOT/COLD, DISP: Performed by: INTERNAL MEDICINE

## 2024-11-19 PROCEDURE — 37000009 HC ANESTHESIA EA ADD 15 MINS: Performed by: INTERNAL MEDICINE

## 2024-11-19 PROCEDURE — 63600175 PHARM REV CODE 636 W HCPCS: Performed by: NURSE ANESTHETIST, CERTIFIED REGISTERED

## 2024-11-19 PROCEDURE — 88342 IMHCHEM/IMCYTCHM 1ST ANTB: CPT | Performed by: STUDENT IN AN ORGANIZED HEALTH CARE EDUCATION/TRAINING PROGRAM

## 2024-11-19 PROCEDURE — 37000008 HC ANESTHESIA 1ST 15 MINUTES: Performed by: INTERNAL MEDICINE

## 2024-11-19 PROCEDURE — G0121 COLON CA SCRN NOT HI RSK IND: HCPCS | Performed by: INTERNAL MEDICINE

## 2024-11-19 PROCEDURE — 88342 IMHCHEM/IMCYTCHM 1ST ANTB: CPT | Mod: 26,,, | Performed by: STUDENT IN AN ORGANIZED HEALTH CARE EDUCATION/TRAINING PROGRAM

## 2024-11-19 PROCEDURE — 88305 TISSUE EXAM BY PATHOLOGIST: CPT | Mod: 26,,, | Performed by: STUDENT IN AN ORGANIZED HEALTH CARE EDUCATION/TRAINING PROGRAM

## 2024-11-19 RX ORDER — SODIUM CHLORIDE 9 MG/ML
INJECTION, SOLUTION INTRAVENOUS CONTINUOUS
Status: DISCONTINUED | OUTPATIENT
Start: 2024-11-19 | End: 2024-11-19 | Stop reason: HOSPADM

## 2024-11-19 RX ORDER — PROPOFOL 10 MG/ML
VIAL (ML) INTRAVENOUS
Status: DISCONTINUED | OUTPATIENT
Start: 2024-11-19 | End: 2024-11-19

## 2024-11-19 RX ORDER — SODIUM CHLORIDE 0.9 % (FLUSH) 0.9 %
20 SYRINGE (ML) INJECTION ONCE
Status: DISCONTINUED | OUTPATIENT
Start: 2024-11-19 | End: 2024-11-19 | Stop reason: HOSPADM

## 2024-11-19 RX ORDER — LIDOCAINE HYDROCHLORIDE 20 MG/ML
INJECTION INTRAVENOUS
Status: DISCONTINUED | OUTPATIENT
Start: 2024-11-19 | End: 2024-11-19

## 2024-11-19 RX ORDER — PROPOFOL 10 MG/ML
VIAL (ML) INTRAVENOUS CONTINUOUS PRN
Status: DISCONTINUED | OUTPATIENT
Start: 2024-11-19 | End: 2024-11-19

## 2024-11-19 RX ORDER — PHENYLEPHRINE HYDROCHLORIDE 10 MG/ML
INJECTION INTRAVENOUS
Status: DISCONTINUED | OUTPATIENT
Start: 2024-11-19 | End: 2024-11-19

## 2024-11-19 RX ADMIN — PHENYLEPHRINE HYDROCHLORIDE 100 MCG: 10 INJECTION INTRAVENOUS at 09:11

## 2024-11-19 RX ADMIN — PHENYLEPHRINE HYDROCHLORIDE 200 MCG: 10 INJECTION INTRAVENOUS at 10:11

## 2024-11-19 RX ADMIN — PHENYLEPHRINE HYDROCHLORIDE 100 MCG: 10 INJECTION INTRAVENOUS at 10:11

## 2024-11-19 RX ADMIN — PROPOFOL 80 MG: 10 INJECTION, EMULSION INTRAVENOUS at 09:11

## 2024-11-19 RX ADMIN — PROPOFOL 150 MCG/KG/MIN: 10 INJECTION, EMULSION INTRAVENOUS at 09:11

## 2024-11-19 RX ADMIN — LIDOCAINE HYDROCHLORIDE 100 MG: 20 INJECTION, SOLUTION INTRAVENOUS at 09:11

## 2024-11-19 NOTE — TRANSFER OF CARE
"Anesthesia Transfer of Care Note    Patient: Luis Mendez    Procedure(s) Performed: Procedure(s) (LRB):  EGD (ESOPHAGOGASTRODUODENOSCOPY) (N/A)  COLONOSCOPY, SCREENING, HIGH RISK PATIENT (N/A)    Patient location: GI    Anesthesia Type: general    Transport from OR: Transported from OR on 6-10 L/min O2 by face mask with adequate spontaneous ventilation    Post pain: adequate analgesia    Post assessment: no apparent anesthetic complications    Post vital signs: stable    Level of consciousness: awake, alert and oriented    Nausea/Vomiting: no nausea/vomiting    Complications: none    Transfer of care protocol was followed      Last vitals: Visit Vitals  BP (!) 154/90   Pulse 80   Temp 37 °C (98.6 °F)   Resp 20   Ht 5' 10" (1.778 m)   Wt 96.2 kg (212 lb)   SpO2 100%   BMI 30.42 kg/m²     "

## 2024-11-19 NOTE — H&P
"[facility] Gastroenterology    CC: Belching     HPI: Luis Mendez is a 74 y.o. male with a PMH of HTN, CAD (s/p stent 2023), and prostate cancer that is referred to GI clinic for belching. In 2022, patient has had complaints of mild chest discomfort and belching that occurs at night when he wakes up to urinate. He denies having heartburn or reflux following meals and has never taken antiacids or a PPI. His sister passed away at 60 for stomach cancer, but was unable to clarify the details regarding that. After belching and passing gas, he reports alleviation of his symptoms. He denies use of NSAIDs, alcohol, and no longer smokes cigarettes. He had a colonoscopy done in 2018 which revealed a 4mm adenoma proximal to the anus which will need repeat colonoscopy for surveillance.     Past Medical History  Past Medical History:   Diagnosis Date    Hypertension     Prostate cancer          Physical Examination  BP (!) 154/90   Pulse 80   Temp 98.6 °F (37 °C)   Resp 20   Ht 5' 10" (1.778 m)   Wt 96.2 kg (212 lb)   SpO2 100%   BMI 30.42 kg/m²   General appearance: alert, cooperative, no distress  Lungs: clear to auscultation bilaterally, no dullness to percussion bilaterally  Heart: regular rate and rhythm without rub; no displacement of the PMI   Abdomen: soft, non-tender; bowel sounds normoactive; no organomegaly      Assessment:   Luis Mendez is a 74-year-old male with a PMH of HTN, CAD, HLD, and prostate cancer who is referred to GI clinic for belching. He has had mild chest discomfort and belching that occur at night when he wakes to urinate. He denies dysphagia and post-prandial reflux. He denies use of NSAIDs and has tried GasX and Miralax to alleviate his symptoms. He will need colonoscopy for surveillance of CRC.      Plan:  - EGD/Colon today  - Prep was Suprep    All Mejia MD   [address] [place] [address], [address]  [place] [address] [phone]    "

## 2024-11-19 NOTE — ANESTHESIA POSTPROCEDURE EVALUATION
Anesthesia Post Evaluation    Patient: Luis Mendez    Procedure(s) Performed: Procedure(s) (LRB):  EGD (ESOPHAGOGASTRODUODENOSCOPY) (N/A)  COLONOSCOPY, SCREENING, HIGH RISK PATIENT (N/A)    Final Anesthesia Type: general      Patient location during evaluation: PACU  Patient participation: Yes- Able to Participate  Level of consciousness: responds to stimulation  Post-procedure vital signs: reviewed and stable  Pain management: adequate  Airway patency: patent  GISELA mitigation strategies: Multimodal analgesia  PONV status at discharge: No PONV  Anesthetic complications: no      Cardiovascular status: hemodynamically stable  Respiratory status: spontaneous ventilation and room air  Hydration status: euvolemic  Follow-up not needed.              Vitals Value Taken Time   /71 11/19/24 1049   Temp  11/19/24 1205   Pulse 74 11/19/24 1049   Resp 13 11/19/24 1049   SpO2 99 % 11/19/24 1049         Event Time   Out of Recovery 11:38:45         Pain/Leticia Score: Leticia Score: 10 (11/19/2024 10:49 AM)

## 2024-11-19 NOTE — PROVATION PATIENT INSTRUCTIONS
Discharge Summary/Instructions after an Endoscopic Procedure  Patient Name: Luis Mendez  Patient MRN: [id number]  Patient YOB: 1950 Tuesday, November 19, 2024  All Mejia MD  Dear patient,  As a result of recent federal legislation (The Federal Cures Act), you may   receive lab or pathology results from your procedure in your [facility]   account before your physician is able to contact you. Your physician or   their representative will relay the results to you with their   recommendations at their soonest availability.  Thank you,  Your health is very important to us during the Covid Crisis. Following your   procedure today, you will receive a daily text for 2 weeks asking about   signs or symptoms of Covid 19.  Please respond to this text when you   receive it so we can follow up and keep you as safe as possible.   RESTRICTIONS:  During your procedure today, you received medications for sedation.  These   medications may affect your judgment, balance and coordination.  Therefore,   for 24 hours, you have the following restrictions:   - DO NOT drive a car, operate machinery, make legal/financial decisions,   sign important papers or drink alcohol.    ACTIVITY:  Today: no heavy lifting, straining or running due to procedural   sedation/anesthesia.  The following day: return to full activity including work.  DIET:  Eat and drink normally unless instructed otherwise.     TREATMENT FOR COMMON SIDE EFFECTS:  - Mild abdominal pain, nausea, belching, bloating or excessive gas:  rest,   eat lightly and use a heating pad.  - Sore Throat: treat with throat lozenges and/or gargle with warm salt   water.  - Because air was used during the procedure, expelling large amounts of air   from your rectum or belching is normal.  - If a bowel prep was taken, you may not have a bowel movement for 1-3 days.    This is normal.  SYMPTOMS TO WATCH FOR AND REPORT TO YOUR PHYSICIAN:  1. Abdominal pain or bloating, other  than gas cramps.  2. Chest pain.  3. Back pain.  4. Signs of infection such as: chills or fever occurring within 24 hours   after the procedure.  5. Rectal bleeding, which would show as bright red, maroon, or black stools.   (A tablespoon of blood from the rectum is not serious, especially if   hemorrhoids are present.)  6. Vomiting.  7. Weakness or dizziness.  GO DIRECTLY TO THE NEAREST EMERGENCY ROOM IF YOU HAVE ANY OF THE FOLLOWING:      Difficulty breathing              Chills and/or fever over 101 F   Persistent vomiting and/or vomiting blood   Severe abdominal pain   Severe chest pain   Black, tarry stools   Bleeding- more than one tablespoon   Any other symptom or condition that you feel may need urgent attention  Your doctor recommends these additional instructions:  If any biopsies were taken, your doctors clinic will contact you in 1 to 2   weeks with any results.  - Trial of reglan 10mg three times per day x 14 days   - Review pathology results and symptoms in 14 days   - If symptoms improve significantly with reglan, I will schedule an   outpatient GES study (off reglan). If they do not improve with reglan, I   will prescribe a trial of baclofen to inhibit TLSR events   - Discharge patient to home.  For questions, problems or results please call your physician - All Mejia MD.  EMERGENCY PHONE NUMBER: [phone],  LAB RESULTS: [phone]  IF A COMPLICATION OR EMERGENCY SITUATION ARISES AND YOU ARE UNABLE TO REACH   YOUR PHYSICIAN - GO DIRECTLY TO THE EMERGENCY ROOM.  MD All Kate MD  11/19/2024 11:57:33 AM  This report has been verified and signed electronically.  Dear patient,  As a result of recent federal legislation (The Federal Cures Act), you may   receive lab or pathology results from your procedure in your [facility]   account before your physician is able to contact you. Your physician or   their representative will relay the results to you with their    recommendations at their soonest availability.  Thank you,  PROVATION

## 2024-11-19 NOTE — ANESTHESIA PREPROCEDURE EVALUATION
[facility]  Anesthesia Pre-Operative Evaluation         Patient Name: Luis Mendez  YOB: 1950  MRN: [id number]    SUBJECTIVE:     Pre-operative evaluation for Procedure(s) (LRB):  EGD (ESOPHAGOGASTRODUODENOSCOPY) (N/A)  COLONOSCOPY, SCREENING, HIGH RISK PATIENT (N/A)     11/19/2024    Luis Mendez is a 74 y.o. male w/ a significant PMHx as below who presents for the above procedure.    Stents (VENU x 2 to RCA 2/2 inferior STEMI 09/2023)     TTE 8/9/24    Left Ventricle: The left ventricle is normal in size. Normal wall thickness. There is normal systolic function. Biplane (2D) method of discs ejection fraction is 67%. There is normal diastolic function.    Right Ventricle: Normal right ventricular cavity size. Systolic function is normal. TAPSE is 2.26 cm.    Mitral Valve: There is mild regurgitation.    Tricuspid Valve: There is mild regurgitation.    Pulmonary Artery: The estimated pulmonary artery systolic pressure is 20 mmHg.    IVC/SVC: Normal venous pressure at 3 mmHg.    LDA: None documented.    Prev airway: None documented.     Drips: None documented.    Patient Active Problem List   Diagnosis    Screening for colorectal cancer    Essential hypertension    Mixed hyperlipidemia    Family history of diabetes mellitus (DM)    Shortness of breath    Prostate cancer    Class 1 obesity due to excess calories with serious comorbidity and body mass index (BMI) of 32.0 to 32.9 in adult    GERD (gastroesophageal reflux disease)    Hypokalemia    Coronary artery disease involving native coronary artery of native heart without angina pectoris       Review of patient's allergies indicates:  No Known Allergies    Current Inpatient Medications:      No current facility-administered medications on file prior to encounter.     Current Outpatient Medications on File Prior to Encounter   Medication Sig Dispense Refill    amLODIPine (NORVASC) 10 MG tablet Take 10  mg by mouth once daily.      aspirin (ECOTRIN) 81 MG EC tablet Take 1 tablet (81 mg total) by mouth once daily. 30 tablet 12    atorvastatin (LIPITOR) 40 MG tablet Take 40 mg by mouth once daily.      carvediloL (COREG) 25 MG tablet Take 1 tablet (25 mg total) by mouth 2 (two) times daily. 180 tablet 3    CARVEDILOL PHOSPHATE 20 MG ORAL CM24 (COREG CR) 20 mg 24 hr capsule Take 20 mg by mouth once daily.      celecoxib (CELEBREX) 200 MG capsule Take 200 mg by mouth 2 (two) times daily. (Patient not taking: Reported on 10/16/2024)      hydroCHLOROthiazide (HYDRODIURIL) 25 MG tablet Take 25 mg by mouth once daily.      losartan (COZAAR) 100 MG tablet Take 100 mg by mouth once daily.      methocarbamoL (ROBAXIN) 500 MG Tab Take 500 mg by mouth 3 (three) times daily.      multivit-min/iron/folic acid/K (ADULTS MULTIVITAMIN ORAL) Take 1 tablet by mouth once daily.      ticagrelor (BRILINTA) 90 mg tablet Take 1 tablet (90 mg total) by mouth 2 (two) times daily. 60 tablet 11    traMADoL (ULTRAM) 50 mg tablet Take 50 mg by mouth every 12 (twelve) hours as needed. (Patient not taking: Reported on 10/16/2024)         Past Surgical History:   Procedure Laterality Date    APPENDECTOMY      CLOSURE DEVICE  9/14/2023    Procedure: Placement of Closure Device;  Surgeon: Moustapha Mitchell III, MD;  Location: [facility] CATH LAB/EP;  Service: Cardiology;;    COLONOSCOPY N/A 5/15/2018    Procedure: COLONOSCOPY;  Surgeon: Edison Amado Jr., MD;  Location: [facility] ENDO;  Service: Endoscopy;  Laterality: N/A;    IVUS, CORONARY  9/14/2023    Procedure: IVUS, Coronary;  Surgeon: Moustapha Mitchell III, MD;  Location: [facility] CATH LAB/EP;  Service: Cardiology;;    LEFT HEART CATHETERIZATION N/A 9/14/2023    Procedure: Left heart cath;  Surgeon: Moustapha Mitchell III, MD;  Location: [facility] CATH LAB/EP;  Service: Cardiology;  Laterality: N/A;    PROSTATE SURGERY      PTCA, SINGLE VESSEL  9/14/2023    Procedure: PTCA, Single Vessel;  Surgeon:  "Moustapha Mitchell III, MD;  Location: [facility] CATH LAB/EP;  Service: Cardiology;;    STENT, DRUG ELUTING, MULTI VESSEL, CORONARY  9/14/2023    Procedure: Stent, Drug Eluting, Multi Vessel, Coronary;  Surgeon: Moustapha Mitchell III, MD;  Location: [facility] CATH LAB/EP;  Service: Cardiology;;       Social History     Socioeconomic History    Marital status:    Tobacco Use    Smoking status: Former    Smokeless tobacco: Never   Substance and Sexual Activity    Alcohol use: No    Drug use: No    Sexual activity: Yes     Partners: Female     Social Drivers of Health     Financial Resource Strain: Low Risk  (9/14/2023)    Overall Financial Resource Strain (CARDIA)     Difficulty of Paying Living Expenses: Not hard at all   Food Insecurity: No Food Insecurity (9/14/2023)    Hunger Vital Sign     Worried About Running Out of Food in the Last Year: Never true     Ran Out of Food in the Last Year: Never true   Transportation Needs: No Transportation Needs (9/14/2023)    PRAPARE - Transportation     Lack of Transportation (Medical): No     Lack of Transportation (Non-Medical): No   Physical Activity: Insufficiently Active (9/14/2023)    Exercise Vital Sign     Days of Exercise per Week: 2 days     Minutes of Exercise per Session: 30 min   Housing Stability: Low Risk  (9/14/2023)    Housing Stability Vital Sign     Unable to Pay for Housing in the Last Year: No     Number of Places Lived in the Last Year: 1     Unstable Housing in the Last Year: No       OBJECTIVE:     Vital Signs Range (Last 24H):         CBC:   No results for input(s): "WBC", "RBC", "HGB", "HCT", "PLT", "MCV", "MCH", "MCHC" in the last 72 hours.    CMP: No results for input(s): "NA", "K", "CL", "CO2", "BUN", "CREATININE", "GLU", "MG", "PHOS", "CALCIUM", "ALBUMIN", "PROT", "ALKPHOS", "ALT", "AST", "BILITOT" in the last 72 hours.    INR:  No results for input(s): "PT", "INR", "PROTIME", "APTT" in the last 72 hours.    Diagnostic Studies: No relevant " studies.    EKG: No recent studies available.    2D ECHO:  Results for orders placed or performed during the hospital encounter of 08/10/18   Echo doppler color flow    Collection Time: 08/10/18 10:15 AM   Result Value Ref Range    EF + QEF 55 55 - 65    Diastolic Dysfunction No     Mitral Valve Mobility NORMAL     Tricuspid Valve Regurgitation TRIVIAL          ASSESSMENT/PLAN:           Pre-op Assessment    I have reviewed the Patient Summary Reports.    I have reviewed the NPO Status.   I have reviewed the Medications.     Review of Systems  Anesthesia Hx:  No problems with previous Anesthesia                Cardiovascular:     Hypertension   CAD           hyperlipidemia                               Hepatic/GI:     GERD                Endocrine:        Obesity / BMI > 30      Physical Exam  General: Alert and Cooperative    Airway:  Mallampati: III / II  Mouth Opening: Normal  TM Distance: Normal  Tongue: Normal    Dental:  Any loose or missing teeth verified with patient  Chest/Lungs:  Normal Respiratory Rate    Heart:  Rate: Normal        Anesthesia Plan  Type of Anesthesia, risks & benefits discussed:    Anesthesia Type: Gen Natural Airway  Intra-op Monitoring Plan: Standard ASA Monitors  Post Op Pain Control Plan: multimodal analgesia  Induction:  IV  Airway Plan: Direct, Post-Induction  Informed Consent: Informed consent signed with the Patient and all parties understand the risks and agree with anesthesia plan.  All questions answered.   ASA Score: 3  Day of Surgery Review of History & Physical: H&P Update referred to the surgeon/provider.    Ready For Surgery From Anesthesia Perspective.     .

## 2024-11-19 NOTE — [employer] PATIENT INSTRUCTIONS
Discharge Summary/Instructions after an Endoscopic Procedure  Patient Name: Luis Mendez  Patient MRN: [id number]  Patient YOB: 1950 Tuesday, November 19, 2024  All Mejia MD  Dear patient,  As a result of recent federal legislation (The Federal Cures Act), you may   receive lab or pathology results from your procedure in your [facility]   account before your physician is able to contact you. Your physician or   their representative will relay the results to you with their   recommendations at their soonest availability.  Thank you,  Your health is very important to us during the Covid Crisis. Following your   procedure today, you will receive a daily text for 2 weeks asking about   signs or symptoms of Covid 19.  Please respond to this text when you   receive it so we can follow up and keep you as safe as possible.   RESTRICTIONS:  During your procedure today, you received medications for sedation.  These   medications may affect your judgment, balance and coordination.  Therefore,   for 24 hours, you have the following restrictions:   - DO NOT drive a car, operate machinery, make legal/financial decisions,   sign important papers or drink alcohol.    ACTIVITY:  Today: no heavy lifting, straining or running due to procedural   sedation/anesthesia.  The following day: return to full activity including work.  DIET:  Eat and drink normally unless instructed otherwise.     TREATMENT FOR COMMON SIDE EFFECTS:  - Mild abdominal pain, nausea, belching, bloating or excessive gas:  rest,   eat lightly and use a heating pad.  - Sore Throat: treat with throat lozenges and/or gargle with warm salt   water.  - Because air was used during the procedure, expelling large amounts of air   from your rectum or belching is normal.  - If a bowel prep was taken, you may not have a bowel movement for 1-3 days.    This is normal.  SYMPTOMS TO WATCH FOR AND REPORT TO YOUR PHYSICIAN:  1. Abdominal pain or bloating, other  than gas cramps.  2. Chest pain.  3. Back pain.  4. Signs of infection such as: chills or fever occurring within 24 hours   after the procedure.  5. Rectal bleeding, which would show as bright red, maroon, or black stools.   (A tablespoon of blood from the rectum is not serious, especially if   hemorrhoids are present.)  6. Vomiting.  7. Weakness or dizziness.  GO DIRECTLY TO THE NEAREST EMERGENCY ROOM IF YOU HAVE ANY OF THE FOLLOWING:      Difficulty breathing              Chills and/or fever over 101 F   Persistent vomiting and/or vomiting blood   Severe abdominal pain   Severe chest pain   Black, tarry stools   Bleeding- more than one tablespoon   Any other symptom or condition that you feel may need urgent attention  Your doctor recommends these additional instructions:  If any biopsies were taken, your doctors clinic will contact you in 1 to 2   weeks with any results.  - Discharge to home  - Resume previous diet and medications  - Condition stable   - The signs and symptoms of potential delayed complications were discussed   with the patient. If signs or symptoms of these complications develop, call   the [facility] at [phone].   - Return to normal activities tomorrow.  Written discharge instructions were   provided to the patient.   - Repeat colonoscopy in 10 years for surveillance.   For questions, problems or results please call your physician - All Mejia MD.  EMERGENCY PHONE NUMBER: [phone],  LAB RESULTS: [phone]  IF A COMPLICATION OR EMERGENCY SITUATION ARISES AND YOU ARE UNABLE TO REACH   YOUR PHYSICIAN - GO DIRECTLY TO THE EMERGENCY ROOM.  MD All Kate MD  11/19/2024 11:59:39 AM  This report has been verified and signed electronically.  Dear patient,  As a result of recent federal legislation (The Federal Cures Act), you may   receive lab or pathology results from your procedure in your [facility]   account before your physician is able to  contact you. Your physician or   their representative will relay the results to you with their   recommendations at their soonest availability.  Thank you,  [employer]ATION

## 2024-11-21 LAB
FINAL PATHOLOGIC DIAGNOSIS: NORMAL
GROSS: NORMAL
Lab: NORMAL

## 2024-11-26 ENCOUNTER — TELEPHONE (OUTPATIENT)
Dept: GASTROENTEROLOGY | Facility: HOSPITAL | Age: 74
End: 2024-11-26
Payer: MEDICARE

## 2024-11-26 RX ORDER — BISMUTH SUBCITRATE POTASSIUM, METRONIDAZOLE AND TETRACYCLINE HYDROCHLORIDE 140; 125; 125 MG/1; MG/1; MG/1
3 CAPSULE ORAL
Qty: 168 CAPSULE | Refills: 0 | Status: SHIPPED | OUTPATIENT
Start: 2024-11-26 | End: 2024-12-10

## 2024-11-26 RX ORDER — PANTOPRAZOLE SODIUM 40 MG/1
40 TABLET, DELAYED RELEASE ORAL DAILY
Qty: 14 TABLET | Refills: 0 | Status: SHIPPED | OUTPATIENT
Start: 2024-11-26 | End: 2024-12-10

## 2024-11-26 NOTE — PROGRESS NOTES
Pathology reviewed with patient. Gastric biopsies positive for HP. Prescribed quad therapy to preferred pharmacy.

## 2024-11-26 NOTE — TELEPHONE ENCOUNTER
[facility] Plan of Care     Called patient to discuss further treatment options for belching. Currently, symptoms have improved and patient was not interested in seeking further treatment. If symptoms were to worsen will plan for trial of Reglan 10mg TID x 14 days.     Adenike Sandoval MD  [facility] Fellow, PGY IV

## 2024-12-03 ENCOUNTER — TELEPHONE (OUTPATIENT)
Dept: GASTROENTEROLOGY | Facility: CLINIC | Age: 74
End: 2024-12-03
Payer: MEDICARE

## 2024-12-03 NOTE — TELEPHONE ENCOUNTER
"Pt states "I didn't ask for a refill only if medications were sent to pharmacy.  I did not get a call from pharmacy".  Per pt, this is not a refill request.  Pt advised to contact pharmacy to check status of medication sent on 11/26/24.  "

## 2024-12-03 NOTE — TELEPHONE ENCOUNTER
----- Message from Norma sent at 12/3/2024 11:30 AM CST -----  Type:  RX Refill Request    Who Called: pt    Refill or New Rx:refill    RX Name and Strength:bismuth-metronidazole-tetracycline (PYLERA) 140-125-125 mg per capsule    How is the patient currently taking it? (ex. 1XDay): Take 3 capsules by mouth 4 (four) times daily   before meals and nightly. for 14 days - Oral    Is this a 30 day or 90 day RX:168    [facility] with phone number:[phone] [address] [facility]  [address] [facility] [address]  Phone: [phone] Fax: [phone]  Hours: Not open 24 hours      Local or Mail Order:local  Ordering Provider:dr peñaloza   Would the patient rather a call back or a response via [facility]? Call   Best Call Back Number:[phone]  Additional Information:        Type:  RX Refill Request    Who Called: pt  Refill or New Rx:refill  RX Name and Strength:pantoprazole (PROTONIX) 40 MG tablet  How is the patient currently taking it? (ex. 1XDay):Take 1 tablet (40 mg total) by mouth once daily. for 14 days - Oral  Is this a 30 day or 90 day RX:14

## 2024-12-18 ENCOUNTER — TELEPHONE (OUTPATIENT)
Dept: GASTROENTEROLOGY | Facility: CLINIC | Age: 74
End: 2024-12-18
Payer: MEDICARE

## 2024-12-18 NOTE — TELEPHONE ENCOUNTER
Pt states pylera is not covered by his insurance.  Pt requesting alternative medication.  Informed request forward to Dr. Mejia.

## 2024-12-18 NOTE — TELEPHONE ENCOUNTER
----- Message from Juan M sent at 12/18/2024  2:41 PM CST -----  Contact: pt  Type: Requesting to speak with nurse        Who Called: PT  Regarding: personal   Would the patient rather a call back or a response via [facility]? Call back  Best Call Back Number: [phone]   Additional Information:

## 2024-12-28 PROBLEM — K29.70 GASTRITIS: Status: ACTIVE | Noted: 2024-12-28

## 2025-01-13 ENCOUNTER — TELEPHONE (OUTPATIENT)
Dept: GASTROENTEROLOGY | Facility: CLINIC | Age: 75
End: 2025-01-13
Payer: MEDICARE

## 2025-01-13 NOTE — TELEPHONE ENCOUNTER
Pt requesting alternative to meds sent to pharmacy, pylera is not covered by his insurance.  Request forward to Dr. Mejia.

## 2025-01-13 NOTE — TELEPHONE ENCOUNTER
----- Message from Heike sent at 1/13/2025 11:41 AM CST -----  Contact: pt  Type:  Sooner Appointment Request    Caller is requesting a sooner appointment.  Caller declined first available appointment listed below.  Caller will not accept being placed on the waitlist and is requesting a message be sent to doctor.  Name of Caller:pt  When is the first available appointment?books are closed  Symptoms:f/u  Would the patient rather a call back or a response via [facility]? call  Best Call Back Number: [phone]  Additional Information: requesting a call from Ping

## 2025-01-14 ENCOUNTER — TELEPHONE (OUTPATIENT)
Dept: GASTROENTEROLOGY | Facility: CLINIC | Age: 75
End: 2025-01-14
Payer: MEDICARE

## 2025-01-14 DIAGNOSIS — Z86.19 HISTORY OF HELICOBACTER PYLORI INFECTION: Primary | ICD-10-CM

## 2025-01-14 RX ORDER — TETRACYCLINE HYDROCHLORIDE 500 MG/1
500 CAPSULE ORAL 4 TIMES DAILY
Qty: 56 CAPSULE | Refills: 0 | Status: SHIPPED | OUTPATIENT
Start: 2025-01-14 | End: 2025-01-28

## 2025-01-14 RX ORDER — AMOXICILLIN 500 MG/1
1000 TABLET, FILM COATED ORAL 2 TIMES DAILY
Qty: 56 TABLET | Refills: 0 | Status: SHIPPED | OUTPATIENT
Start: 2025-01-14 | End: 2025-01-28

## 2025-01-14 RX ORDER — TETRACYCLINE HYDROCHLORIDE 500 MG/1
500 CAPSULE ORAL 4 TIMES DAILY
Qty: 56 CAPSULE | Refills: 0 | Status: SHIPPED | OUTPATIENT
Start: 2025-01-14 | End: 2025-01-14

## 2025-01-14 RX ORDER — AMOXICILLIN 500 MG/1
1000 TABLET, FILM COATED ORAL 2 TIMES DAILY
Qty: 56 TABLET | Refills: 0 | Status: SHIPPED | OUTPATIENT
Start: 2025-01-14 | End: 2025-01-14

## 2025-01-14 NOTE — TELEPHONE ENCOUNTER
----- Message from David sent at 1/14/2025 11:17 AM CST -----  Type:  Pharmacy Calling to Clarify an RX    Name of Caller:arnoldo   Pharmacy Name:[facility]   Prescription Name:amoxicillin (AMOXIL) 500 MG Tab drug interaction with tetracycline (ACHROMYCIN,SUMYCIN) 500 MG capsule  What do they need to clarify?:drug interaction   Best Call Back Number:  Additional Information:

## 2025-01-15 ENCOUNTER — TELEPHONE (OUTPATIENT)
Dept: GASTROENTEROLOGY | Facility: CLINIC | Age: 75
End: 2025-01-15
Payer: MEDICARE

## 2025-01-15 NOTE — TELEPHONE ENCOUNTER
Pt notified amoxicillin and tetracycline sent to pharmacy on 1/14/25.  Instructions given to take amoxicillin  500mg 2 tablets by mouth 2 times daily for 14days.  Take tetracycline, 1 capsule 4 times daily by mouth for 14 days.  Pt repeated correctly.

## 2025-02-18 ENCOUNTER — LAB VISIT (OUTPATIENT)
Dept: LAB | Facility: HOSPITAL | Age: 75
End: 2025-02-18
Attending: INTERNAL MEDICINE
Payer: MEDICARE

## 2025-02-18 DIAGNOSIS — I10 ESSENTIAL (PRIMARY) HYPERTENSION: Primary | ICD-10-CM

## 2025-02-18 LAB
ALBUMIN SERPL BCP-MCNC: 4.1 G/DL (ref 3.5–5.2)
ALP SERPL-CCNC: 117 U/L (ref 38–126)
ALT SERPL W/O P-5'-P-CCNC: 51 U/L (ref 10–44)
ANION GAP SERPL CALC-SCNC: 7 MMOL/L (ref 8–16)
AST SERPL-CCNC: 39 U/L (ref 15–46)
BASOPHILS # BLD AUTO: 0.02 K/UL (ref 0–0.2)
BASOPHILS NFR BLD: 0.3 % (ref 0–1.9)
BILIRUB SERPL-MCNC: 0.9 MG/DL (ref 0.1–1)
CALCIUM SERPL-MCNC: 9.7 MG/DL (ref 8.7–10.5)
CHLORIDE SERPL-SCNC: 101 MMOL/L (ref 95–110)
CHOLEST SERPL-MCNC: 157 MG/DL (ref 120–199)
CHOLEST/HDLC SERPL: 3.1 {RATIO} (ref 2–5)
CO2 SERPL-SCNC: 29 MMOL/L (ref 23–29)
CREAT SERPL-MCNC: 1.36 MG/DL (ref 0.5–1.4)
DIFFERENTIAL METHOD BLD: NORMAL
EOSINOPHIL # BLD AUTO: 0.3 K/UL (ref 0–0.5)
EOSINOPHIL NFR BLD: 4.6 % (ref 0–8)
ERYTHROCYTE [DISTWIDTH] IN BLOOD BY AUTOMATED COUNT: 12.2 % (ref 11.5–14.5)
EST. GFR  (NO RACE VARIABLE): 55.3 ML/MIN/1.73 M^2
GLUCOSE SERPL-MCNC: 99 MG/DL (ref 70–110)
HCT VFR BLD AUTO: 46.1 % (ref 40–54)
HDLC SERPL-MCNC: 51 MG/DL (ref 40–75)
HDLC SERPL: 32.5 % (ref 20–50)
HGB BLD-MCNC: 15.2 G/DL (ref 14–18)
IMM GRANULOCYTES # BLD AUTO: 0.01 K/UL (ref 0–0.04)
IMM GRANULOCYTES NFR BLD AUTO: 0.2 % (ref 0–0.5)
LDLC SERPL CALC-MCNC: 93 MG/DL (ref 63–159)
LYMPHOCYTES # BLD AUTO: 1.9 K/UL (ref 1–4.8)
LYMPHOCYTES NFR BLD: 29.3 % (ref 18–48)
MCH RBC QN AUTO: 30.3 PG (ref 27–31)
MCHC RBC AUTO-ENTMCNC: 33 G/DL (ref 32–36)
MCV RBC AUTO: 92 FL (ref 82–98)
MONOCYTES # BLD AUTO: 0.6 K/UL (ref 0.3–1)
MONOCYTES NFR BLD: 9.9 % (ref 4–15)
NEUTROPHILS # BLD AUTO: 3.6 K/UL (ref 1.8–7.7)
NEUTROPHILS NFR BLD: 55.7 % (ref 38–73)
NONHDLC SERPL-MCNC: 106 MG/DL
NRBC BLD-RTO: 0 /100 WBC
PLATELET # BLD AUTO: 279 K/UL (ref 150–450)
PMV BLD AUTO: 10.2 FL (ref 9.2–12.9)
POTASSIUM SERPL-SCNC: 3.9 MMOL/L (ref 3.5–5.1)
PROT SERPL-MCNC: 8.2 G/DL (ref 6–8.4)
RBC # BLD AUTO: 5.02 M/UL (ref 4.6–6.2)
SODIUM SERPL-SCNC: 137 MMOL/L (ref 136–145)
TRIGL SERPL-MCNC: 65 MG/DL (ref 30–150)
TSH SERPL DL<=0.005 MIU/L-ACNC: 1.77 UIU/ML (ref 0.4–4)
UUN UR-MCNC: 23 MG/DL (ref 2–20)
WBC # BLD AUTO: 6.48 K/UL (ref 3.9–12.7)

## 2025-02-18 PROCEDURE — 36415 COLL VENOUS BLD VENIPUNCTURE: CPT | Mod: PN | Performed by: INTERNAL MEDICINE

## 2025-02-18 PROCEDURE — 80053 COMPREHEN METABOLIC PANEL: CPT | Mod: PN | Performed by: INTERNAL MEDICINE

## 2025-02-18 PROCEDURE — 85025 COMPLETE CBC W/AUTO DIFF WBC: CPT | Mod: PN | Performed by: INTERNAL MEDICINE

## 2025-02-18 PROCEDURE — 84443 ASSAY THYROID STIM HORMONE: CPT | Mod: PN | Performed by: INTERNAL MEDICINE

## 2025-02-18 PROCEDURE — 80061 LIPID PANEL: CPT | Performed by: INTERNAL MEDICINE

## 2025-04-12 ENCOUNTER — HOSPITAL ENCOUNTER (EMERGENCY)
Facility: HOSPITAL | Age: 75
Discharge: HOME | End: 2025-04-12
Attending: EMERGENCY MEDICINE
Payer: MEDICARE

## 2025-04-12 VITALS
HEART RATE: 70 BPM | BODY MASS INDEX: 31.78 KG/M2 | RESPIRATION RATE: 13 BRPM | DIASTOLIC BLOOD PRESSURE: 89 MMHG | OXYGEN SATURATION: 99 % | WEIGHT: 222 LBS | SYSTOLIC BLOOD PRESSURE: 169 MMHG | TEMPERATURE: 98 F | HEIGHT: 70 IN

## 2025-04-12 DIAGNOSIS — R07.9 CHEST PAIN: Primary | ICD-10-CM

## 2025-04-12 LAB
ABSOLUTE EOSINOPHIL (OHS): 0.28 K/UL
ABSOLUTE MONOCYTE (OHS): 0.56 K/UL (ref 0.3–1)
ABSOLUTE NEUTROPHIL COUNT (OHS): 1.69 K/UL (ref 1.8–7.7)
ALBUMIN SERPL BCP-MCNC: 4.4 G/DL (ref 3.5–5.2)
ALP SERPL-CCNC: 135 UNIT/L (ref 38–126)
ALT SERPL W/O P-5'-P-CCNC: 42 UNIT/L (ref 10–44)
ANION GAP (OHS): 10 MMOL/L (ref 8–16)
AST SERPL-CCNC: 33 UNIT/L (ref 15–46)
BASOPHILS # BLD AUTO: 0.01 K/UL
BASOPHILS NFR BLD AUTO: 0.2 %
BILIRUB SERPL-MCNC: 0.8 MG/DL (ref 0.1–1)
BUN SERPL-MCNC: 18 MG/DL (ref 2–20)
CALCIUM SERPL-MCNC: 9.3 MG/DL (ref 8.7–10.5)
CHLORIDE SERPL-SCNC: 103 MMOL/L (ref 95–110)
CO2 SERPL-SCNC: 29 MMOL/L (ref 23–29)
CREAT SERPL-MCNC: 1.3 MG/DL (ref 0.5–1.4)
ERYTHROCYTE [DISTWIDTH] IN BLOOD BY AUTOMATED COUNT: 11.9 % (ref 11.5–14.5)
GFR SERPLBLD CREATININE-BSD FMLA CKD-EPI: 58 ML/MIN/1.73/M2
GLUCOSE SERPL-MCNC: 108 MG/DL (ref 70–110)
HCT VFR BLD AUTO: 45.5 % (ref 40–54)
HGB BLD-MCNC: 15.2 GM/DL (ref 14–18)
IMM GRANULOCYTES # BLD AUTO: 0.02 K/UL (ref 0–0.04)
IMM GRANULOCYTES NFR BLD AUTO: 0.4 % (ref 0–0.5)
LYMPHOCYTES # BLD AUTO: 2.3 K/UL (ref 1–4.8)
MCH RBC QN AUTO: 30.4 PG (ref 27–31)
MCHC RBC AUTO-ENTMCNC: 33.4 G/DL (ref 32–36)
MCV RBC AUTO: 91 FL (ref 82–98)
NT-PROBNP SERPL-MCNC: 24 PG/ML (ref 5–900)
NUCLEATED RBC (/100WBC) (OHS): 0 /100 WBC
PLATELET # BLD AUTO: 257 K/UL (ref 150–450)
PMV BLD AUTO: 10 FL (ref 9.2–12.9)
POTASSIUM SERPL-SCNC: 3.3 MMOL/L (ref 3.5–5.1)
PROT SERPL-MCNC: 8.7 GM/DL (ref 6–8.4)
RBC # BLD AUTO: 5 M/UL (ref 4.6–6.2)
RELATIVE EOSINOPHIL (OHS): 5.8 %
RELATIVE LYMPHOCYTE (OHS): 47.3 % (ref 18–48)
RELATIVE MONOCYTE (OHS): 11.5 % (ref 4–15)
RELATIVE NEUTROPHIL (OHS): 34.8 % (ref 38–73)
SODIUM SERPL-SCNC: 142 MMOL/L (ref 136–145)
TROPONIN I SERPL DL<=0.01 NG/ML-MCNC: <0.012 NG/ML (ref 0–0.03)
TROPONIN I SERPL DL<=0.01 NG/ML-MCNC: <0.012 NG/ML (ref 0–0.03)
WBC # BLD AUTO: 4.86 K/UL (ref 3.9–12.7)

## 2025-04-12 PROCEDURE — 93010 ELECTROCARDIOGRAM REPORT: CPT | Mod: ,,, | Performed by: STUDENT IN AN ORGANIZED HEALTH CARE EDUCATION/TRAINING PROGRAM

## 2025-04-12 PROCEDURE — 25000003 PHARM REV CODE 250: Mod: ER | Performed by: EMERGENCY MEDICINE

## 2025-04-12 PROCEDURE — 83880 ASSAY OF NATRIURETIC PEPTIDE: CPT | Mod: ER | Performed by: EMERGENCY MEDICINE

## 2025-04-12 PROCEDURE — 80053 COMPREHEN METABOLIC PANEL: CPT | Mod: ER | Performed by: EMERGENCY MEDICINE

## 2025-04-12 PROCEDURE — 85025 COMPLETE CBC W/AUTO DIFF WBC: CPT | Mod: ER | Performed by: EMERGENCY MEDICINE

## 2025-04-12 PROCEDURE — 99285 EMERGENCY DEPT VISIT HI MDM: CPT | Mod: 25,ER

## 2025-04-12 PROCEDURE — 94761 N-INVAS EAR/PLS OXIMETRY MLT: CPT | Mod: ER

## 2025-04-12 PROCEDURE — 99900035 HC TECH TIME PER 15 MIN (STAT): Mod: ER

## 2025-04-12 PROCEDURE — 93005 ELECTROCARDIOGRAM TRACING: CPT | Mod: ER

## 2025-04-12 PROCEDURE — 84484 ASSAY OF TROPONIN QUANT: CPT | Mod: ER | Performed by: EMERGENCY MEDICINE

## 2025-04-12 RX ORDER — POTASSIUM CHLORIDE 20 MEQ/1
20 TABLET, EXTENDED RELEASE ORAL
Status: COMPLETED | OUTPATIENT
Start: 2025-04-12 | End: 2025-04-12

## 2025-04-12 RX ORDER — ASPIRIN 325 MG
325 TABLET ORAL
Status: COMPLETED | OUTPATIENT
Start: 2025-04-12 | End: 2025-04-12

## 2025-04-12 RX ADMIN — ASPIRIN 325 MG ORAL TABLET 325 MG: 325 PILL ORAL at 07:04

## 2025-04-12 RX ADMIN — POTASSIUM CHLORIDE 20 MEQ: 1500 TABLET, EXTENDED RELEASE ORAL at 08:04

## 2025-04-12 NOTE — ED PROVIDER NOTES
Emergency Department Encounter  Provider Note    Luis Mendez  [id number]  4/12/2025    Evaluation:    History Acquisition:     Chief Complaint   Patient presents with    Chest Pain     Patient c/o left sided chest pain he describes as heavy and sharp since around 0530 this morning.        History of Present Illness:  Luis Mendez who is a 74 y.o. male who presents to the ED today for chest pain.  Patient woke up today feeling pain on the left side.  Shortly after that it progress to a pressure-like pain.  It has since resolved.  He did not have any shortness of breath, palpitations, nausea or diaphoresis.  Patient is seen by Dr. Hi with Cardiology.  Usually takes his home BP medications around 8:00 a.m..  Recently started on Protonix for GERD.      Prior medical records were reviewed:   Patient was seen 08/28/2024 by Cardiology for follow up/  Had right stent for STEMI 9/23     The patient's list of active medical history, family/social history, medications, and allergies as documented has been reviewed.     Past Medical History:   Diagnosis Date    Hypertension     Prostate cancer      Past Surgical History:   Procedure Laterality Date    APPENDECTOMY      CLOSURE DEVICE  9/14/2023    Procedure: Placement of Closure Device;  Surgeon: Moustapha Mitchell III, MD;  Location: [facility] CATH LAB/EP;  Service: Cardiology;;    COLONOSCOPY N/A 5/15/2018    Procedure: COLONOSCOPY;  Surgeon: Edison Amado Jr., MD;  Location: [facility];  Service: Endoscopy;  Laterality: N/A;    COLONOSCOPY, SCREENING, HIGH RISK PATIENT N/A 11/19/2024    Procedure: COLONOSCOPY, SCREENING, HIGH RISK PATIENT;  Surgeon: All Mejia MD;  Location: [facility];  Service: Endoscopy;  Laterality: N/A;    ESOPHAGOGASTRODUODENOSCOPY N/A 11/19/2024    Procedure: EGD (ESOPHAGOGASTRODUODENOSCOPY);  Surgeon: All Mejia MD;  Location: [facility];  Service: Endoscopy;  Laterality: N/A;    IVUS, CORONARY  9/14/2023    Procedure: IVUS,  Coronary;  Surgeon: Moustapha Mitchell III, MD;  Location: [facility] CATH LAB/EP;  Service: Cardiology;;    LEFT HEART CATHETERIZATION N/A 9/14/2023    Procedure: Left heart cath;  Surgeon: Moustapha Mitchell III, MD;  Location: [facility] CATH LAB/EP;  Service: Cardiology;  Laterality: N/A;    PROSTATE SURGERY      PTCA, SINGLE VESSEL  9/14/2023    Procedure: PTCA, Single Vessel;  Surgeon: Moustapha Mitchell III, MD;  Location: [facility] CATH LAB/EP;  Service: Cardiology;;    STENT, DRUG ELUTING, MULTI VESSEL, CORONARY  9/14/2023    Procedure: Stent, Drug Eluting, Multi Vessel, Coronary;  Surgeon: Moustapha Mitchell III, MD;  Location: [facility] CATH LAB/EP;  Service: Cardiology;;     Family History   Problem Relation Name Age of Onset    No Known Problems Mother      No Known Problems Father      No Known Problems Sister       Social History     Socioeconomic History    Marital status:    Tobacco Use    Smoking status: Former    Smokeless tobacco: Never   Substance and Sexual Activity    Alcohol use: No    Drug use: No    Sexual activity: Yes     Partners: Female     Social Drivers of Health     Financial Resource Strain: Low Risk  (9/14/2023)    Overall Financial Resource Strain (CARDIA)     Difficulty of Paying Living Expenses: Not hard at all   Food Insecurity: No Food Insecurity (9/14/2023)    Hunger Vital Sign     Worried About Running Out of Food in the Last Year: Never true     Ran Out of Food in the Last Year: Never true   Transportation Needs: No Transportation Needs (9/14/2023)    PRAPARE - Transportation     Lack of Transportation (Medical): No     Lack of Transportation (Non-Medical): No   Physical Activity: Insufficiently Active (9/14/2023)    Exercise Vital Sign     Days of Exercise per Week: 2 days     Minutes of Exercise per Session: 30 min   Housing Stability: Low Risk  (9/14/2023)    Housing Stability Vital Sign     Unable to Pay for Housing in the Last Year: No     Number of Places Lived in the Last Year:  1     Unstable Housing in the Last Year: No       Medications:  Discharge Medication List as of 4/12/2025 10:41 AM        CONTINUE these medications which have NOT CHANGED    Details   amLODIPine (NORVASC) 10 MG tablet Take 10 mg by mouth once daily., Historical Med      aspirin (ECOTRIN) 81 MG EC tablet Take 1 tablet (81 mg total) by mouth once daily., Starting Wed 11/7/2018, No Print      atorvastatin (LIPITOR) 40 MG tablet Take 40 mg by mouth once daily., Historical Med      carvediloL (COREG) 25 MG tablet Take 1 tablet (25 mg total) by mouth 2 (two) times daily., Starting Fri 9/15/2023, Until Sat 9/14/2024, Normal      CARVEDILOL PHOSPHATE 20 MG ORAL CM24 (COREG CR) 20 mg 24 hr capsule Take 20 mg by mouth once daily., Historical Med      celecoxib (CELEBREX) 200 MG capsule Take 200 mg by mouth 2 (two) times daily., Starting Tue 5/7/2024, Historical Med      hydroCHLOROthiazide (HYDRODIURIL) 25 MG tablet Take 25 mg by mouth once daily., Historical Med      losartan (COZAAR) 100 MG tablet Take 100 mg by mouth once daily., Historical Med      methocarbamoL (ROBAXIN) 500 MG Tab Take 500 mg by mouth 3 (three) times daily., Starting Tue 5/7/2024, Historical Med      multivit-min/iron/folic acid/K (ADULTS MULTIVITAMIN ORAL) Take 1 tablet by mouth once daily., Historical Med      pantoprazole (PROTONIX) 40 MG tablet Take 1 tablet (40 mg total) by mouth once daily. for 14 days, Starting Tue 11/26/2024, Until Tue 12/10/2024, Normal      ticagrelor (BRILINTA) 90 mg tablet Take 1 tablet (90 mg total) by mouth 2 (two) times daily., Starting Fri 9/15/2023, Until Sat 9/14/2024, Normal      traMADoL (ULTRAM) 50 mg tablet Take 50 mg by mouth every 12 (twelve) hours as needed., Starting Tue 5/7/2024, Historical Med             Allergies:  Review of patient's allergies indicates:  No Known Allergies      Physical Exam:     ED Triage Vitals [04/12/25 0657]   BP (!) 181/91   Pulse 74   Resp 15   Temp 98.2 °F (36.8 °C)   SpO2 98 %      Physical Exam    Physical Exam  Vitals and nursing note reviewed.   Constitutional:       General: No acute distress.     Appearance: Normal appearance. Well-developed.   HEENT:      Head: Normocephalic and atraumatic.      Mouth: Mucous membranes are moist.      Eyes: No scleral icterus. No discharge. Conjunctivae normal.      Neck: No increased JVD  Cardiovascular:      Rate and Rhythm: Normal rate.  Regular rhythm.  Pulmonary:      Effort:  Pulmonary effort is normal. No respiratory distress.  Clear breath sounds.  Abdominal:      General: There is no distension.  Nontender to palpation and soft.  Musculoskeletal:         Gait normal.     Skin:     General: Skin is warm and dry.   Neurological:      Mental Status: Alert and oriented.     Cranial Nerves: No cranial nerve deficit.       Differential Diagnoses:   Based on available information and initial assessment, differential diagnosis includes but is not limited to ACS, aortic dissection, myocarditis, pericarditis, pulmonary embolism, pneumothorax, Boerhaave syndrome, biliary colic, GERD, peptic ulcer disease, pneumonia, pleurisy, musculoskeletal pain, costochondritis, thoracic radiculopathy, anxiety, trauma, fracture    ED Management:     Orders Placed This Encounter    X-Ray Chest AP Portable    CBC auto differential    Comprehensive metabolic panel    Troponin I #1    Troponin I #2    NT-Pro Natriuretic Peptide    CBC with Differential    EKG 12-lead    EKG 12-lead    Saline lock IV    aspirin tablet 325 mg    potassium chloride SA CR tablet 20 mEq          EKG:   Independently interpreted by myself see ED course  Labs:   Independently interpreted the labs ordered by myself see ED course  Labs Reviewed   COMPREHENSIVE METABOLIC PANEL - Abnormal       Result Value    Sodium 142      Potassium 3.3 (*)     Chloride 103      CO2 29      Glucose 108      BUN 18      Creatinine 1.3      Calcium 9.3      Protein Total 8.7 (*)     Albumin 4.4      Bilirubin  Total 0.8       (*)     AST 33      ALT 42      Anion Gap 10      eGFR 58 (*)    CBC WITH DIFFERENTIAL - Abnormal    WBC 4.86      RBC 5.00      HGB 15.2      HCT 45.5      MCV 91      MCH 30.4      MCHC 33.4      RDW 11.9      Platelet Count 257      MPV 10.0      Nucleated RBC 0      Neut % 34.8 (*)     Lymph % 47.3      Mono % 11.5      Eos % 5.8      Basophil % 0.2      Imm Grans % 0.4      Neut # 1.69 (*)     Lymph # 2.30      Mono # 0.56      Eos # 0.28      Baso # 0.01      Imm Grans # 0.02     TROPONIN I - Normal    Troponin-I <0.012     NT-PRO NATRIURETIC PEPTIDE - Normal    NT-proBNP 24     TROPONIN I - Normal    Troponin-I <0.012     CBC W/ AUTO DIFFERENTIAL    Narrative:     The following orders were created for panel order CBC auto differential.  Procedure                               Abnormality         Status                     ---------                               -----------         ------                     CBC with Differential[[id number]]       Abnormal            Final result                 Please view results for these tests on the individual orders.          Imaging:   Independently interpreted the imaging ordered by myself see ED course  Imaging Results              X-Ray Chest AP Portable (Final result)  Result time 04/12/25 07:31:29      Final result by Rehan Schmidt MD (04/12/25 07:31:29)                   Impression:     Normal chest x-ray.    Finalized on: 4/12/2025 7:31 AM By:  Rehan Schmidt MD  [facility]# [id number]      2025-04-12 07:33:30.277     [facility]               Narrative:    EXAM:  XR CHEST AP PORTABLE    CLINICAL INDICATION: Chest pain.    COMPARISON STUDY:  None available.    FINDINGS: Normal size heart.  Lungs are clear.                                           Medications Given:     Medications   aspirin tablet 325 mg (325 mg Oral Given 4/12/25 1591)   potassium chloride SA CR tablet 20 mEq (20 mEq Oral Given 4/12/25 0803)        Medical Decision  Making:    Additional Consideration:        Social determinants of health considered during development of treatment plan include:   Body mass index is 31.85 kg/m². - Cumulative social disadvantage, denoted by higher SDOH burden, was associated with increased odds of obesity, independent of clinical and demographic factors. PMID: 39185189 DOI: 10.1002/juan.18489    Former Smoking/tobacco use -  Smoking was the leading social determinant of health affecting mortality and life expectancy, although income was another strong SDOH predictor, according to researchers from the [facility] at [facility] and the Department of Sociology at [facility]. LORI Netw Open. ;5(4):v326482. doi:10.1001/jamanetworkopen..6547     Current co-morbidities considered which impacted clinical decision making:  has a past medical history of Hypertension and Prostate cancer.         ED Course as of 25 1721   Sat 2025   0712 I independently interpreted all labs, imaging studies or EKGs that follow: [JA]   0712 EK beats per minute, normal sinus rhythm, no ST elevations, normal axis [JA]   0737 CBC unremarkable [JA]   0737 CMP with hypokalemia will replete [JA]   1002 Resting comfortably. Remains pain free. Awaiting delta trop [JA]   1030 Chest x-ray shows no consolidation, no widened mediastinum [JA]   1039 Troponin I: <0.012  Normal [JA]   1039 BP(!): 169/89 [JA]   1039 Discussed delta trop results with pt.  Encouraged followup with Dr Hi, his cardiologist.  [JA]      ED Course User Index  [JA] Joann Tran MD            Medical Decision Making  Problems Addressed:  Chest pain: complicated acute illness or injury with systemic symptoms    Amount and/or Complexity of Data Reviewed  External Data Reviewed: notes.  Labs: ordered. Decision-making details documented in ED Course.  Radiology: ordered and independent interpretation performed. Decision-making details documented in  ED Course.  ECG/medicine tests: ordered and independent interpretation performed. Decision-making details documented in ED Course.    Risk  OTC drugs.  Prescription drug management.    Luis Mendez presents to the emergency department today with complaint of chest pain.  The patient was initially hypertensive, took his home blood pressure medications and this has been improved.  Patient's chest pain resolved prior to coming to the emergency department.  His workup today was reassuring with an initial troponin that was normal along with a delta troponin that was normal.  Given this is given going on for couple of days this is fairly reassuring that he is having a acute MI at this time.  EKG also reassuring with no change from previous.  Chest x-ray without any signs of widened mediastinum.  Patient remained chest pain-free.  He was reassured by his workup today.  He will follow up with Dr. Hi in his cardiologist.    Clinical Impression:       ICD-10-CM ICD-9-CM   1. Chest pain  R07.9 786.50       Discharge Medications:  Discharge Medication List as of 4/12/2025 10:41 AM            Follow-up Information       Follow up With Specialties Details Why Contact Info    Leslye Chavez MD Cardiology, Interventional Cardiology Schedule an appointment as soon as possible for a visit   [address]  [address]  [facility] [id number] [phone]               ED Disposition Condition    Discharge Stable             Joann Tran MD  04/12/25 7638

## 2025-04-14 LAB
OHS QRS DURATION: 104 MS
OHS QTC CALCULATION: 423 MS

## 2025-07-02 ENCOUNTER — OFFICE VISIT (OUTPATIENT)
Dept: CARDIOLOGY | Facility: CLINIC | Age: 75
End: 2025-07-02
Payer: MEDICARE

## 2025-07-02 VITALS
HEART RATE: 80 BPM | DIASTOLIC BLOOD PRESSURE: 82 MMHG | HEIGHT: 70 IN | OXYGEN SATURATION: 97 % | SYSTOLIC BLOOD PRESSURE: 164 MMHG | WEIGHT: 223.19 LBS | BODY MASS INDEX: 31.95 KG/M2

## 2025-07-02 DIAGNOSIS — I10 ESSENTIAL HYPERTENSION: ICD-10-CM

## 2025-07-02 DIAGNOSIS — I25.10 CORONARY ARTERY DISEASE INVOLVING NATIVE CORONARY ARTERY OF NATIVE HEART WITHOUT ANGINA PECTORIS: Primary | ICD-10-CM

## 2025-07-02 PROCEDURE — 1160F RVW MEDS BY RX/DR IN RCRD: CPT | Mod: CPTII,S$GLB,, | Performed by: INTERNAL MEDICINE

## 2025-07-02 PROCEDURE — 3077F SYST BP >= 140 MM HG: CPT | Mod: CPTII,S$GLB,, | Performed by: INTERNAL MEDICINE

## 2025-07-02 PROCEDURE — 1159F MED LIST DOCD IN RCRD: CPT | Mod: CPTII,S$GLB,, | Performed by: INTERNAL MEDICINE

## 2025-07-02 PROCEDURE — 99999 PR PBB SHADOW E&M-EST. PATIENT-LVL III: CPT | Mod: PBBFAC,,, | Performed by: INTERNAL MEDICINE

## 2025-07-02 PROCEDURE — 1126F AMNT PAIN NOTED NONE PRSNT: CPT | Mod: CPTII,S$GLB,, | Performed by: INTERNAL MEDICINE

## 2025-07-02 PROCEDURE — 3079F DIAST BP 80-89 MM HG: CPT | Mod: CPTII,S$GLB,, | Performed by: INTERNAL MEDICINE

## 2025-07-02 PROCEDURE — 3288F FALL RISK ASSESSMENT DOCD: CPT | Mod: CPTII,S$GLB,, | Performed by: INTERNAL MEDICINE

## 2025-07-02 PROCEDURE — 1101F PT FALLS ASSESS-DOCD LE1/YR: CPT | Mod: CPTII,S$GLB,, | Performed by: INTERNAL MEDICINE

## 2025-07-02 PROCEDURE — 99214 OFFICE O/P EST MOD 30 MIN: CPT | Mod: S$GLB,,, | Performed by: INTERNAL MEDICINE

## 2025-07-02 PROCEDURE — 3008F BODY MASS INDEX DOCD: CPT | Mod: CPTII,S$GLB,, | Performed by: INTERNAL MEDICINE

## 2025-07-02 PROCEDURE — 4010F ACE/ARB THERAPY RXD/TAKEN: CPT | Mod: CPTII,S$GLB,, | Performed by: INTERNAL MEDICINE

## 2025-07-02 RX ORDER — AMLODIPINE BESYLATE 5 MG/1
5 TABLET ORAL DAILY
Qty: 90 TABLET | Refills: 3 | Status: SHIPPED | OUTPATIENT
Start: 2025-07-02 | End: 2026-07-02

## 2025-07-02 RX ORDER — EZETIMIBE 10 MG/1
10 TABLET ORAL DAILY
Qty: 90 TABLET | Refills: 3 | Status: SHIPPED | OUTPATIENT
Start: 2025-07-02 | End: 2026-07-02

## 2025-07-02 NOTE — PROGRESS NOTES
[facility] Cardiology [address]     SUBJECTIVE:     History of Present Illness:  Patient is a 74 y.o. male presents to followup for CAD  . 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 without any issues with no chest pains    Blood pressures in the 140's  Review of patient's allergies indicates:  No Known Allergies    Past Medical History:   Diagnosis Date    Hypertension     Prostate cancer        Past Surgical History:   Procedure Laterality Date    APPENDECTOMY      CLOSURE DEVICE  9/14/2023    Procedure: Placement of Closure Device;  Surgeon: Moustapha Mitchell III, MD;  Location: [facility] CATH LAB/EP;  Service: Cardiology;;    COLONOSCOPY N/A 5/15/2018    Procedure: COLONOSCOPY;  Surgeon: Edison Amado Jr., MD;  Location: [facility];  Service: Endoscopy;  Laterality: N/A;    COLONOSCOPY, SCREENING, HIGH RISK PATIENT N/A 11/19/2024    Procedure: COLONOSCOPY, SCREENING, HIGH RISK PATIENT;  Surgeon: All Mejia MD;  Location: [facility];  Service: Endoscopy;  Laterality: N/A;    ESOPHAGOGASTRODUODENOSCOPY N/A 11/19/2024    Procedure: EGD (ESOPHAGOGASTRODUODENOSCOPY);  Surgeon: All Mejia MD;  Location: [facility];  Service: Endoscopy;  Laterality: N/A;    IVUS, CORONARY  9/14/2023    Procedure: IVUS, Coronary;  Surgeon: Moustapha Mitchell III, MD;  Location: [facility] CATH LAB/EP;  Service: Cardiology;;    LEFT HEART CATHETERIZATION N/A 9/14/2023    Procedure: Left heart cath;  Surgeon: Moustapha Mitchell III, MD;  Location: [facility] CATH LAB/EP;  Service: Cardiology;  Laterality: N/A;    PROSTATE  "SURGERY      PTCA, SINGLE VESSEL  2023    Procedure: PTCA, Single Vessel;  Surgeon: Moustapha Mitchell III, MD;  Location: [facility] CATH LAB/EP;  Service: Cardiology;;    STENT, DRUG ELUTING, MULTI VESSEL, CORONARY  2023    Procedure: Stent, Drug Eluting, Multi Vessel, Coronary;  Surgeon: Moustapha Mitchell III, MD;  Location: [facility] CATH LAB/EP;  Service: Cardiology;;           Past Hospitalization:   : chest pains;       Cardiac meds:  ASA 81 mg  Atorvastatin 80 mg  Carvedilol 25 mg BID  HCTZ 25 mg   Losartan 100 mg          OBJECTIVE:     Vital Signs (Most Recent)  Vitals:    25 1547   BP: (!) 164/82   Pulse: 80   SpO2: 97%   Weight: 101.2 kg (223 lb 3.5 oz)   Height: 5' 10" (1.778 m)             Physical Exam:  Neck: normal carotids, no bruits; normal JVP  Lungs :clear  Heart: RR, normal S1,S2, no murmurs, no gallops  Abd: no masses; no bruits;   Exts: normal DP and PT pulses bilaterally, normal radials; no edema noted               Labs    : LDL 88; A1c 5.7; GFR 59  : A1c 5.7; GFR 59: LDL 67  : LDL 93;   : GFR 58; K 3.3   Diagnostic Results:    1.EK23: OK;   1b. EK23: OK  1c. EK/15/23: T wave inversion inferiorly   1d. EK/25: T wave inversion inferiorly better   2.Echo: : normal EF; normal valves   3. Stress test: nuclear : negative; normal EF   4. Cath:  for STEMI inferior leads; left main: normal; LAD: distal 95% near apex; CX OK; Right: prox 90%;stent placed: PDA 70% ; distal right 100% stent placed     Chart review:        ASSESSMENT/PLAN:     Hypertension: needs to be better   DM: borderline: A1c 5.7  Last LDL 93 prior to statin   No symptoms of angina or failure  Distal LAD at apex disease noted ; belching with discomfort once in a while noncardiac     Plan: 1.continue the same medications  2. Start Zetia 10 mg   3. Start amlodipine 5 mg  4. Monitor blood pressures   5.Return 3 months     Leslye Chavez MD        "

## (undated) DEVICE — CATH GUIDE FR4 RUNWAY 6FR

## (undated) DEVICE — GUIDEWIRE RUNTHROUGH EF 300CM

## (undated) DEVICE — CATH ANG PIGTAIL 4FR INFINITY

## (undated) DEVICE — KIT GLIDESHEATH SLEND 6FR 10CM

## (undated) DEVICE — KIT LEFT HEART MANIFOLD CUSTOM

## (undated) DEVICE — CATH NC EMERGE MR 5.00X8MM

## (undated) DEVICE — CATH EAGLE EYE PLATINUM

## (undated) DEVICE — CATH GUIDE LINER  V3 6F

## (undated) DEVICE — KIT INTRODUCER W/GUIDEWIRE

## (undated) DEVICE — VISE RADIFOCUS MULTI TORQUE

## (undated) DEVICE — CATH EMERGE MR 15 X 2.50

## (undated) DEVICE — Device

## (undated) DEVICE — INFLATOR ENCORE 26 BLLN INFL

## (undated) DEVICE — COVER PROBE US 5.5X58L NON LTX

## (undated) DEVICE — PAD DEFIB CADENCE ADULT R2

## (undated) DEVICE — DRAPE ANGIO BRACH 38X44IN

## (undated) DEVICE — GUIDE WIRE BMW 014 X190

## (undated) DEVICE — HEMOSTAT VASC BAND REG 24CM

## (undated) DEVICE — VISIPAQUE 320 200ML +PK

## (undated) DEVICE — VALVE CONTROL COPILOT

## (undated) DEVICE — SPIKE SHORT LG BORE 1-WAY 2IN

## (undated) DEVICE — CATH JACKY RADIAL 5FR 100CM